# Patient Record
Sex: FEMALE | Race: BLACK OR AFRICAN AMERICAN | Employment: FULL TIME | URBAN - METROPOLITAN AREA
[De-identification: names, ages, dates, MRNs, and addresses within clinical notes are randomized per-mention and may not be internally consistent; named-entity substitution may affect disease eponyms.]

---

## 2017-04-26 PROBLEM — N94.6 SEVERE DYSMENORRHEA: Status: ACTIVE | Noted: 2017-04-26

## 2017-04-26 PROBLEM — N83.202 LEFT OVARIAN CYST: Status: ACTIVE | Noted: 2017-04-26

## 2017-04-26 PROBLEM — Z84.2 FAMILY HISTORY OF ENDOMETRIOSIS: Status: ACTIVE | Noted: 2017-04-26

## 2017-05-18 ENCOUNTER — HOSPITAL ENCOUNTER (OUTPATIENT)
Dept: SURGERY | Age: 27
Discharge: HOME | End: 2017-05-18

## 2017-05-18 PROBLEM — D25.1 INTRAMURAL LEIOMYOMA OF UTERUS: Status: ACTIVE | Noted: 2017-05-18

## 2017-05-19 VITALS — WEIGHT: 204 LBS | HEIGHT: 64 IN | BODY MASS INDEX: 34.83 KG/M2

## 2017-05-19 NOTE — PERIOP NOTES
Patient verified name, , and surgery as listed in [facility]. Type 2 surgery, phone assessment complete. Orders received. Labs per surgeon: CBC=coming to outpatient lab. Labs per anesthesia protocol: included in surgeons orders. EKG: not needed per [facility] protocols. Patient answered medical/surgical history questions at their best of ability. All prior to admission medications documented in [facility]. Patient instructed to take the following medications the day of surgery according to anesthesia guidelines with a small sip of water: none. Hold all vitamins 7 days prior to surgery and NSAIDS 5 days prior to surgery. Medications to be held: anaprox, vicoprofen. Patient instructed on the following and verbalizes understanding:  Arrive at [employer], time of arrival to be called the day before by 1700  NPO after midnight including gum, mints, and ice chips  Responsible adult must drive patient to the hospital, stay during surgery, and patient will need supervision 24 hours after anesthesia  Use dial and hibiclens in shower the night before surgery and on the morning of surgery  Leave all valuables(money and jewelry) at home but bring insurance card and ID on DOS  Do not wear make-up, nail polish, lotions, cologne, perfumes, powders, or oil on skin.

## 2017-05-23 ENCOUNTER — ANESTHESIA EVENT (OUTPATIENT)
Dept: SURGERY | Age: 27
End: 2017-05-23
Payer: COMMERCIAL

## 2017-05-23 ENCOUNTER — HOSPITAL ENCOUNTER (OUTPATIENT)
Dept: LAB | Age: 27
Discharge: HOME | End: 2017-05-23
Attending: OBSTETRICS & GYNECOLOGY

## 2017-05-23 LAB
ERYTHROCYTE [DISTWIDTH] IN BLOOD BY AUTOMATED COUNT: 15.7 % (ref 11.9–14.6)
HCT VFR BLD AUTO: 32.3 % (ref 35.8–46.3)
HGB BLD-MCNC: 10.4 G/DL (ref 11.7–15.4)
MCH RBC QN AUTO: 25.5 PG (ref 26.1–32.9)
MCHC RBC AUTO-ENTMCNC: 32.2 G/DL (ref 31.4–35)
MCV RBC AUTO: 79.2 FL (ref 79.6–97.8)
PLATELET # BLD AUTO: 276 K/UL (ref 150–450)
PMV BLD AUTO: 10.4 FL (ref 10.8–14.1)
RBC # BLD AUTO: 4.08 M/UL (ref 4.05–5.25)
WBC # BLD AUTO: 5.3 K/UL (ref 4.3–11.1)

## 2017-05-23 NOTE — PERIOP NOTES
CBC results within anesthesia guidelines. Will fax to surgeon's office for review. Chart to preop.     Recent Results (from the past 12 hour(s))   CBC W/O DIFF    Collection Time: 05/23/17  3:03 PM   Result Value Ref Range    WBC 5.3 4.3 - 11.1 K/uL    RBC 4.08 4.05 - 5.25 M/uL    HGB 10.4 (L) 11.7 - 15.4 g/dL    HCT 32.3 (L) 35.8 - 46.3 %    MCV 79.2 (L) 79.6 - 97.8 FL    MCH 25.5 (L) 26.1 - 32.9 PG    MCHC 32.2 31.4 - 35.0 g/dL    RDW 15.7 (H) 11.9 - 14.6 %    PLATELET 836 230 - 176 K/uL    MPV 10.4 (L) 10.8 - 14.1 FL

## 2017-05-23 NOTE — PERIOP NOTES
Call to Morse Severe in lab to verify that CBC was received at 1500 when drawn and sent- no results listed in EMR at this time. Morse Severe states he will follow-up with CBC and call if there are any problems.

## 2017-05-24 ENCOUNTER — HOSPITAL ENCOUNTER (OUTPATIENT)
Age: 27
Setting detail: OUTPATIENT SURGERY
Discharge: HOME | End: 2017-05-24
Attending: OBSTETRICS & GYNECOLOGY | Admitting: OBSTETRICS & GYNECOLOGY
Payer: COMMERCIAL

## 2017-05-24 ENCOUNTER — ANESTHESIA (OUTPATIENT)
Dept: SURGERY | Age: 27
End: 2017-05-24
Payer: COMMERCIAL

## 2017-05-24 VITALS
HEIGHT: 64 IN | HEART RATE: 77 BPM | BODY MASS INDEX: 34.75 KG/M2 | OXYGEN SATURATION: 99 % | TEMPERATURE: 99.6 F | SYSTOLIC BLOOD PRESSURE: 155 MMHG | DIASTOLIC BLOOD PRESSURE: 98 MMHG | RESPIRATION RATE: 20 BRPM | WEIGHT: 203.56 LBS

## 2017-05-24 PROBLEM — N80.9 ENDOMETRIOSIS: Status: ACTIVE | Noted: 2017-05-24

## 2017-05-24 LAB — HCG UR QL: NEGATIVE

## 2017-05-24 PROCEDURE — 74011000250 HC RX REV CODE- 250

## 2017-05-24 PROCEDURE — 74011000254 HC RX REV CODE- 254: Performed by: OBSTETRICS & GYNECOLOGY

## 2017-05-24 PROCEDURE — 74011250636 HC RX REV CODE- 250/636: Performed by: ANESTHESIOLOGY

## 2017-05-24 PROCEDURE — 88304 TISSUE EXAM BY PATHOLOGIST: CPT | Performed by: OBSTETRICS & GYNECOLOGY

## 2017-05-24 PROCEDURE — 77030031139 HC SUT VCRL2 J&J -A: Performed by: OBSTETRICS & GYNECOLOGY

## 2017-05-24 PROCEDURE — 77030000038 HC TIP SCIS LAPSCP SURI -B: Performed by: OBSTETRICS & GYNECOLOGY

## 2017-05-24 PROCEDURE — 77030011810 HC STPLR ENDOSC J&J -G: Performed by: OBSTETRICS & GYNECOLOGY

## 2017-05-24 PROCEDURE — 76060000035 HC ANESTHESIA 2 TO 2.5 HR: Performed by: OBSTETRICS & GYNECOLOGY

## 2017-05-24 PROCEDURE — 77030019927 HC TBNG IRR CYSTO BAXT -A: Performed by: OBSTETRICS & GYNECOLOGY

## 2017-05-24 PROCEDURE — 77030034849: Performed by: OBSTETRICS & GYNECOLOGY

## 2017-05-24 PROCEDURE — 77030035045 HC TRCR ENDOSC VRSPRT BLDLSS COVD -B: Performed by: OBSTETRICS & GYNECOLOGY

## 2017-05-24 PROCEDURE — 77030035035 HC TRCR ENDOSC VRSPRT V2 COVD -B: Performed by: OBSTETRICS & GYNECOLOGY

## 2017-05-24 PROCEDURE — 76210000016 HC OR PH I REC 1 TO 1.5 HR: Performed by: OBSTETRICS & GYNECOLOGY

## 2017-05-24 PROCEDURE — 77030008756 HC TU IRR SUC STRY -B: Performed by: OBSTETRICS & GYNECOLOGY

## 2017-05-24 PROCEDURE — 81025 URINE PREGNANCY TEST: CPT

## 2017-05-24 PROCEDURE — 74011000250 HC RX REV CODE- 250: Performed by: ANESTHESIOLOGY

## 2017-05-24 PROCEDURE — 77030008703 HC TU ET UNCUF COVD -A: Performed by: ANESTHESIOLOGY

## 2017-05-24 PROCEDURE — 77030011502 HC MANIP UTER ZUM ZINN -B: Performed by: OBSTETRICS & GYNECOLOGY

## 2017-05-24 PROCEDURE — 74011250636 HC RX REV CODE- 250/636

## 2017-05-24 PROCEDURE — 77030032490 HC SLV COMPR SCD KNE COVD -B: Performed by: OBSTETRICS & GYNECOLOGY

## 2017-05-24 PROCEDURE — 77030008477 HC STYL SATN SLP COVD -A: Performed by: ANESTHESIOLOGY

## 2017-05-24 PROCEDURE — 77030018825 HC SOL ADH REDUC BAXT -C: Performed by: OBSTETRICS & GYNECOLOGY

## 2017-05-24 PROCEDURE — 77030035029 HC NDL INSUF VERES DISP COVD -B: Performed by: OBSTETRICS & GYNECOLOGY

## 2017-05-24 PROCEDURE — 77030010507 HC ADH SKN DERMBND J&J -B: Performed by: OBSTETRICS & GYNECOLOGY

## 2017-05-24 PROCEDURE — 77030035051: Performed by: OBSTETRICS & GYNECOLOGY

## 2017-05-24 PROCEDURE — 77030008518 HC TBNG INSUF ENDO STRY -B: Performed by: OBSTETRICS & GYNECOLOGY

## 2017-05-24 PROCEDURE — 77030018836 HC SOL IRR NACL ICUM -A: Performed by: OBSTETRICS & GYNECOLOGY

## 2017-05-24 PROCEDURE — 77030011640 HC PAD GRND REM COVD -A: Performed by: OBSTETRICS & GYNECOLOGY

## 2017-05-24 PROCEDURE — 77030020782 HC GWN BAIR PAWS FLX 3M -B: Performed by: ANESTHESIOLOGY

## 2017-05-24 PROCEDURE — 76010000171 HC OR TIME 2 TO 2.5 HR INTENSV-TIER 1: Performed by: OBSTETRICS & GYNECOLOGY

## 2017-05-24 PROCEDURE — 77030035048 HC TRCR ENDOSC OPTCL COVD -B: Performed by: OBSTETRICS & GYNECOLOGY

## 2017-05-24 PROCEDURE — 88305 TISSUE EXAM BY PATHOLOGIST: CPT | Performed by: OBSTETRICS & GYNECOLOGY

## 2017-05-24 PROCEDURE — 76210000020 HC REC RM PH II FIRST 0.5 HR: Performed by: OBSTETRICS & GYNECOLOGY

## 2017-05-24 RX ORDER — SODIUM CHLORIDE, SODIUM LACTATE, POTASSIUM CHLORIDE, CALCIUM CHLORIDE 600; 310; 30; 20 MG/100ML; MG/100ML; MG/100ML; MG/100ML
100 INJECTION, SOLUTION INTRAVENOUS CONTINUOUS
Status: DISCONTINUED | OUTPATIENT
Start: 2017-05-24 | End: 2017-05-24 | Stop reason: HOSPADM

## 2017-05-24 RX ORDER — NALOXONE HYDROCHLORIDE 0.4 MG/ML
0.1 INJECTION, SOLUTION INTRAMUSCULAR; INTRAVENOUS; SUBCUTANEOUS AS NEEDED
Status: DISCONTINUED | OUTPATIENT
Start: 2017-05-24 | End: 2017-05-24 | Stop reason: HOSPADM

## 2017-05-24 RX ORDER — NEOSTIGMINE METHYLSULFATE 1 MG/ML
INJECTION INTRAVENOUS AS NEEDED
Status: DISCONTINUED | OUTPATIENT
Start: 2017-05-24 | End: 2017-05-24 | Stop reason: HOSPADM

## 2017-05-24 RX ORDER — SODIUM CHLORIDE 0.9 % (FLUSH) 0.9 %
5-10 SYRINGE (ML) INJECTION EVERY 8 HOURS
Status: DISCONTINUED | OUTPATIENT
Start: 2017-05-24 | End: 2017-05-24 | Stop reason: HOSPADM

## 2017-05-24 RX ORDER — FENTANYL CITRATE 50 UG/ML
100 INJECTION, SOLUTION INTRAMUSCULAR; INTRAVENOUS ONCE
Status: DISCONTINUED | OUTPATIENT
Start: 2017-05-24 | End: 2017-05-24 | Stop reason: HOSPADM

## 2017-05-24 RX ORDER — INDOCYANINE GREEN AND WATER 25 MG
KIT INJECTION AS NEEDED
Status: DISCONTINUED | OUTPATIENT
Start: 2017-05-24 | End: 2017-05-24 | Stop reason: HOSPADM

## 2017-05-24 RX ORDER — MIDAZOLAM HYDROCHLORIDE 1 MG/ML
2 INJECTION, SOLUTION INTRAMUSCULAR; INTRAVENOUS
Status: DISCONTINUED | OUTPATIENT
Start: 2017-05-24 | End: 2017-05-24 | Stop reason: HOSPADM

## 2017-05-24 RX ORDER — LIDOCAINE HYDROCHLORIDE 20 MG/ML
INJECTION, SOLUTION EPIDURAL; INFILTRATION; INTRACAUDAL; PERINEURAL AS NEEDED
Status: DISCONTINUED | OUTPATIENT
Start: 2017-05-24 | End: 2017-05-24 | Stop reason: HOSPADM

## 2017-05-24 RX ORDER — HYDROMORPHONE HYDROCHLORIDE 2 MG/ML
0.5 INJECTION, SOLUTION INTRAMUSCULAR; INTRAVENOUS; SUBCUTANEOUS
Status: DISCONTINUED | OUTPATIENT
Start: 2017-05-24 | End: 2017-05-24 | Stop reason: HOSPADM

## 2017-05-24 RX ORDER — PROPOFOL 10 MG/ML
INJECTION, EMULSION INTRAVENOUS AS NEEDED
Status: DISCONTINUED | OUTPATIENT
Start: 2017-05-24 | End: 2017-05-24 | Stop reason: HOSPADM

## 2017-05-24 RX ORDER — OXYCODONE HYDROCHLORIDE 5 MG/1
10 TABLET ORAL
Status: DISCONTINUED | OUTPATIENT
Start: 2017-05-24 | End: 2017-05-24 | Stop reason: HOSPADM

## 2017-05-24 RX ORDER — OXYCODONE HYDROCHLORIDE 5 MG/1
5 TABLET ORAL
Status: DISCONTINUED | OUTPATIENT
Start: 2017-05-24 | End: 2017-05-24 | Stop reason: HOSPADM

## 2017-05-24 RX ORDER — LIDOCAINE HYDROCHLORIDE 10 MG/ML
0.1 INJECTION INFILTRATION; PERINEURAL AS NEEDED
Status: DISCONTINUED | OUTPATIENT
Start: 2017-05-24 | End: 2017-05-24 | Stop reason: HOSPADM

## 2017-05-24 RX ORDER — GLYCOPYRROLATE 0.2 MG/ML
INJECTION INTRAMUSCULAR; INTRAVENOUS AS NEEDED
Status: DISCONTINUED | OUTPATIENT
Start: 2017-05-24 | End: 2017-05-24 | Stop reason: HOSPADM

## 2017-05-24 RX ORDER — MIDAZOLAM HYDROCHLORIDE 1 MG/ML
2 INJECTION, SOLUTION INTRAMUSCULAR; INTRAVENOUS ONCE
Status: COMPLETED | OUTPATIENT
Start: 2017-05-24 | End: 2017-05-24

## 2017-05-24 RX ORDER — ROCURONIUM BROMIDE 10 MG/ML
INJECTION, SOLUTION INTRAVENOUS AS NEEDED
Status: DISCONTINUED | OUTPATIENT
Start: 2017-05-24 | End: 2017-05-24 | Stop reason: HOSPADM

## 2017-05-24 RX ORDER — ONDANSETRON 2 MG/ML
4 INJECTION INTRAMUSCULAR; INTRAVENOUS ONCE
Status: DISCONTINUED | OUTPATIENT
Start: 2017-05-24 | End: 2017-05-24 | Stop reason: HOSPADM

## 2017-05-24 RX ORDER — CEFAZOLIN SODIUM 1 G/3ML
INJECTION, POWDER, FOR SOLUTION INTRAMUSCULAR; INTRAVENOUS AS NEEDED
Status: DISCONTINUED | OUTPATIENT
Start: 2017-05-24 | End: 2017-05-24 | Stop reason: HOSPADM

## 2017-05-24 RX ORDER — DEXAMETHASONE SODIUM PHOSPHATE 4 MG/ML
INJECTION, SOLUTION INTRA-ARTICULAR; INTRALESIONAL; INTRAMUSCULAR; INTRAVENOUS; SOFT TISSUE AS NEEDED
Status: DISCONTINUED | OUTPATIENT
Start: 2017-05-24 | End: 2017-05-24 | Stop reason: HOSPADM

## 2017-05-24 RX ORDER — FENTANYL CITRATE 50 UG/ML
INJECTION, SOLUTION INTRAMUSCULAR; INTRAVENOUS AS NEEDED
Status: DISCONTINUED | OUTPATIENT
Start: 2017-05-24 | End: 2017-05-24 | Stop reason: HOSPADM

## 2017-05-24 RX ORDER — HYDROCODONE BITARTRATE AND IBUPROFEN 7.5; 2 MG/1; MG/1
1 TABLET, FILM COATED ORAL
Qty: 30 TAB | Refills: 0 | Status: SHIPPED | OUTPATIENT
Start: 2017-05-24 | End: 2017-07-11 | Stop reason: ALTCHOICE

## 2017-05-24 RX ORDER — ONDANSETRON 2 MG/ML
INJECTION INTRAMUSCULAR; INTRAVENOUS AS NEEDED
Status: DISCONTINUED | OUTPATIENT
Start: 2017-05-24 | End: 2017-05-24 | Stop reason: HOSPADM

## 2017-05-24 RX ORDER — ALBUTEROL SULFATE 0.83 MG/ML
2.5 SOLUTION RESPIRATORY (INHALATION) AS NEEDED
Status: DISCONTINUED | OUTPATIENT
Start: 2017-05-24 | End: 2017-05-24 | Stop reason: HOSPADM

## 2017-05-24 RX ORDER — DIPHENHYDRAMINE HYDROCHLORIDE 50 MG/ML
12.5 INJECTION, SOLUTION INTRAMUSCULAR; INTRAVENOUS
Status: DISCONTINUED | OUTPATIENT
Start: 2017-05-24 | End: 2017-05-24 | Stop reason: HOSPADM

## 2017-05-24 RX ORDER — DOXYCYCLINE 100 MG/1
100 CAPSULE ORAL 2 TIMES DAILY
Qty: 20 CAP | Refills: 0 | Status: SHIPPED | OUTPATIENT
Start: 2017-05-24 | End: 2017-07-11 | Stop reason: ALTCHOICE

## 2017-05-24 RX ORDER — KETOROLAC TROMETHAMINE 30 MG/ML
INJECTION, SOLUTION INTRAMUSCULAR; INTRAVENOUS AS NEEDED
Status: DISCONTINUED | OUTPATIENT
Start: 2017-05-24 | End: 2017-05-24 | Stop reason: HOSPADM

## 2017-05-24 RX ORDER — SODIUM CHLORIDE 0.9 % (FLUSH) 0.9 %
5-10 SYRINGE (ML) INJECTION AS NEEDED
Status: DISCONTINUED | OUTPATIENT
Start: 2017-05-24 | End: 2017-05-24 | Stop reason: HOSPADM

## 2017-05-24 RX ADMIN — NEOSTIGMINE METHYLSULFATE 3 MG: 1 INJECTION INTRAVENOUS at 12:19

## 2017-05-24 RX ADMIN — FENTANYL CITRATE 50 MCG: 50 INJECTION, SOLUTION INTRAMUSCULAR; INTRAVENOUS at 11:37

## 2017-05-24 RX ADMIN — SODIUM CHLORIDE, SODIUM LACTATE, POTASSIUM CHLORIDE, AND CALCIUM CHLORIDE: 600; 310; 30; 20 INJECTION, SOLUTION INTRAVENOUS at 11:04

## 2017-05-24 RX ADMIN — SODIUM CHLORIDE, SODIUM LACTATE, POTASSIUM CHLORIDE, AND CALCIUM CHLORIDE 100 ML/HR: 600; 310; 30; 20 INJECTION, SOLUTION INTRAVENOUS at 09:33

## 2017-05-24 RX ADMIN — PROPOFOL 150 MG: 10 INJECTION, EMULSION INTRAVENOUS at 10:20

## 2017-05-24 RX ADMIN — ROCURONIUM BROMIDE 50 MG: 10 INJECTION, SOLUTION INTRAVENOUS at 10:20

## 2017-05-24 RX ADMIN — LIDOCAINE HYDROCHLORIDE 0.1 ML: 10 INJECTION, SOLUTION INFILTRATION; PERINEURAL at 09:33

## 2017-05-24 RX ADMIN — KETOROLAC TROMETHAMINE 30 MG: 30 INJECTION, SOLUTION INTRAMUSCULAR; INTRAVENOUS at 12:15

## 2017-05-24 RX ADMIN — ONDANSETRON 4 MG: 2 INJECTION INTRAMUSCULAR; INTRAVENOUS at 12:00

## 2017-05-24 RX ADMIN — FENTANYL CITRATE 100 MCG: 50 INJECTION, SOLUTION INTRAMUSCULAR; INTRAVENOUS at 10:20

## 2017-05-24 RX ADMIN — LIDOCAINE HYDROCHLORIDE 100 MG: 20 INJECTION, SOLUTION EPIDURAL; INFILTRATION; INTRACAUDAL; PERINEURAL at 10:20

## 2017-05-24 RX ADMIN — CEFAZOLIN SODIUM 1 G: 1 INJECTION, POWDER, FOR SOLUTION INTRAMUSCULAR; INTRAVENOUS at 10:38

## 2017-05-24 RX ADMIN — DEXAMETHASONE SODIUM PHOSPHATE 5 MG: 4 INJECTION, SOLUTION INTRA-ARTICULAR; INTRALESIONAL; INTRAMUSCULAR; INTRAVENOUS; SOFT TISSUE at 10:33

## 2017-05-24 RX ADMIN — ROCURONIUM BROMIDE 10 MG: 10 INJECTION, SOLUTION INTRAVENOUS at 11:14

## 2017-05-24 RX ADMIN — HYDROMORPHONE HYDROCHLORIDE 0.5 MG: 2 INJECTION, SOLUTION INTRAMUSCULAR; INTRAVENOUS; SUBCUTANEOUS at 12:42

## 2017-05-24 RX ADMIN — HYDROMORPHONE HYDROCHLORIDE 0.5 MG: 2 INJECTION, SOLUTION INTRAMUSCULAR; INTRAVENOUS; SUBCUTANEOUS at 12:56

## 2017-05-24 RX ADMIN — MIDAZOLAM HYDROCHLORIDE 2 MG: 1 INJECTION, SOLUTION INTRAMUSCULAR; INTRAVENOUS at 09:42

## 2017-05-24 RX ADMIN — GLYCOPYRROLATE 0.4 MG: 0.2 INJECTION INTRAMUSCULAR; INTRAVENOUS at 12:19

## 2017-05-24 RX ADMIN — FENTANYL CITRATE 50 MCG: 50 INJECTION, SOLUTION INTRAMUSCULAR; INTRAVENOUS at 10:43

## 2017-05-24 RX ADMIN — HYDROMORPHONE HYDROCHLORIDE 0.5 MG: 2 INJECTION, SOLUTION INTRAMUSCULAR; INTRAVENOUS; SUBCUTANEOUS at 13:15

## 2017-05-24 NOTE — ANESTHESIA PREPROCEDURE EVALUATION
Anesthetic History               Review of Systems / Medical History  Patient summary reviewed, nursing notes reviewed and pertinent labs reviewed    Pulmonary            Asthma : well controlled       Neuro/Psych              Cardiovascular    Hypertension: poorly controlled              Exercise tolerance: >4 METS     GI/Hepatic/Renal                Endo/Other             Other Findings              Physical Exam    Airway  Mallampati: I  TM Distance: 4 - 6 cm  Neck ROM: normal range of motion   Mouth opening: Normal     Cardiovascular  Regular rate and rhythm,  S1 and S2 normal,  no murmur, click, rub, or gallop             Dental  No notable dental hx       Pulmonary  Breath sounds clear to auscultation               Abdominal         Other Findings            Anesthetic Plan    ASA: 2  Anesthesia type: general          Induction: Intravenous  Anesthetic plan and risks discussed with: Patient

## 2017-05-24 NOTE — BRIEF OP NOTE
BRIEF OPERATIVE NOTE    Date of Procedure: 5/24/2017   Preoperative Diagnosis: Pelvic pain in female [R10.2]  Postoperative Diagnosis: Pelvic pain in female [R10.2]    Procedure(s):  LAPAROSCOPY GYN WITH CO2 LASER OF ENDOMETRIOSIS, LYSIS OF ADHESIONS, OVARIAN CYCSTECTOMY  and CHROMOTUBATION  Surgeon(s) and Role:     * Brunilda Troy MD - Primary         Assistant Staff:       Surgical Staff:  Circ-1: Rojelio Fonseca RN  Scrub Tech-1: Anselmo Golden  Scrub Tech-2: Mendez Cadet  Scrub Tech-3: Gomez Weems  Scrub Private/Assistant: Debora Current  Event Time In   Incision Start 1041   Incision Close 1220     Anesthesia: General   Estimated Blood Loss: 10cc  Specimens:   ID Type Source Tests Collected by Time Destination   1 : Peritoneal Biopsy Preservative   Brunilda Troy MD 5/24/2017 1100 Pathology   2 : Left Ovarian Cyst Wall Preservative   Brunilda Troy MD 5/24/2017 1110 Pathology      Findings: stage 4 endometriosis   Complications: 0  Implants: * No implants in log *

## 2017-05-24 NOTE — IP AVS SNAPSHOT
Michael Moody 
 
 
 [address] 
[phone] Patient: Misa Willis MRN: [id number] MARY LOU:[birth year] You are allergic to the following No active allergies Recent Documentation Height Weight BMI OB Status Smoking Status 1.626 m 92.3 kg 34.94 kg/m2 Having regular periods Never Smoker Emergency Contacts Name Discharge Info Relation Home Work Mobile [address]  Parent [1] [phone] About your hospitalization You were admitted on:  May 24, 2017 You last received care in the:  [facility] PACU You were discharged on:  May 24, 2017 Unit phone number:  [phone] Why you were hospitalized Your primary diagnosis was:  Endometriosis Providers Seen During Your Hospitalizations Provider Role Specialty Primary office phone Norma Ferguson MD Attending Provider Obstetrics & Gynecology [phone] Your Primary Care Physician (PCP) Primary Care Physician Office Phone Office Fax NONE ** None ** ** None ** Follow-up Information Follow up With Details Comments Contact Info None   None ([phone]) Patient stated that they have no PCP Norma Ferguson MD Follow up in 1 week(s)  [address] [facility] [facility] [address] [phone] Your Appointments Wednesday June 07, 2017  2:10 PM EDT Global Post Op with Norma Ferguson MD  
[facility] ([facility]) [address] [place] [id number] [phone] Current Discharge Medication List  
  
START taking these medications Dose & Instructions Dispensing Information Comments Morning Noon Evening Bedtime  
 doxycycline 100 mg capsule Commonly known as:  Chava Buenrostro Your last dose was: Your next dose is:    
   
   
 Dose:  100 mg Take 1 Cap by mouth two (2) times a day. Quantity:  20 Cap Refills:  0 HYDROcodone-ibuprofen 7.5-200 mg per tablet Commonly known as:  Casie Nick Replaces:  HYDROcodone-ibuprofen 5-200 mg per tablet Your last dose was: Your next dose is:    
   
   
 Dose:  1 Tab Take 1 Tab by mouth every four (4) hours as needed for Pain. Max Daily Amount: 6 Tabs. Quantity:  30 Tab Refills:  0 STOP taking these medications HYDROcodone-ibuprofen 5-200 mg per tablet Commonly known as:  Casie Nick Replaced by:  HYDROcodone-ibuprofen 7.5-200 mg per tablet ASK your doctor about these medications Dose & Instructions Dispensing Information Comments Morning Noon Evening Bedtime  
 naproxen sodium 550 mg tablet Commonly known as:  Erinn Díaz Your last dose was: Your next dose is:    
   
   
 Dose:  550 mg Take 1 Tab by mouth two (2) times daily (with meals). Quantity:  60 Tab Refills:  2 Where to Get Your Medications Information on where to get these meds will be given to you by the nurse or doctor. ! Ask your nurse or doctor about these medications  
  doxycycline 100 mg capsule HYDROcodone-ibuprofen 7.5-200 mg per tablet Discharge Instructions INSTRUCTIONS FOLLOWING GYN LAPAROSCOPY 
 
 
ACTIVITY  Limit activity today; increase activity tomorrow, but no vigorous exercise  Shower only; no tub baths  No douches, tampons or intercourse until your doctor releases you (at least 2 weeks)  May return to work or school as directed by your doctor DIET  Clear liquids until no nausea or vomiting  Advance to regular diet as tolerated PAIN 
 Expect a moderate amount of pain.  Take pain medication as directed by your doctor. If no prescription for pain is sent home with you, take the appropriate dose of your commonly used pain medication.  Call you doctor if pain is NOT relieved by medication.  DO NOT take aspirin or blood thinners until directed by your doctor. DRESSING CARE  Change dressing / band aids as directed by your doctor. FOLLOW PHONE CALLS * Calls will be made by nursing staff.  If you have any problems or concerns, call your doctor as needed. CALL YOUR DOCTOR IF 
 Excessive bleeding that does not stop after holding mild pressure over the area for 15 minutes  You soak a pad in an hour  Temperature of 101°F or above  Green or yellow, smelly drainage or discharge  You are unable to urinate by bedtime  Nausea and vomiting that does not stop by bedtime AFTER ANESTHESIA  For the next 24 hours: DO NOT Drive, Drink alcoholic beverages, or Make important decisions.  Be aware of dizziness following anesthesia and while taking pain medication.  Plan to stay tonight within 1 hours drive of the hospital. 
 
 
 
 
Discharge Orders None Introducing [facility]! Dear Pamella Daly: 
Thank you for requesting a NXTM account. Our records indicate that you already have an active NXTM account. You can access your account anytime at [email or web address] Did you know that you can access your hospital and ER discharge instructions at any time in NXTM? You can also review all of your test results from your hospital stay or ER visit. Additional Information If you have questions, please visit the Frequently Asked Questions section of the NXTM website at [email or web address] Remember, NXTM is NOT to be used for urgent needs. For medical emergencies, dial 911. Now available from your iPhone and Android! General Information Please provide this summary of care documentation to your next provider. Patient Signature:  ____________________________________________________________  Date:  ____________________________________________________________  
  
Mike Chavez    
    
 Provider Signature:  ____________________________________________________________ Date:  ____________________________________________________________

## 2017-05-24 NOTE — ANESTHESIA POSTPROCEDURE EVALUATION
Post-Anesthesia Evaluation and Assessment    Patient: Pricilla Parents MRN: [id number]  SSN: [id number]    YOB: 1990  Age: 32 y.o. Sex: female       Cardiovascular Function/Vital Signs  Visit Vitals    BP (!) 145/91    Pulse 77    Temp 37.6 °C (99.6 °F)    Resp 20    Ht 5' 4\" (1.626 m)    Wt 92.3 kg (203 lb 9 oz)    SpO2 100%    BMI 34.94 kg/m2       Patient is status post general anesthesia for Procedure(s):  LAPAROSCOPY GYN WITH CO2 LASER OF ENDOMETRIOSIS, LYSIS OF ADHESIONS, OVARIAN CYCSTECTOMY  and CHROMOTUBATION. Nausea/Vomiting: None    Postoperative hydration reviewed and adequate. Pain:  Pain Scale 1: FLACC (05/24/17 1330)  Pain Intensity 1: 0 (05/24/17 1330)   Managed    Neurological Status:   Neuro (WDL): Within Defined Limits (05/24/17 1318)  Neuro  Neurologic State: Sleeping (05/24/17 1330)  Orientation Level: Oriented to person;Oriented to place (05/24/17 1318)  LUE Motor Response: Purposeful (05/24/17 1318)  LLE Motor Response: Purposeful (05/24/17 1318)  RUE Motor Response: Purposeful (05/24/17 1318)  RLE Motor Response: Purposeful (05/24/17 1318)   At baseline    Mental Status and Level of Consciousness: Arousable    Pulmonary Status:   O2 Device: Nasal cannula (05/24/17 1242)   Adequate oxygenation and airway patent    Complications related to anesthesia: None    Post-anesthesia assessment completed.  No concerns    Signed By: Ovidio Lee MD     May 24, 2017

## 2017-05-24 NOTE — DISCHARGE INSTRUCTIONS
INSTRUCTIONS FOLLOWING GYN LAPAROSCOPY      ACTIVITY   Limit activity today; increase activity tomorrow, but no vigorous exercise   Shower only; no tub baths   No douches, tampons or intercourse until your doctor releases you (at least 2 weeks)   May return to work or school as directed by your doctor    DIET   Clear liquids until no nausea or vomiting   Advance to regular diet as tolerated    PAIN   Expect a moderate amount of pain.  Take pain medication as directed by your doctor. If no prescription for pain is sent home with you, take the appropriate dose of your commonly used pain medication.  Call you doctor if pain is NOT relieved by medication.  DO NOT take aspirin or blood thinners until directed by your doctor. DRESSING CARE    Change dressing / band aids as directed by your doctor. FOLLOW PHONE [address] will be made by nursing staff.  If you have any problems or concerns, call your doctor as needed. CALL YOUR DOCTOR IF   Excessive bleeding that does not stop after holding mild pressure over the area for 15 minutes   You soak a pad in an hour   Temperature of 101°F or above   Green or yellow, smelly drainage or discharge   You are unable to urinate by bedtime   Nausea and vomiting that does not stop by bedtime    AFTER ANESTHESIA   For the next 24 hours: DO NOT Drive, Drink alcoholic beverages, or Make important decisions.  Be aware of dizziness following anesthesia and while taking pain medication.    Plan to stay tonight within 1 hours drive of the hospital.

## 2017-05-30 NOTE — OP NOTES
[address]   OPERATIVE REPORT       Name:  Louise Lima   MR#:  [id number]   :  1990   Account #:  [de-identified]   Date of Adm:  2017       DATE OF SURGERY: 2017 at [facility]. PREOPERATIVE DIAGNOSES   1. Pelvic pain. 2. Severe dysmenorrhea. 3. Family history of endometriosis. POSTOPERATIVE DIAGNOSES   1. Stage IV endometriosis with involvement of the bladder flap   and adhesions to the anterior lower fundus. 2. Adhesions of the colon to the posterior lower uterine segment. 3. Bilateral adhesions of the ovaries to the pelvic side walls. 4. Endometrioma of the left ovary. PROCEDURE   1. CO2 laser laparoscopy. 2. Lysis of severe pelvic adhesions. 3. Ablation of implants of endometriosis. 4. Left ovarian cystectomy. 5. Chromotubation. ADDITIONAL FINDINGS: Both tubes filled bilaterally. The fimbria   appeared normal, but there was slow passage of the dye through   the tubes. SURGEON: Deb Roa MD     ASSISTANT: Enrrique Ely    COMPLICATIONS: None. DETAILS OF PROCEDURE: After placement on the operating room   table in supine position, patient under general endotracheal   anesthesia, repositioned in dorsal lateral position, prepped and   draped in the usual fashion for laparoscopic/vaginal surgery. A   Awan cannula was inserted in the cervix. A subumbilical   incision was made. Veress needle was placed through the   incision. The abdomen was filled 2.5 L of CO2. The 11 trocar   sleeve was then placed through the incision followed by   introduction of the operative laparoscope. The laser was   attached to the scope, as well as the camera. A suprapubic 5 mm trocar was placed. Visualization of the pelvis   revealed a very fixed, unmovable uterus. Blunt, sharp, and laser   dissection were used to attempt to restore a normal anatomy. The   left ovarian endometrioma ruptured spontaneously.  The ovaries   were  from the pelvic side wall and then the cyst wall   was removed. Implants of endometriosis over the bladder flap   were vaporized with restoration of normal anatomy in this   region. Right ovarian adhesions to the pelvic sidewall were   lysed both sharply and bluntly. Dye was then placed through the   Awan cannula. The tubes were filled with a slow dripping to the   ends. The fimbriated ends of the tubes were explored and a   laparoscopic grasper was used to dilate into the tubes. The dye   flowed fairly slowly, probably secondary to proximal spasm. The   fimbria appeared normal.    The pelvis was thoroughly irrigated, clean, and hemostasis was   ensured and a liter of Adept was placed in the pelvis. PLAN: The patient was discharged home with plans to verify   insurance coverage for Depo Lupron and she will have followup by   [facility].         Alicia Garber MD      REL / TB   D:  05/30/2017   10:08   T:  05/30/2017   10:40   Job #:  [id number]

## 2017-07-11 PROBLEM — I10 CHRONIC HYPERTENSION: Status: ACTIVE | Noted: 2017-07-11

## 2017-07-11 PROBLEM — G43.009 MIGRAINE WITHOUT AURA, NOT INTRACTABLE: Status: ACTIVE | Noted: 2017-07-11

## 2017-07-14 PROBLEM — O26.899 RH NEGATIVE STATE IN ANTEPARTUM PERIOD: Status: ACTIVE | Noted: 2017-07-14

## 2017-07-14 PROBLEM — Z67.91 RH NEGATIVE STATE IN ANTEPARTUM PERIOD: Status: ACTIVE | Noted: 2017-07-14

## 2017-07-14 PROBLEM — D64.9 ANEMIA: Status: ACTIVE | Noted: 2017-07-14

## 2017-08-15 PROBLEM — O09.90 SUPERVISION OF HIGH RISK PREGNANCY, ANTEPARTUM: Status: ACTIVE | Noted: 2017-08-15

## 2017-09-01 ENCOUNTER — HOSPITAL ENCOUNTER (EMERGENCY)
Age: 27
Discharge: HOME | End: 2017-09-01
Attending: EMERGENCY MEDICINE
Payer: COMMERCIAL

## 2017-09-01 ENCOUNTER — APPOINTMENT (OUTPATIENT)
Dept: ULTRASOUND IMAGING | Age: 27
End: 2017-09-01
Payer: COMMERCIAL

## 2017-09-01 VITALS
BODY MASS INDEX: 33.97 KG/M2 | HEART RATE: 87 BPM | DIASTOLIC BLOOD PRESSURE: 70 MMHG | WEIGHT: 199 LBS | SYSTOLIC BLOOD PRESSURE: 133 MMHG | OXYGEN SATURATION: 99 % | HEIGHT: 64 IN | TEMPERATURE: 98.6 F | RESPIRATION RATE: 16 BRPM

## 2017-09-01 DIAGNOSIS — Z3A.15 15 WEEKS GESTATION OF PREGNANCY: Primary | ICD-10-CM

## 2017-09-01 DIAGNOSIS — O44.02 PLACENTA PREVIA, SECOND TRIMESTER: ICD-10-CM

## 2017-09-01 LAB
HCG SERPL-ACNC: ABNORMAL MIU/ML (ref 0–6)
HCG UR QL: POSITIVE

## 2017-09-01 PROCEDURE — 84702 CHORIONIC GONADOTROPIN TEST: CPT | Performed by: PHYSICIAN ASSISTANT

## 2017-09-01 PROCEDURE — 96372 THER/PROPH/DIAG INJ SC/IM: CPT | Performed by: PHYSICIAN ASSISTANT

## 2017-09-01 PROCEDURE — 76815 OB US LIMITED FETUS(S): CPT

## 2017-09-01 PROCEDURE — 81025 URINE PREGNANCY TEST: CPT

## 2017-09-01 PROCEDURE — 99284 EMERGENCY DEPT VISIT MOD MDM: CPT | Performed by: PHYSICIAN ASSISTANT

## 2017-09-01 PROCEDURE — 81003 URINALYSIS AUTO W/O SCOPE: CPT | Performed by: PHYSICIAN ASSISTANT

## 2017-09-01 PROCEDURE — 74011250636 HC RX REV CODE- 250/636: Performed by: PHYSICIAN ASSISTANT

## 2017-09-01 RX ADMIN — RHO(D) IMMUNE GLOBULIN (HUMAN) 0.3 MG: 1500 SOLUTION INTRAMUSCULAR at 16:51

## 2017-09-01 NOTE — ED TRIAGE NOTES
Pt to er c/o having bleeding . . sts she is 14 wks pregnant. .. sts she uses 2 panty liners a day for the bleeding

## 2017-09-01 NOTE — DISCHARGE INSTRUCTIONS
Placenta Previa: Care Instructions  Your Care Instructions    The placenta forms during pregnancy. It gives the baby nutrients and oxygen. It also removes waste products. Normally, the placenta attaches to the inner wall of the uterus, away from the opening of the uterus. Sometimes the placenta attaches so low that it blocks part of the opening. This is called placenta previa. Bed rest is usually recommended for mothers with this problem who have vaginal bleeding. Your doctor will watch you closely until your baby can be safely delivered. This can be a scary time. Most of the time a  delivery is done. Follow-up care is a key part of your treatment and safety. Be sure to make and go to all appointments, and call your doctor if you are having problems. It's also a good idea to know your test results and keep a list of the medicines you take. How can you care for yourself at home? · Do not do any heavy activity. Do not run or lift anything that weighs more than 20 pounds. · Have a phone nearby at all times. If you start to bleed, you will need to call your doctor right away. · Tell all doctors and nurses who examine you that you must not have pelvic exams because you have placenta previa. · Ask your doctor if you can have sex. Many doctors recommend that women with placenta previa not have intercourse after 28 weeks of pregnancy. · Do not put anything, such as tampons or douches, into your vagina. Use pads if you are bleeding, and call your doctor. When should you call for help? Call 911 anytime you think you may need emergency care. For example, call if:  · You passed out (lost consciousness). · You have severe vaginal bleeding. Call your doctor now or seek immediate medical care if:  · You have any vaginal bleeding. · You have new belly pain. · You think that you are in labor. · You have a sudden release of fluid from your vagina.   Watch closely for changes in your health, and be sure to contact your doctor if you have any questions or concerns. Where can you learn more? Go to http://matthew-franchesca.info/. Enter 82 [id number] in the search box to learn more about \"Placenta Previa: Care Instructions. \"  Current as of: May 30, 2016  Content Version: 11.3  © 8341-2958 [employer]. Care instructions adapted under license by [employer] (which disclaims liability or warranty for this information). If you have questions about a medical condition or this instruction, always ask your healthcare professional. [address] any warranty or liability for your use of this information.

## 2017-09-01 NOTE — ED NOTES
I have reviewed discharge instructions with the patient. The patient verbalized understanding. Printed instructions and follow-up information were given. Pt left ambulatory in no acute distress.

## 2017-09-01 NOTE — ED PROVIDER NOTES
HPI Comments: Pt + pregnancy, spotting x 2 days, using panty liner only, has sex prior to spotting, rh -, called ob office told to come to er G1     Patient is a 32 y.o. female presenting with pregnancy problem. The history is provided by the patient. Pregnancy Problem    This is a new problem. The current episode started yesterday. The problem occurs constantly. The problem has not changed since onset. The pain is associated with an unknown factor. The pain is located in the suprapubic region. The quality of the pain is cramping. The pain is at a severity of 5/10. The pain is mild. Pertinent negatives include no diarrhea, no nausea, no vomiting, no constipation, no dysuria and no frequency. Nothing worsens the pain. The pain is relieved by nothing. Past Medical History:   Diagnosis Date    Abnormal Papanicolaou smear of cervix     Anemia     Asthma     as a child    Frequent sinus infections     Hypertension     hx of; controlled with diet; no meds    LGSIL Pap smear of vagina 2009    Migraine     Morbid obesity (HCC)     BMI-35    Rh negative state in antepartum period        Past Surgical History:   Procedure Laterality Date    HX COLPOSCOPY  2008    HX GYN  05/24/2017    laparoscopy with CO2 laser of endometriosis, lysis of adhesions, ovarian cystectomy and chromotubastion     HX WISDOM TEETH EXTRACTION           Family History:   Problem Relation Age of Onset    Depression Other     Diabetes Maternal Grandmother     Hypertension Maternal Grandmother     Diabetes Paternal Grandmother     Hypertension Paternal Grandmother     No Known Problems Mother     No Known Problems Father     Breast Cancer Neg Hx     Ovarian Cancer Neg Hx     Colon Cancer Neg Hx        Social History     Social History    Marital status: SINGLE     Spouse name: N/A    Number of children: N/A    Years of education: N/A     Occupational History    Not on file.      Social History Main Topics    Smoking status: Never Smoker    Smokeless tobacco: Never Used    Alcohol use No    Drug use: No    Sexual activity: Yes     Partners: Male     Birth control/ protection: None     Other Topics Concern    Not on file     Social History Narrative         ALLERGIES: Review of patient's allergies indicates no known allergies. Review of Systems   Gastrointestinal: Negative for constipation, diarrhea, nausea and vomiting. Genitourinary: Negative for dysuria and frequency.        Vitals:    09/01/17 1228   BP: 133/70   Pulse: 100   Resp: 18   Temp: 98.6 °F (37 °C)   SpO2: 98%   Weight: 90.3 kg (199 lb)   Height: 5' 4\" (1.626 m)            Physical Exam     MDM  Number of Diagnoses or Management Options  Diagnosis management comments: Us with 15 w single iup  + previa   Pt given rhgam due to O-   Pt to follow up with ob gyn office, no sex until seen        Amount and/or Complexity of Data Reviewed  Clinical lab tests: ordered and reviewed  Tests in the radiology section of CPT®: ordered and reviewed  Review and summarize past medical records: yes  Discuss the patient with other providers: yes    Risk of Complications, Morbidity, and/or Mortality  Presenting problems: moderate  Diagnostic procedures: moderate  Management options: moderate    Patient Progress  Patient progress: improved    ED Course       Procedures

## 2017-10-09 PROBLEM — O28.3 ABNORMAL FETAL ULTRASOUND: Status: ACTIVE | Noted: 2017-10-09

## 2017-10-09 PROBLEM — O44.40 LOW-LYING PLACENTA: Status: ACTIVE | Noted: 2017-10-09

## 2017-10-11 PROBLEM — O35.08X0: Status: ACTIVE | Noted: 2017-10-11

## 2017-10-11 PROBLEM — O35.9XX0 SUSPECTED FETAL ANOMALY, ANTEPARTUM: Status: ACTIVE | Noted: 2017-10-09

## 2017-10-11 PROBLEM — N94.6 SEVERE DYSMENORRHEA: Status: RESOLVED | Noted: 2017-04-26 | Resolved: 2017-10-11

## 2017-10-11 PROBLEM — Z84.2 FAMILY HISTORY OF ENDOMETRIOSIS: Status: RESOLVED | Noted: 2017-04-26 | Resolved: 2017-10-11

## 2017-10-11 PROBLEM — O99.210 OBESITY IN PREGNANCY, ANTEPARTUM: Status: ACTIVE | Noted: 2017-07-11

## 2017-10-11 PROBLEM — D25.1 INTRAMURAL LEIOMYOMA OF UTERUS: Status: RESOLVED | Noted: 2017-05-18 | Resolved: 2017-10-11

## 2017-10-11 PROBLEM — N83.202 LEFT OVARIAN CYST: Status: RESOLVED | Noted: 2017-04-26 | Resolved: 2017-10-11

## 2017-10-11 PROBLEM — G43.009 MIGRAINE WITHOUT AURA, NOT INTRACTABLE: Status: RESOLVED | Noted: 2017-07-11 | Resolved: 2017-10-11

## 2017-10-11 PROBLEM — N80.9 ENDOMETRIOSIS: Status: RESOLVED | Noted: 2017-05-24 | Resolved: 2017-10-11

## 2017-10-11 PROBLEM — O10.012 HYPERTENSION IN PREGNANCY, ESSENTIAL, ANTEPARTUM, SECOND TRIMESTER: Status: ACTIVE | Noted: 2017-07-11

## 2017-10-11 PROBLEM — O99.012 ANEMIA COMPLICATING PREGNANCY IN SECOND TRIMESTER: Status: ACTIVE | Noted: 2017-07-14

## 2017-11-01 PROBLEM — O46.92 VAGINAL BLEEDING IN PREGNANCY, SECOND TRIMESTER: Status: ACTIVE | Noted: 2017-11-01

## 2017-11-06 PROBLEM — O36.5990 ASYMMETRIC IUGR AFFECTING PREGNANCY, ANTEPARTUM: Status: ACTIVE | Noted: 2017-11-06

## 2018-05-28 PROBLEM — O44.40 LOW-LYING PLACENTA: Status: RESOLVED | Noted: 2017-10-09 | Resolved: 2018-05-28

## 2018-05-28 PROBLEM — Z98.891 HISTORY OF CESAREAN DELIVERY: Status: ACTIVE | Noted: 2018-05-28

## 2018-05-28 PROBLEM — O09.299 HISTORY OF NEURAL TUBE DEFECT IN INFANT IN PRIOR PREGNANCY, CURRENTLY PREGNANT: Status: ACTIVE | Noted: 2018-05-28

## 2018-05-28 PROBLEM — O35.08X0: Status: RESOLVED | Noted: 2017-10-11 | Resolved: 2018-05-28

## 2018-05-28 PROBLEM — O35.9XX0 SUSPECTED FETAL ANOMALY, ANTEPARTUM: Status: RESOLVED | Noted: 2017-10-09 | Resolved: 2018-05-28

## 2018-05-28 PROBLEM — O36.5990 ASYMMETRIC IUGR AFFECTING PREGNANCY, ANTEPARTUM: Status: RESOLVED | Noted: 2017-11-06 | Resolved: 2018-05-28

## 2018-05-28 PROBLEM — O09.299 HISTORY OF FETAL ANOMALY IN PRIOR PREGNANCY, CURRENTLY PREGNANT: Status: ACTIVE | Noted: 2018-05-28

## 2018-05-28 PROBLEM — O99.012 ANEMIA COMPLICATING PREGNANCY IN SECOND TRIMESTER: Status: RESOLVED | Noted: 2017-07-14 | Resolved: 2018-05-28

## 2018-05-29 PROBLEM — O09.899 SHORT INTERVAL BETWEEN PREGNANCIES AFFECTING PREGNANCY, ANTEPARTUM: Status: ACTIVE | Noted: 2018-05-29

## 2018-05-29 PROBLEM — N80.9 ENDOMETRIOSIS: Status: ACTIVE | Noted: 2017-05-24

## 2018-06-06 ENCOUNTER — HOSPITAL ENCOUNTER (OUTPATIENT)
Dept: NON INVASIVE DIAGNOSTICS | Age: 28
Discharge: HOME | End: 2018-06-06
Attending: OBSTETRICS & GYNECOLOGY
Payer: COMMERCIAL

## 2018-06-06 DIAGNOSIS — O10.919 CHRONIC HYPERTENSION AFFECTING PREGNANCY: ICD-10-CM

## 2018-06-06 LAB
ATRIAL RATE: 92 BPM
CALCULATED P AXIS, ECG09: 59 DEGREES
CALCULATED R AXIS, ECG10: 84 DEGREES
CALCULATED T AXIS, ECG11: 40 DEGREES
DIAGNOSIS, 93000: NORMAL
P-R INTERVAL, ECG05: 112 MS
Q-T INTERVAL, ECG07: 342 MS
QRS DURATION, ECG06: 82 MS
QTC CALCULATION (BEZET), ECG08: 422 MS
VENTRICULAR RATE, ECG03: 92 BPM

## 2018-06-06 PROCEDURE — 93005 ELECTROCARDIOGRAM TRACING: CPT

## 2018-06-22 PROBLEM — O34.219 HISTORY OF CESAREAN SECTION COMPLICATING PREGNANCY: Status: ACTIVE | Noted: 2018-05-28

## 2018-06-22 PROBLEM — O26.891 RH NEGATIVE STATUS DURING PREGNANCY IN FIRST TRIMESTER: Status: ACTIVE | Noted: 2017-07-14

## 2018-06-22 PROBLEM — Z36.82 NUCHAL TRANSLUCENCY OF FETUS ON PRENATAL ULTRASOUND: Status: ACTIVE | Noted: 2018-06-22

## 2018-06-22 PROBLEM — O99.211 OBESITY AFFECTING PREGNANCY IN FIRST TRIMESTER: Status: ACTIVE | Noted: 2017-07-11

## 2018-06-22 PROBLEM — O09.91 SUPERVISION OF HIGH RISK PREGNANCY IN FIRST TRIMESTER: Status: ACTIVE | Noted: 2017-08-15

## 2018-07-20 PROBLEM — O09.92 SUPERVISION OF HIGH RISK PREGNANCY IN SECOND TRIMESTER: Status: ACTIVE | Noted: 2017-08-15

## 2018-07-20 PROBLEM — O99.212 OBESITY AFFECTING PREGNANCY IN SECOND TRIMESTER: Status: ACTIVE | Noted: 2017-07-11

## 2018-07-20 PROBLEM — O26.892 RH NEGATIVE STATUS DURING PREGNANCY IN SECOND TRIMESTER: Status: ACTIVE | Noted: 2017-07-14

## 2018-09-20 PROBLEM — N80.9 ENDOMETRIOSIS: Status: RESOLVED | Noted: 2017-05-24 | Resolved: 2018-09-20

## 2018-10-29 PROBLEM — O36.5990 POOR FETAL GROWTH, AFFECTING MANAGEMENT OF MOTHER, ANTEPARTUM CONDITION OR COMPLICATION: Status: ACTIVE | Noted: 2018-10-29

## 2018-11-20 ENCOUNTER — HOSPITAL ENCOUNTER (EMERGENCY)
Age: 28
Discharge: HOME | End: 2018-11-20
Attending: OBSTETRICS & GYNECOLOGY | Admitting: OBSTETRICS & GYNECOLOGY
Payer: COMMERCIAL

## 2018-11-20 VITALS
WEIGHT: 230 LBS | DIASTOLIC BLOOD PRESSURE: 69 MMHG | HEIGHT: 64 IN | BODY MASS INDEX: 39.27 KG/M2 | TEMPERATURE: 98 F | SYSTOLIC BLOOD PRESSURE: 118 MMHG | HEART RATE: 103 BPM | RESPIRATION RATE: 16 BRPM

## 2018-11-20 PROBLEM — O36.8130 DECREASED FETAL MOVEMENT AFFECTING MANAGEMENT OF PREGNANCY IN THIRD TRIMESTER: Status: ACTIVE | Noted: 2018-11-20

## 2018-11-20 LAB
GLUCOSE, GLUUPC: NORMAL
KETONES UR-MCNC: NEGATIVE MG/DL
PROT UR QL: NEGATIVE

## 2018-11-20 PROCEDURE — 59025 FETAL NON-STRESS TEST: CPT | Performed by: OBSTETRICS & GYNECOLOGY

## 2018-11-20 PROCEDURE — 99282 EMERGENCY DEPT VISIT SF MDM: CPT | Performed by: OBSTETRICS & GYNECOLOGY

## 2018-11-20 PROCEDURE — 76815 OB US LIMITED FETUS(S): CPT

## 2018-11-20 PROCEDURE — 81002 URINALYSIS NONAUTO W/O SCOPE: CPT | Performed by: OBSTETRICS & GYNECOLOGY

## 2018-11-20 NOTE — PROGRESS NOTES
BPP  10/10  's mod variability, accels, no decels    Biophysical- + breathing, + movement, + tone, christel 8.5    For 10/10

## 2018-11-20 NOTE — PROGRESS NOTES
78/83/3714      RE: Pieter Flores      To Whom it May Concern: This is to certify that Pieter Flores may may return to work on 11/21/2018. Please feel free to contact my office if you have any questions or concerns. Thank you for your assistance in this matter.     Sincerely,      Rasheed Camacho RN

## 2018-11-20 NOTE — ED PROVIDER NOTES
Chief Complaint: decreased fetal movement      29 y.o. female  at 34w1d  weeks gestation who is seen for decreased fetal movement. Pt works night shift and has not felt the baby move tonight. No loss of fluid. No vag bleeding or discharge. Having occaisonal mild contractions. No other c/o. HISTORY:    Social History     Substance and Sexual Activity   Sexual Activity Yes    Partners: Male    Birth control/protection: None     Patient's last menstrual period was 2018.     Social History     Socioeconomic History    Marital status: SINGLE     Spouse name: Not on file    Number of children: Not on file    Years of education: Not on file    Highest education level: Not on file   Social Needs    Financial resource strain: Not on file    Food insecurity - worry: Not on file    Food insecurity - inability: Not on file    Transportation needs - medical: Not on file   [employer] needs - non-medical: Not on file   Occupational History    Not on file   Tobacco Use    Smoking status: Never Smoker    Smokeless tobacco: Never Used   Substance and Sexual Activity    Alcohol use: No     Alcohol/week: 0.6 oz     Types: 1 Shots of liquor per week     Comment: Social    Drug use: No    Sexual activity: Yes     Partners: Male     Birth control/protection: None   Other Topics Concern     Service Not Asked    Blood Transfusions Not Asked    Caffeine Concern Not Asked    Occupational Exposure Not Asked    Hobby Hazards Not Asked    Sleep Concern Not Asked    Stress Concern Not Asked    Weight Concern Not Asked    Special Diet Not Asked    Back Care Not Asked    Exercise Not Asked    Bike Helmet Not Asked    Seat Belt Yes    Self-Exams Not Asked   Social History Narrative    Denies physical or sexual abuse       Past Surgical History:   Procedure Laterality Date     DELIVERY ONLY      HX  SECTION  2018    elecitve c/s --multiple fetal anomalies, bpd estimated 10.5cm.  HX COLPOSCOPY  2008    HX GYN  05/24/2017    laparoscopy with CO2 laser of endometriosis, lysis of adhesions, ovarian cystectomy and chromotubastion     HX WISDOM TEETH EXTRACTION         Past Medical History:   Diagnosis Date    Abnormal Papanicolaou smear of cervix     Anemia     Asthma     as a child    Endometriosis-STAGE 4 5/24/2017 5/24/17: dx [facility] w/ [facility] POSTOPERATIVE DIAGNOSES  1. Stage IV endometriosis with involvement of the bladder flap  and adhesions to the anterior lower fundus. 2. Adhesions of the colon to the posterior lower uterine segment. 3. Bilateral adhesions of the ovaries to the pelvic side walls. 4. Endometrioma of the left ovary.  Family history of endometriosis 4/26/2017    Frequent sinus infections     Hypertension     hx of; controlled with diet; no meds    Intramural leiomyoma of uterus 5/18/2017    LGSIL Pap smear of vagina 2009    Migraine without aura, not intractable 7/11/2017    Morbid obesity (HCC)     BMI-35    Rh negative state in antepartum period          ROS:  A 12 point review of symptoms negative except for chief complaint as described above. PHYSICAL EXAM:  Blood pressure 118/69, pulse (!) 103, temperature 98 °F (36.7 °C), resp. rate 16, height 5' 4\" (1.626 m), weight 104.3 kg (230 lb), last menstrual period 03/26/2018, unknown if currently breastfeeding. Constitutional: The patient appears well, alert, oriented x 3. Cardiovascular: Heart RRR, no murmurs.    Respiratory: Lungs clear, no respiratory distress  GI: Abdomen soft, nontender, no guarding  No fundal tenderness  Musculoskeletal: no cva tenderness  Upper ext: no edema, reflexes +2  Lower ext: +1 edema, neg jose m's, reflexes +2  Skin: no rashes or lesions  Psychiatric:Mood/ Affect: appropriate  Genitourinary: SVE:deferred  FHT:reactive, cat 1  TOCO:no contractions  Bedside ultrasound- grade 1 placenta, fetal movement ,tone and breathing seen for a bpp of 10/10    I personally reviewed pt's medical record including relevant labs and ultrasounds    Assessment/Plan:  27 Yo  at 34 weeks with decreased fetal movement  bpp 10/10  Reassurance offered  Encouraged pt to re-schedule appt for today since she has not slept and looks good now

## 2018-11-20 NOTE — DISCHARGE INSTRUCTIONS
DISCHARGE SUMMARY from Nurse    PATIENT INSTRUCTIONS:    After general anesthesia or intravenous sedation, for 24 hours or while taking prescription Narcotics:  · Limit your activities  · Do not drive and operate hazardous machinery  · Do not make important personal or business decisions  · Do  not drink alcoholic beverages  · If you have not urinated within 8 hours after discharge, please contact your surgeon on call. Report the following to your surgeon:  · Excessive pain, swelling, redness or odor of or around the surgical area  · Temperature over 100.5  · Nausea and vomiting lasting longer than 4 hours or if unable to take medications  · Any signs of decreased circulation or nerve impairment to extremity: change in color, persistent  numbness, tingling, coldness or increase pain  · Any questions    What to do at Home:  Recommended activity: Activity as tolerated. If you experience any of the following symptoms your water breaks, bright red bleeding, contractions please follow up with your physician or [facility]. *  Please give a list of your current medications to your Primary Care Provider. *  Please update this list whenever your medications are discontinued, doses are      changed, or new medications (including over-the-counter products) are added. *  Please carry medication information at all times in case of emergency situations. These are general instructions for a healthy lifestyle:    No smoking/ No tobacco products/ Avoid exposure to second hand smoke  Surgeon General's Warning:  Quitting smoking now greatly reduces serious risk to your health.     Obesity, smoking, and sedentary lifestyle greatly increases your risk for illness    A healthy diet, regular physical exercise & weight monitoring are important for maintaining a healthy lifestyle    You may be retaining fluid if you have a history of heart failure or if you experience any of the following symptoms:  Weight gain of 3 pounds or more overnight or 5 pounds in a week, increased swelling in our hands or feet or shortness of breath while lying flat in bed. Please call your doctor as soon as you notice any of these symptoms; do not wait until your next office visit. Recognize signs and symptoms of STROKE:    F-face looks uneven    A-arms unable to move or move unevenly    S-speech slurred or non-existent    T-time-call 911 as soon as signs and symptoms begin-DO NOT go       Back to bed or wait to see if you get better-TIME IS BRAIN. Warning Signs of HEART ATTACK     Call 911 if you have these symptoms:   Chest discomfort. Most heart attacks involve discomfort in the center of the chest that lasts more than a few minutes, or that goes away and comes back. It can feel like uncomfortable pressure, squeezing, fullness, or pain.  Discomfort in other areas of the upper body. Symptoms can include pain or discomfort in one or both arms, the back, neck, jaw, or stomach.  Shortness of breath with or without chest discomfort.  Other signs may include breaking out in a cold sweat, nausea, or lightheadedness. Don't wait more than five minutes to call 911 - MINUTES MATTER! Fast action can save your life. Calling 911 is almost always the fastest way to get lifesaving treatment. Emergency Medical Services staff can begin treatment when they arrive -- up to an hour sooner than if someone gets to the hospital by car. The discharge information has been reviewed with the patient. The patient verbalized understanding. Discharge medications reviewed with the patient and appropriate educational materials and side effects teaching were provided. Weeks 34 to 36 of Your Pregnancy: Care Instructions  Your Care Instructions    By now, your baby and your belly have grown quite large. It is almost time to give birth. A full-term pregnancy can deliver between 37 and 42 weeks. Your baby's lungs are almost ready to breathe air.  The bones in your baby's head are now firm enough to protect it, but soft enough to move down through the birth canal.  You may feel excited, happy, anxious, or scared. You may wonder how you will know if you are in labor or what to expect during labor. Try to be flexible in your expectations of the birth. Because each birth is different, there is no way to know exactly what childbirth will be like for you. This care sheet will help you know what to expect and how to prepare. This may make your childbirth easier. If you haven't already had the Tdap shot during this pregnancy, talk to your doctor about getting it. It will help protect your  against pertussis infection. In the 36th week, most women have a test for group B streptococcus (GBS). GBS is a common bacteria that can live in the vagina and rectum. It can make your baby sick after birth. If you test positive, you will get antibiotics during labor. The medicine will keep your baby from getting the bacteria. Follow-up care is a key part of your treatment and safety. Be sure to make and go to all appointments, and call your doctor if you are having problems. It's also a good idea to know your test results and keep a list of the medicines you take. How can you care for yourself at home? Learn about pain relief choices  · Pain is different for every woman. Talk with your doctor about your feelings about pain. · You can choose from several types of pain relief. These include medicine or breathing techniques, as well as comfort measures. You can use more than one option. · If you choose to have pain medicine during labor, talk to your doctor about your options. Some medicines lower anxiety and help with some of the pain. Others make your lower body numb so that you won't feel pain. · Be sure to tell your doctor about your pain medicine choice before you start labor or very early in your labor.  You may be able to change your mind as labor progresses. · Rarely, a woman is put to sleep by medicine given through a mask or an IV. Labor and delivery  · The first stage of labor has three parts: early, active, and transition. ? Most women have early labor at home. You can stay busy or rest, eat light snacks, drink clear fluids, and start counting contractions. ? When talking during a contraction gets hard, you may be moving to active labor. During active labor, you should head for the hospital if you are not there already. ? You are in active labor when contractions come every 3 to 4 minutes and last about 60 seconds. Your cervix is opening more rapidly. ? If your water breaks, contractions will come faster and stronger. ? During transition, your cervix is stretching, and contractions are coming more rapidly. ? You may want to push, but your cervix might not be ready. Your doctor will tell you when to push. · The second stage starts when your cervix is completely opened and you are ready to push. ? Contractions are very strong to push the baby down the birth canal.  ? You will feel the urge to push. You may feel like you need to have a bowel movement. ? You may be coached to push with contractions. These contractions will be very strong, but you will not have them as often. You can get a little rest between contractions. ? You may be emotional and irritable. You may not be aware of what is going on around you.  ? One last push, and your baby is born. · The third stage is when a few more contractions push out the placenta. This may take 30 minutes or less. · The fourth stage is the welcome recovery. You may feel overwhelmed with emotions and exhausted but alert. This is a good time to start breastfeeding. Where can you learn more? Go to http://matthew-franchesca.info/. Enter I113 in the search box to learn more about \"Weeks 34 to 36 of Your Pregnancy: Care Instructions. \"  Current as of: November 21, 2017  Content Version: 11.8  © 4018-8831 [employer]. Care instructions adapted under license by [employer] (which disclaims liability or warranty for this information). If you have questions about a medical condition or this instruction, always ask your healthcare professional. [address] any warranty or liability for your use of this information.       ___________________________________________________________________________________________________________________________________

## 2018-11-20 NOTE — PROGRESS NOTES
Pt presents to [facility] with c/o decreased fetal movement. Triage assessment completed. Dr. Lala Meza notified.

## 2018-11-20 NOTE — PROGRESS NOTES
Pt given discharge instructions and educated on when to come back to hospital; pt verbalizes understanding. Pt discharged to home.

## 2018-12-03 PROBLEM — O36.8130 DECREASED FETAL MOVEMENT AFFECTING MANAGEMENT OF PREGNANCY IN THIRD TRIMESTER: Status: RESOLVED | Noted: 2018-11-20 | Resolved: 2018-12-03

## 2018-12-05 PROBLEM — O36.5990 POOR FETAL GROWTH, AFFECTING MANAGEMENT OF MOTHER, ANTEPARTUM CONDITION OR COMPLICATION: Status: RESOLVED | Noted: 2018-10-29 | Resolved: 2018-12-05

## 2018-12-05 PROBLEM — O26.893 RH NEGATIVE STATUS DURING PREGNANCY IN THIRD TRIMESTER: Status: ACTIVE | Noted: 2017-07-14

## 2018-12-05 PROBLEM — O09.93 SUPERVISION OF HIGH RISK PREGNANCY IN THIRD TRIMESTER: Status: ACTIVE | Noted: 2017-08-15

## 2018-12-05 PROBLEM — O10.013 PRE-EXISTING ESSENTIAL HYPERTENSION COMPLICATING PREGNANCY IN THIRD TRIMESTER: Status: ACTIVE | Noted: 2017-07-11

## 2018-12-05 PROBLEM — O99.213 OBESITY AFFECTING PREGNANCY IN THIRD TRIMESTER: Status: ACTIVE | Noted: 2017-07-11

## 2018-12-10 ENCOUNTER — APPOINTMENT (OUTPATIENT)
Dept: PHYSICAL THERAPY | Age: 28
End: 2018-12-10
Payer: COMMERCIAL

## 2018-12-13 ENCOUNTER — HOSPITAL ENCOUNTER (OUTPATIENT)
Dept: PHYSICAL THERAPY | Age: 28
Discharge: HOME | End: 2018-12-13
Payer: COMMERCIAL

## 2018-12-13 ENCOUNTER — HOSPITAL ENCOUNTER (OUTPATIENT)
Age: 28
Discharge: HOME | End: 2018-12-13
Attending: OBSTETRICS & GYNECOLOGY | Admitting: OBSTETRICS & GYNECOLOGY
Payer: COMMERCIAL

## 2018-12-13 VITALS
RESPIRATION RATE: 18 BRPM | DIASTOLIC BLOOD PRESSURE: 86 MMHG | SYSTOLIC BLOOD PRESSURE: 130 MMHG | HEART RATE: 100 BPM | TEMPERATURE: 98.5 F

## 2018-12-13 PROBLEM — O42.90 AMNIOTIC FLUID LEAKING: Status: ACTIVE | Noted: 2018-12-13

## 2018-12-13 LAB
A1 MICROGLOB PLACENTAL VAG QL: NEGATIVE
CONTROL LINE PRESENT?: NORMAL
EXPIRATION DATE: NORMAL
INTERNAL NEGATIVE CONTROL: NORMAL
KIT LOT NO.: NORMAL

## 2018-12-13 PROCEDURE — 84112 EVAL AMNIOTIC FLUID PROTEIN: CPT | Performed by: OBSTETRICS & GYNECOLOGY

## 2018-12-13 PROCEDURE — 97140 MANUAL THERAPY 1/> REGIONS: CPT

## 2018-12-13 PROCEDURE — 97161 PT EVAL LOW COMPLEX 20 MIN: CPT

## 2018-12-13 PROCEDURE — 99283 EMERGENCY DEPT VISIT LOW MDM: CPT

## 2018-12-13 NOTE — DISCHARGE INSTRUCTIONS
Learning About Premature Rupture of Membranes (PROM)  What is premature rupture of membranes? Before a baby is born, the amniotic sac breaks open. This causes amniotic fluid to either leak slowly or gush out. It's often called \"having your water break. \" When this happens before contractions start, it is called premature rupture of membranes (PROM). PROM can occur at any time during pregnancy before labor begins. Early PROM can happen before 37 full weeks of pregnancy. Then it's called  premature rupture of membranes, or pPROM. Smoking while pregnant increases the risk of PROM. What happens when you have PROM? · Labor usually starts soon after PROM. If it doesn't, your doctor may induce labor (use medicine to start it). · The amniotic sac protects the baby from infection. After the sac is torn, the risk for infection is much higher. If you think your sac has broken open, avoid letting anything enter your vagina. Don't have sex or flush your vagina with fluid (douche). · After the sac ruptures, the baby moves down into the pelvis. The baby may press on the umbilical cord. This is not common, but it can cut off the baby's oxygen and blood supplies. In that case the baby must be delivered quickly. What are the symptoms? · When your water breaks, it often feels like a large gush of water. Or it may feel like you're leaking a small amount of water. · Water from the amniotic sac is normally a cloudy-white to an alfredo-straw color. If you notice that your water is dark or greenish, foul-smelling, or bloody, tell your doctor. How is PROM treated? · Your doctor will probably have you go to the hospital.  · If labor doesn't start in 12 to 24 hours, your doctor may want to induce. · If your doctor is worried about infection, you may be given antibiotics. Follow-up care is a key part of your treatment and safety. Be sure to make and go to all appointments, and call your doctor if you are having problems. It's also a good idea to know your test results and keep a list of the medicines you take. Where can you learn more? Go to http://matthew-franchesca.info/. Enter I515 in the search box to learn more about \"Learning About Premature Rupture of Membranes (PROM). \"  Current as of: November 21, 2017  Content Version: 11.8  © 5568-6602 [employer]. Care instructions adapted under license by [employer] (which disclaims liability or warranty for this information). If you have questions about a medical condition or this instruction, always ask your healthcare professional. [place] [address] any warranty or liability for your use of this information. Learning About When to Call Your Doctor During Pregnancy (After 20 Weeks)  Your Care Instructions  It's common to have concerns about what might be a problem during pregnancy. Although most pregnant women don't have any serious problems, it's important to know when to call your doctor if you have certain symptoms or signs of labor. These are general suggestions. Your doctor may give you some more information about when to call. When to call your doctor (after 20 weeks)  Call 911 anytime you think you may need emergency care. For example, call if:  · You have severe vaginal bleeding. · You have sudden, severe pain in your belly. · You passed out (lost consciousness). · You have a seizure. · You see or feel the umbilical cord. · You think you are about to deliver your baby and can't make it safely to the hospital.  Call your doctor now or seek immediate medical care if:  · You have vaginal bleeding. · You have belly pain. · You have a fever. · You have symptoms of preeclampsia, such as:  ? Sudden swelling of your face, hands, or feet. ? New vision problems (such as dimness or blurring). ? A severe headache. · You have a sudden release of fluid from your vagina.  (You think your water broke.)  · You think that you may be in labor. This means that you've had at least 4 contractions within 20 minutes or at least 8 contractions in an hour. · You notice that your baby has stopped moving or is moving much less than normal.  · You have symptoms of a urinary tract infection. These may include:  ? Pain or burning when you urinate. ? A frequent need to urinate without being able to pass much urine. ? Pain in the flank, which is just below the rib cage and above the waist on either side of the back. ? Blood in your urine. Watch closely for changes in your health, and be sure to contact your doctor if:  · You have vaginal discharge that smells bad. · You have skin changes, such as:  ? A rash. ? Itching. ? Yellow color to your skin. · You have other concerns about your pregnancy. If you have labor signs at 37 weeks or more  If you have signs of labor at 37 weeks or more, your doctor may tell you to call when your labor becomes more active. Symptoms of active labor include:  · Contractions that are regular. · Contractions that are less than 5 minutes apart. · Contractions that are hard to talk through. Follow-up care is a key part of your treatment and safety. Be sure to make and go to all appointments, and call your doctor if you are having problems. It's also a good idea to know your test results and keep a list of the medicines you take. Where can you learn more? Go to http://matthew-franchesca.info/. Enter  in the search box to learn more about \"Learning About When to Call Your Doctor During Pregnancy (After 20 Weeks). \"  Current as of: November 21, 2017  Content Version: 11.8  © 5841-8939 [employer]. Care instructions adapted under license by [employer] (which disclaims liability or warranty for this information).  If you have questions about a medical condition or this instruction, always ask your healthcare professional. Haley Arreguin any warranty or liability for your use of this information.

## 2018-12-13 NOTE — THERAPY EVALUATION
Curry Dawkins  : 1990  Primary: [facility]  Secondary:  Therapy Center at [address], [address], [place] [address].  [phone]   [phone]       OUTPATIENT PHYSICAL THERAPY:Initial Assessment 2018   ICD-10: Treatment Diagnosis: Low back pain (M54.5)  Precautions/Allergies:   Latex, natural rubber and Dilaudid [hydromorphone]   MD Orders: referral to physical therapy MEDICAL/REFERRING DIAGNOSIS:  Supervision of high risk pregnancy, unspecified, second trimester [O09.92]   DATE OF ONSET: 2 months ago   REFERRING PHYSICIAN: Naomie Manning MD  RETURN PHYSICIAN APPOINTMENT: 18     INITIAL ASSESSMENT:  Ms. Aretha Funk presents with complaints of back pain that started a few months ago. She is in her third trimester of pregnancy with a scheduled  18. She did have a previous  earlier this year. She presents with decreased core strength, decreased body mechanics and increased back pain. She will benefit from skilled physical therapy to help decrease pain and education for body mechanics. She will also benefit from skilled physical therapy after her baby is born for core re-strengthening program due to 2 c-sections this year. PROBLEM LIST (Impacting functional limitations):  1. Decreased Strength  2. Decreased ADL/Functional Activities  3. Increased Pain INTERVENTIONS PLANNED: (Treatment may consist of any combination of the following)  1. Manual Therapy  2. Therapeutic Activites  3. Therapeutic Exercise/Strengthening   4. Modalities as needed for pain   TREATMENT PLAN:  Effective Dates: 2018 TO 2019 (30 days). Frequency/Duration: 2-4 visits   GOALS: (Goals have been discussed and agreed upon with patient.)    Discharge Goals: Time Frame: 2-4 visits  1. She will be independent with HEP. 2. She will demonstrate body mechanics for daily activities to decrease stress on low back.    Rehabilitation Potential For Stated Goals: [address] therapy, I certify that the treatment plan above will be carried out by a therapist or under their direction. Thank you for this referral,    Sheldon Wheeler PT       Referring Physician Signature: Suzanne Tinoco MD              Date                    The information in this section was collected on 18 (except where otherwise noted). HISTORY:   History of Present Injury/Illness (Reason for Referral):  Back pain for ~2 months. Her legs and feet go numb if she stands too long or sits for too long. She has tried sleeping with a pillow between her knees but continues to bother her. Before pregnancy she had low back pain and pelvic pain due to endometriosis. Due date 18.  18. Difficulty getting out of bed. She also has hands tingling. Past Medical History/Comorbidities:   Ms. Santiago Murdock  has a past medical history of Abnormal Papanicolaou smear of cervix, Anemia, Asthma, Endometriosis-STAGE 4, Family history of endometriosis, Frequent sinus infections, Hypertension, Intramural leiomyoma of uterus, LGSIL Pap smear of vagina, Migraine without aura, not intractable, Morbid obesity ([address] and Rh negative state in antepartum period. Ms. Santiago Murdock  has a past surgical history that includes hx colposcopy (); hx wisdom teeth extraction; hx gyn (2017); hx  section (2018); pr  delivery only; and LAPAROSCOPY GYN WITH CO2 LASER OF ENDOMETRIOSIS, LYSIS OF ADHESIONS, OVARIAN CYCSTECTOMY  and CHROMOTUBATION (N/A, 2017). Social History/Living Environment:    Lives with family. 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 Moving around.       Ambulatory/Rehab Services H2 Model Falls Risk Assessment    Risk Factors:       (1)  Altered Elimination Ability to Rise from Chair:       (1)  Pushes up, successful in one attempt Falls Prevention Plan:       No modifications necessary   Total: (5 or greater = High Risk): 2    ©2010 [facility]. All Rights Reserved. [facility] States Patent #[id number]. Federal Law prohibits the replication, distribution or use without written permission from [facility]     Current Medications:       Current Outpatient Medications:     raNITIdine (ZANTAC) 150 mg tablet, Take 1 Tab by mouth two (2) times a day., Disp: 60 Tab, Rfl: 5    -iron-folate 1-dss-dha (VITAFOL FE+, WITH DOCUSATE,) 90 mg iron-1 mg -50 mg-200 mg cap, Take 1 Tab by mouth daily. , Disp: 90 Cap, Rfl: 4    ascorbic acid, vitamin C, (VITAMIN C) 500 mg tablet, Take 1 Tab by mouth two (2) times a day., Disp: 60 Tab, Rfl: 2    acetaminophen (TYLENOL EXTRA STRENGTH) 500 mg tablet, Take  by mouth every six (6) hours as needed for Pain., Disp: , Rfl:     diphenhydrAMINE (BENADRYL) 25 mg capsule, Take 25 mg by mouth every six (6) hours as needed. , Disp: , Rfl:     docusate sodium (COLACE) 50 mg capsule, Take 50 mg by mouth two (2) times a day., Disp: , Rfl:     cholecalciferol, vitamin D3, (VITAMIN D3) 2,000 unit tab, Take  by mouth., Disp: , Rfl:     ferrous sulfate (IRON) 325 mg (65 mg iron) EC tablet, Take 1 Tab by mouth two (2) times a day., Disp: 60 Tab, Rfl: 3   Date Last Reviewed:  12-14-18   Number of Personal Factors/Comorbidities that affect the Plan of Care: 1-2: MODERATE COMPLEXITY   EXAMINATION:   Observation/Orthostatic Postural Assessment:          Pt stands with increased anterior pelvic tilt, increased lumbar lordosis. Palpation:          Tender to L PSIS, tightness in lumbar paraspinals   ROM:          Decreased lumbar ROM due to 3rd trimester of pregnancy   Special Tests:          Pelvic obliquity with R ASIS posterior rotation, sacral dysfunction with sitting forward bend test   Strength: difficulty to assess due to pregnancy    Body Structures Involved:  1. Muscles  2.  Ligaments Body Functions Affected:  1. Neuromusculoskeletal Activities and Participation Affected:  1. Mobility  2. Community, Social and [place]   Number of elements (examined above) that affect the Plan of Care: 3: MODERATE COMPLEXITY   CLINICAL PRESENTATION:   Presentation: Evolving clinical presentation with changing clinical characteristics: MODERATE COMPLEXITY   CLINICAL DECISION MAKING:   Outcome Measure: Tool Used: Modified Oswestry Low Back Pain Questionnaire  Score:  Initial: 21/50  Most Recent: X/50 (Date: -- )   Interpretation of Score: Each section is scored on a 0-5 scale, 5 representing the greatest disability. The scores of each section are added together for a total score of 50. Score 0 1-10 11-20 21-30 31-40 41-49 50   Modifier CH CI CJ CK CL CM CN     Medical Necessity:   · Patient demonstrates good rehab potential due to higher previous functional level. Reason for Services/Other Comments:  · Patient continues to require skilled intervention due to pain. Use of outcome tool(s) and clinical judgement create a POC that gives a: Questionable prediction of patient's progress: MODERATE COMPLEXITY            TREATMENT:   (In addition to Assessment/Re-Assessment sessions the following treatments were rendered)  Pre-treatment Symptoms/Complaints:  Pt reports back pain that goes down her legs. Numbness in her legs. Pain: Initial:     8/10 Post Session:  7/10   MANUAL THERAPY: (15 minutes): Joint mobilization and Soft tissue mobilization was utilized and necessary because of the patient's restricted joint motion. Pt in right side lying and soft tissue mobilization to lumbar paraspinals. L sacral mobilizations with pt in side lying and in sitting. MET for pelvic obliquity with pt in side lying. Discussed HEP with sitting and standing pelvic titls, body mechanics with supine to sit and getting in and out of the car. She is to work on posterior pelvic tilts in standing when her back starts to bother her. She verbalizes understanding. Treatment/Session Assessment:    · Response to Treatment:  Pt reports a slight improvement in pain after manual treatment. · Compliance with Program/Exercises: Will assess as treatment progresses. · Recommendations/Intent for next treatment session: \"Next visit will focus on modalities as needed for pain. \".   Total Treatment Duration: 45 minutes   PT Patient Time In/Time Out  Time In: 1500  Time Out: 420 Gilmer Hernandez, PT    Future Appointments   Date Time Provider Clifford Saenz   12/17/2018  2:30 PM [facility]   12/17/2018  3:00 PM Osvaldo Roth MD [place] [facility]   12/20/2018 10:00 AM Lorraine Mead, PT [facility]   12/24/2018 10:00 AM SFE OB OR [facility]

## 2018-12-13 NOTE — PROGRESS NOTES
[id number]      RE: Maral Chavez      To Whom it May Concern: This is to certify that Maral Chavez may may return to work on 12/14/2018. Please feel free to contact my office if you have any questions or concerns. Thank you for your assistance in this matter.     Sincerely,      Cesar Christian RN

## 2018-12-17 PROBLEM — O42.90 AMNIOTIC FLUID LEAKING: Status: RESOLVED | Noted: 2018-12-13 | Resolved: 2018-12-17

## 2018-12-20 ENCOUNTER — APPOINTMENT (OUTPATIENT)
Dept: PHYSICAL THERAPY | Age: 28
End: 2018-12-20
Payer: COMMERCIAL

## 2018-12-21 ENCOUNTER — HOSPITAL ENCOUNTER (OUTPATIENT)
Dept: PHYSICAL THERAPY | Age: 28
Discharge: HOME | End: 2018-12-21
Payer: COMMERCIAL

## 2018-12-21 PROCEDURE — 97110 THERAPEUTIC EXERCISES: CPT

## 2018-12-21 PROCEDURE — 97140 MANUAL THERAPY 1/> REGIONS: CPT

## 2018-12-21 NOTE — PROGRESS NOTES
Patient ID verified. Allergies, medical history, prenatal record and prior to admission medications verified. Pt instructed to be NPO after midnight. Pt instructed to arrive at hospital @0745,come to entrance C and sign in at the registration desk on the 4th floor. Patient instructed to come to hospital sooner if SROM, labor, or concerning symptoms. Patient verbalized understanding. Questions encouraged and answered.

## 2018-12-21 NOTE — PROGRESS NOTES
Danny Dawkins  : 1990  Primary: [employer] [facility]  Secondary:  [facility]rapy Center at [facility] [address], [address], [address] [facility]  [phone]   [phone]       OUTPATIENT PHYSICAL THERAPY:Daily Note 2018   ICD-10: Treatment Diagnosis: Low back pain (M54.5)  Precautions/Allergies:   Latex, natural rubber and Dilaudid [hydromorphone]   MD Orders: referral to physical therapy MEDICAL/REFERRING DIAGNOSIS:  Supervision of high risk pregnancy, unspecified, second trimester [O09.92]   DATE OF ONSET: 2 months ago   REFERRING PHYSICIAN: Millicent Gooden MD  RETURN PHYSICIAN APPOINTMENT: 18     INITIAL ASSESSMENT:  Ms. Delbert Guillaume presents with complaints of back pain that started a few months ago. She is in her third trimester of pregnancy with a scheduled  18. She did have a previous  earlier this year. She presents with decreased core strength, decreased body mechanics and increased back pain. She will benefit from skilled physical therapy to help decrease pain and education for body mechanics. She will also benefit from skilled physical therapy after her baby is born for core re-strengthening program due to 2 c-sections this year. PROBLEM LIST (Impacting functional limitations):  1. Decreased Strength  2. Decreased ADL/Functional Activities  3. Increased Pain INTERVENTIONS PLANNED: (Treatment may consist of any combination of the following)  1. Manual Therapy  2. Therapeutic Activites  3. Therapeutic Exercise/Strengthening   4. Modalities as needed for pain   TREATMENT PLAN:  Effective Dates: 2018 TO 2019 (30 days). Frequency/Duration: 2-4 visits   GOALS: (Goals have been discussed and agreed upon with patient.)    Discharge Goals: Time Frame: 2-4 visits  1. She will be independent with HEP. 2. She will demonstrate body mechanics for daily activities to decrease stress on low back.    Rehabilitation Potential For Stated Goals: Fair              The information in this section was collected on 18 (except where otherwise noted). HISTORY:   History of Present Injury/Illness (Reason for Referral):  Back pain for ~2 months. Her legs and feet go numb if she stands too long or sits for too long. She has tried sleeping with a pillow between her knees but continues to bother her. Before pregnancy she had low back pain and pelvic pain due to endometriosis. Due date 18.  18. Difficulty getting out of bed. She also has hands tingling. Past Medical History/Comorbidities:   Ms. Ferna Hatchet  has a past medical history of Abnormal Papanicolaou smear of cervix, Anemia, Asthma, Endometriosis-STAGE 4, Family history of endometriosis, Frequent sinus infections, Hypertension, Intramural leiomyoma of uterus, LGSIL Pap smear of vagina, Migraine without aura, not intractable, Morbid obesity ([address] and Rh negative state in antepartum period. Ms. Ferna Hatchet  has a past surgical history that includes hx colposcopy (); hx wisdom teeth extraction; hx gyn (2017); hx  section (2018); pr  delivery only; and LAPAROSCOPY GYN WITH CO2 LASER OF ENDOMETRIOSIS, LYSIS OF ADHESIONS, OVARIAN CYCSTECTOMY  and CHROMOTUBATION (N/A, 2017). Social History/Living Environment:    Lives with family. Prior Level of Function/Work/Activity:  Works in the  at [employer]. She is on her feet at work. 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 Moving around. Ambulatory/Rehab Services H2 Model Falls Risk Assessment    Risk Factors:       (1)  Altered Elimination Ability to Rise from Chair:       (1)  Pushes up, successful in one attempt    Falls Prevention Plan:       No modifications necessary   Total: (5 or greater = High Risk): 2    © [employer]. All Rights Reserved. [facility] States Patent #[id number].  Federal Law prohibits the replication, distribution or use without written permission from [employer]     Current Medications:       Current Outpatient Medications:     raNITIdine (ZANTAC) 150 mg tablet, Take 1 Tab by mouth two (2) times a day., Disp: 60 Tab, Rfl: 5    -iron-folate 1-dss-dha (VITAFOL FE+, WITH DOCUSATE,) 90 mg iron-1 mg -50 mg-200 mg cap, Take 1 Tab by mouth daily. , Disp: 90 Cap, Rfl: 4    ascorbic acid, vitamin C, (VITAMIN C) 500 mg tablet, Take 1 Tab by mouth two (2) times a day., Disp: 60 Tab, Rfl: 2    acetaminophen (TYLENOL EXTRA STRENGTH) 500 mg tablet, Take  by mouth every six (6) hours as needed for Pain., Disp: , Rfl:     diphenhydrAMINE (BENADRYL) 25 mg capsule, Take 25 mg by mouth every six (6) hours as needed. , Disp: , Rfl:     docusate sodium (COLACE) 50 mg capsule, Take 50 mg by mouth two (2) times a day., Disp: , Rfl:     cholecalciferol, vitamin D3, (VITAMIN D3) 2,000 unit tab, Take  by mouth., Disp: , Rfl:     ferrous sulfate (IRON) 325 mg (65 mg iron) EC tablet, Take 1 Tab by mouth two (2) times a day., Disp: 60 Tab, Rfl: 3   Date Last Reviewed:  12-21-18   Number of Personal Factors/Comorbidities that affect the Plan of Care: 1-2: MODERATE COMPLEXITY   EXAMINATION:   Observation/Orthostatic Postural Assessment:          Pt stands with increased anterior pelvic tilt, increased lumbar lordosis. Palpation:          Tender to L PSIS, tightness in lumbar paraspinals   ROM:          Decreased lumbar ROM due to 3rd trimester of pregnancy   Special Tests:          Pelvic obliquity with R ASIS posterior rotation, sacral dysfunction with sitting forward bend test   Strength: difficulty to assess due to pregnancy    Body Structures Involved:  1. Muscles  2. Ligaments Body Functions Affected:  1. Neuromusculoskeletal Activities and Participation Affected:  1. Mobility  2.  Community, Social and [place]   Number of elements (examined above) that affect the Plan of Care: 3: MODERATE COMPLEXITY   CLINICAL PRESENTATION:   Presentation: Evolving clinical presentation with changing clinical characteristics: MODERATE COMPLEXITY   CLINICAL DECISION MAKING:   Outcome Measure: Tool Used: Modified Oswestry Low Back Pain Questionnaire  Score:  Initial: 21/50  Most Recent: X/50 (Date: -- )   Interpretation of Score: Each section is scored on a 0-5 scale, 5 representing the greatest disability. The scores of each section are added together for a total score of 50. Score 0 1-10 11-20 21-30 31-40 41-49 50   Modifier CH CI CJ CK CL CM CN     Medical Necessity:   · Patient demonstrates good rehab potential due to higher previous functional level. Reason for Services/Other Comments:  · Patient continues to require skilled intervention due to pain. Use of outcome tool(s) and clinical judgement create a POC that gives a: Questionable prediction of patient's progress: MODERATE COMPLEXITY            TREATMENT:   (In addition to Assessment/Re-Assessment sessions the following treatments were rendered)  Pre-treatment Symptoms/Complaints:  Pt reports back pain that goes down her legs. Numbness in her legs. Pain: Initial:     8/10 Post Session:  7/10   MANUAL THERAPY: (30 minutes): Joint mobilization and Soft tissue mobilization was utilized and necessary because of the patient's restricted joint motion. Pt sitting and leaning forward over ball and soft tissue mobilization to lumbar paraspinals. L sacral mobilizations with pt in sitting. Therapeutic Exercise: ( 8 minutes) Discussed HEP with sitting and standing pelvic titls, body mechanics with supine to sit and getting in and out of the car. Discussed getting out of bed with rolling and sitting. Practiced isometric abdominals in sitting and she can start working on that after she has the baby. Demonstrated exercises for pt to try after she has the baby 4-6 week post partum. With supine TA and supine TA with bridge.      Treatment/Session Assessment:    · Response to Treatment:  She seems to understand body mechanics to protect low back. She will probably need to return to therapy after delivering the baby for core re-training. · Compliance with Program/Exercises: Will assess as treatment progresses. · Recommendations/Intent for next treatment session: \"Next visit will focus on modalities as needed for pain. \".   Total Treatment Duration: 38 minutes   PT Patient Time In/Time Out  Time In: 1000  Time Out: 1000 [facility],     Future Appointments   Date Time Provider Clifford Saenz   12/24/2018 10:00 AM [facility] OB OR [facility]   1/10/2019 10:30 AM Doni Narayanan MD [place] [facility] [address] [place] [address]

## 2018-12-23 ENCOUNTER — ANESTHESIA EVENT (OUTPATIENT)
Dept: LABOR AND DELIVERY | Age: 28
End: 2018-12-23
Payer: COMMERCIAL

## 2018-12-24 ENCOUNTER — HOSPITAL ENCOUNTER (INPATIENT)
Age: 28
LOS: 3 days | Discharge: HOME | End: 2018-12-27
Attending: OBSTETRICS & GYNECOLOGY | Admitting: OBSTETRICS & GYNECOLOGY
Payer: COMMERCIAL

## 2018-12-24 ENCOUNTER — ANESTHESIA (OUTPATIENT)
Dept: LABOR AND DELIVERY | Age: 28
End: 2018-12-24
Payer: COMMERCIAL

## 2018-12-24 DIAGNOSIS — G89.18 POSTOPERATIVE PAIN: Primary | ICD-10-CM

## 2018-12-24 PROBLEM — Z98.891 PREVIOUS CESAREAN SECTION: Status: ACTIVE | Noted: 2018-12-24

## 2018-12-24 LAB
ABO + RH BLD: NORMAL
BLOOD GROUP ANTIBODIES SERPL: NORMAL
ERYTHROCYTE [DISTWIDTH] IN BLOOD BY AUTOMATED COUNT: 12.9 % (ref 11.9–14.6)
HCT VFR BLD AUTO: 38.4 % (ref 35.8–46.3)
HGB BLD-MCNC: 12.8 G/DL (ref 11.7–15.4)
MCH RBC QN AUTO: 29.2 PG (ref 26.1–32.9)
MCHC RBC AUTO-ENTMCNC: 33.3 G/DL (ref 31.4–35)
MCV RBC AUTO: 87.5 FL (ref 79.6–97.8)
NRBC # BLD: 0 K/UL (ref 0–0.2)
PLATELET # BLD AUTO: 164 K/UL (ref 150–450)
PMV BLD AUTO: 10.5 FL (ref 9.4–12.3)
RBC # BLD AUTO: 4.39 M/UL (ref 4.05–5.2)
SPECIMEN EXP DATE BLD: NORMAL
WBC # BLD AUTO: 9.7 K/UL (ref 4.3–11.1)

## 2018-12-24 PROCEDURE — 77030039266 HC ADH SKN EXOFIN S2SG -A: Performed by: OBSTETRICS & GYNECOLOGY

## 2018-12-24 PROCEDURE — 4A1HXCZ MONITORING OF PRODUCTS OF CONCEPTION, CARDIAC RATE, EXTERNAL APPROACH: ICD-10-PCS | Performed by: OBSTETRICS & GYNECOLOGY

## 2018-12-24 PROCEDURE — 77030003665 HC NDL SPN BBMI -A: Performed by: ANESTHESIOLOGY

## 2018-12-24 PROCEDURE — 76010000391 HC C SECN FIRST 1 HR: Performed by: OBSTETRICS & GYNECOLOGY

## 2018-12-24 PROCEDURE — 74011250637 HC RX REV CODE- 250/637: Performed by: OBSTETRICS & GYNECOLOGY

## 2018-12-24 PROCEDURE — 74011250637 HC RX REV CODE- 250/637

## 2018-12-24 PROCEDURE — 77030002888 HC SUT CHRMC J&J -A: Performed by: OBSTETRICS & GYNECOLOGY

## 2018-12-24 PROCEDURE — 85027 COMPLETE CBC AUTOMATED: CPT

## 2018-12-24 PROCEDURE — 77030002933 HC SUT MCRYL J&J -A: Performed by: OBSTETRICS & GYNECOLOGY

## 2018-12-24 PROCEDURE — 77030009363 HC CUP VAC EXTRCT CNCI -B: Performed by: OBSTETRICS & GYNECOLOGY

## 2018-12-24 PROCEDURE — 75410000003 HC RECOV DEL/VAG/CSECN EA 0.5 HR: Performed by: OBSTETRICS & GYNECOLOGY

## 2018-12-24 PROCEDURE — 65270000029 HC RM PRIVATE

## 2018-12-24 PROCEDURE — 77030031139 HC SUT VCRL2 J&J -A: Performed by: OBSTETRICS & GYNECOLOGY

## 2018-12-24 PROCEDURE — 74011250636 HC RX REV CODE- 250/636

## 2018-12-24 PROCEDURE — 77030018836 HC SOL IRR NACL ICUM -A: Performed by: OBSTETRICS & GYNECOLOGY

## 2018-12-24 PROCEDURE — 76060000078 HC EPIDURAL ANESTHESIA: Performed by: OBSTETRICS & GYNECOLOGY

## 2018-12-24 PROCEDURE — 74011000250 HC RX REV CODE- 250

## 2018-12-24 PROCEDURE — 77030007880 HC KT SPN EPDRL BBMI -B: Performed by: ANESTHESIOLOGY

## 2018-12-24 PROCEDURE — 77030027744 HC PWDR HEMSTAT ARISTA ABSRB 5GM BARD -D: Performed by: OBSTETRICS & GYNECOLOGY

## 2018-12-24 PROCEDURE — 74011250636 HC RX REV CODE- 250/636: Performed by: OBSTETRICS & GYNECOLOGY

## 2018-12-24 PROCEDURE — 77030002974 HC SUT PLN J&J -A: Performed by: OBSTETRICS & GYNECOLOGY

## 2018-12-24 PROCEDURE — 76010000392 HC C SECN EA ADDL 0.5 HR: Performed by: OBSTETRICS & GYNECOLOGY

## 2018-12-24 PROCEDURE — 86900 BLOOD TYPING SEROLOGIC ABO: CPT

## 2018-12-24 PROCEDURE — 77030034696 HC CATH URETH FOL 2W BARD -A: Performed by: OBSTETRICS & GYNECOLOGY

## 2018-12-24 PROCEDURE — 77030018846 HC SOL IRR STRL H20 ICUM -A: Performed by: OBSTETRICS & GYNECOLOGY

## 2018-12-24 PROCEDURE — 77030032490 HC SLV COMPR SCD KNE COVD -B: Performed by: OBSTETRICS & GYNECOLOGY

## 2018-12-24 PROCEDURE — 74011250637 HC RX REV CODE- 250/637: Performed by: ANESTHESIOLOGY

## 2018-12-24 PROCEDURE — 74011250636 HC RX REV CODE- 250/636: Performed by: ANESTHESIOLOGY

## 2018-12-24 PROCEDURE — 77030020255 HC SOL INJ LR 1000ML BG

## 2018-12-24 RX ORDER — ACETAMINOPHEN 500 MG
1000 TABLET ORAL EVERY 6 HOURS
Status: DISCONTINUED | OUTPATIENT
Start: 2018-12-24 | End: 2018-12-25

## 2018-12-24 RX ORDER — BUPIVACAINE HYDROCHLORIDE 7.5 MG/ML
INJECTION, SOLUTION INTRASPINAL
Status: COMPLETED | OUTPATIENT
Start: 2018-12-24 | End: 2018-12-24

## 2018-12-24 RX ORDER — EPHEDRINE SULFATE 50 MG/ML
INJECTION, SOLUTION INTRAVENOUS AS NEEDED
Status: DISCONTINUED | OUTPATIENT
Start: 2018-12-24 | End: 2018-12-24 | Stop reason: HOSPADM

## 2018-12-24 RX ORDER — ONDANSETRON 2 MG/ML
4 INJECTION INTRAMUSCULAR; INTRAVENOUS
Status: ACTIVE | OUTPATIENT
Start: 2018-12-24 | End: 2018-12-25

## 2018-12-24 RX ORDER — SODIUM CHLORIDE 0.9 % (FLUSH) 0.9 %
5-10 SYRINGE (ML) INJECTION EVERY 8 HOURS
Status: DISCONTINUED | OUTPATIENT
Start: 2018-12-24 | End: 2018-12-24

## 2018-12-24 RX ORDER — ONDANSETRON 2 MG/ML
INJECTION INTRAMUSCULAR; INTRAVENOUS AS NEEDED
Status: DISCONTINUED | OUTPATIENT
Start: 2018-12-24 | End: 2018-12-24 | Stop reason: HOSPADM

## 2018-12-24 RX ORDER — SODIUM CHLORIDE, SODIUM LACTATE, POTASSIUM CHLORIDE, CALCIUM CHLORIDE 600; 310; 30; 20 MG/100ML; MG/100ML; MG/100ML; MG/100ML
50 INJECTION, SOLUTION INTRAVENOUS CONTINUOUS
Status: DISCONTINUED | OUTPATIENT
Start: 2018-12-24 | End: 2018-12-25

## 2018-12-24 RX ORDER — MISOPROSTOL 200 UG/1
200 TABLET ORAL EVERY 4 HOURS
Status: DISCONTINUED | OUTPATIENT
Start: 2018-12-24 | End: 2018-12-25

## 2018-12-24 RX ORDER — NALOXONE HYDROCHLORIDE 0.4 MG/ML
0.2 INJECTION, SOLUTION INTRAMUSCULAR; INTRAVENOUS; SUBCUTANEOUS
Status: ACTIVE | OUTPATIENT
Start: 2018-12-24 | End: 2018-12-25

## 2018-12-24 RX ORDER — SIMETHICONE 80 MG
80 TABLET,CHEWABLE ORAL
Status: DISCONTINUED | OUTPATIENT
Start: 2018-12-24 | End: 2018-12-27 | Stop reason: HOSPADM

## 2018-12-24 RX ORDER — SODIUM CHLORIDE 0.9 % (FLUSH) 0.9 %
5-10 SYRINGE (ML) INJECTION EVERY 8 HOURS
Status: DISCONTINUED | OUTPATIENT
Start: 2018-12-24 | End: 2018-12-25

## 2018-12-24 RX ORDER — OXYTOCIN/RINGER'S LACTATE 30/500 ML
PLASTIC BAG, INJECTION (ML) INTRAVENOUS
Status: DISCONTINUED | OUTPATIENT
Start: 2018-12-24 | End: 2018-12-24 | Stop reason: HOSPADM

## 2018-12-24 RX ORDER — MISOPROSTOL 200 UG/1
TABLET ORAL
Status: COMPLETED
Start: 2018-12-24 | End: 2018-12-24

## 2018-12-24 RX ORDER — TRISODIUM CITRATE DIHYDRATE AND CITRIC ACID MONOHYDRATE 500; 334 MG/5ML; MG/5ML
30 SOLUTION ORAL
Status: COMPLETED | OUTPATIENT
Start: 2018-12-24 | End: 2018-12-24

## 2018-12-24 RX ORDER — DEXTROSE, SODIUM CHLORIDE, SODIUM LACTATE, POTASSIUM CHLORIDE, AND CALCIUM CHLORIDE 5; .6; .31; .03; .02 G/100ML; G/100ML; G/100ML; G/100ML; G/100ML
125 INJECTION, SOLUTION INTRAVENOUS CONTINUOUS
Status: DISCONTINUED | OUTPATIENT
Start: 2018-12-24 | End: 2018-12-27 | Stop reason: HOSPADM

## 2018-12-24 RX ORDER — CEFAZOLIN SODIUM/WATER 2 G/20 ML
2 SYRINGE (ML) INTRAVENOUS ONCE
Status: COMPLETED | OUTPATIENT
Start: 2018-12-24 | End: 2018-12-24

## 2018-12-24 RX ORDER — SODIUM CHLORIDE 0.9 % (FLUSH) 0.9 %
5-10 SYRINGE (ML) INJECTION AS NEEDED
Status: DISCONTINUED | OUTPATIENT
Start: 2018-12-24 | End: 2018-12-27 | Stop reason: HOSPADM

## 2018-12-24 RX ORDER — NALBUPHINE HYDROCHLORIDE 10 MG/ML
5 INJECTION, SOLUTION INTRAMUSCULAR; INTRAVENOUS; SUBCUTANEOUS
Status: ACTIVE | OUTPATIENT
Start: 2018-12-24 | End: 2018-12-25

## 2018-12-24 RX ORDER — DOCUSATE SODIUM 100 MG/1
100 CAPSULE, LIQUID FILLED ORAL 2 TIMES DAILY
Status: DISCONTINUED | OUTPATIENT
Start: 2018-12-24 | End: 2018-12-27 | Stop reason: HOSPADM

## 2018-12-24 RX ORDER — MORPHINE SULFATE 2 MG/ML
2 INJECTION, SOLUTION INTRAMUSCULAR; INTRAVENOUS
Status: ACTIVE | OUTPATIENT
Start: 2018-12-24 | End: 2018-12-25

## 2018-12-24 RX ORDER — SODIUM CHLORIDE 0.9 % (FLUSH) 0.9 %
5-10 SYRINGE (ML) INJECTION AS NEEDED
Status: DISCONTINUED | OUTPATIENT
Start: 2018-12-24 | End: 2018-12-24

## 2018-12-24 RX ORDER — OXYCODONE HYDROCHLORIDE 5 MG/1
10 TABLET ORAL
Status: DISPENSED | OUTPATIENT
Start: 2018-12-24 | End: 2018-12-25

## 2018-12-24 RX ORDER — TRANEXAMIC ACID 100 MG/ML
INJECTION, SOLUTION INTRAVENOUS AS NEEDED
Status: DISCONTINUED | OUTPATIENT
Start: 2018-12-24 | End: 2018-12-24 | Stop reason: HOSPADM

## 2018-12-24 RX ORDER — SODIUM CHLORIDE, SODIUM LACTATE, POTASSIUM CHLORIDE, CALCIUM CHLORIDE 600; 310; 30; 20 MG/100ML; MG/100ML; MG/100ML; MG/100ML
125 INJECTION, SOLUTION INTRAVENOUS CONTINUOUS
Status: DISCONTINUED | OUTPATIENT
Start: 2018-12-24 | End: 2018-12-24 | Stop reason: HOSPADM

## 2018-12-24 RX ORDER — MORPHINE SULFATE 1 MG/ML
INJECTION, SOLUTION EPIDURAL; INTRATHECAL; INTRAVENOUS
Status: COMPLETED | OUTPATIENT
Start: 2018-12-24 | End: 2018-12-24

## 2018-12-24 RX ORDER — KETOROLAC TROMETHAMINE 30 MG/ML
30 INJECTION, SOLUTION INTRAMUSCULAR; INTRAVENOUS EVERY 6 HOURS
Status: DISCONTINUED | OUTPATIENT
Start: 2018-12-24 | End: 2018-12-25

## 2018-12-24 RX ORDER — FENTANYL CITRATE 50 UG/ML
INJECTION, SOLUTION INTRAMUSCULAR; INTRAVENOUS
Status: COMPLETED | OUTPATIENT
Start: 2018-12-24 | End: 2018-12-24

## 2018-12-24 RX ADMIN — KETOROLAC TROMETHAMINE 30 MG: 30 INJECTION, SOLUTION INTRAMUSCULAR at 22:11

## 2018-12-24 RX ADMIN — Medication 2 G: at 10:15

## 2018-12-24 RX ADMIN — EPHEDRINE SULFATE 10 MG: 50 INJECTION, SOLUTION INTRAVENOUS at 10:34

## 2018-12-24 RX ADMIN — ACETAMINOPHEN 1000 MG: 500 TABLET, FILM COATED ORAL at 20:59

## 2018-12-24 RX ADMIN — MORPHINE SULFATE 0.15 MG: 1 INJECTION, SOLUTION EPIDURAL; INTRATHECAL; INTRAVENOUS at 10:32

## 2018-12-24 RX ADMIN — SODIUM CHLORIDE, SODIUM LACTATE, POTASSIUM CHLORIDE, AND CALCIUM CHLORIDE 500 ML: 600; 310; 30; 20 INJECTION, SOLUTION INTRAVENOUS at 08:15

## 2018-12-24 RX ADMIN — MISOPROSTOL 200 MCG: 200 TABLET ORAL at 12:43

## 2018-12-24 RX ADMIN — SODIUM CHLORIDE, SODIUM LACTATE, POTASSIUM CHLORIDE, AND CALCIUM CHLORIDE: 600; 310; 30; 20 INJECTION, SOLUTION INTRAVENOUS at 10:24

## 2018-12-24 RX ADMIN — ACETAMINOPHEN 1000 MG: 500 TABLET, FILM COATED ORAL at 14:45

## 2018-12-24 RX ADMIN — SODIUM CITRATE AND CITRIC ACID MONOHYDRATE 30 ML: 500; 334 SOLUTION ORAL at 09:37

## 2018-12-24 RX ADMIN — EPHEDRINE SULFATE 10 MG: 50 INJECTION, SOLUTION INTRAVENOUS at 10:57

## 2018-12-24 RX ADMIN — KETOROLAC TROMETHAMINE 30 MG: 30 INJECTION, SOLUTION INTRAMUSCULAR at 16:11

## 2018-12-24 RX ADMIN — FENTANYL CITRATE 20 MCG: 50 INJECTION, SOLUTION INTRAMUSCULAR; INTRAVENOUS at 10:32

## 2018-12-24 RX ADMIN — EPHEDRINE SULFATE 10 MG: 50 INJECTION, SOLUTION INTRAVENOUS at 11:06

## 2018-12-24 RX ADMIN — ONDANSETRON 4 MG: 2 INJECTION INTRAMUSCULAR; INTRAVENOUS at 11:05

## 2018-12-24 RX ADMIN — SODIUM CHLORIDE, SODIUM LACTATE, POTASSIUM CHLORIDE, AND CALCIUM CHLORIDE 50 ML/HR: 600; 310; 30; 20 INJECTION, SOLUTION INTRAVENOUS at 15:35

## 2018-12-24 RX ADMIN — BUPIVACAINE HYDROCHLORIDE 12 MG: 7.5 INJECTION, SOLUTION INTRASPINAL at 10:32

## 2018-12-24 RX ADMIN — MISOPROSTOL 200 MCG: 200 TABLET ORAL at 16:43

## 2018-12-24 RX ADMIN — MISOPROSTOL 200 MCG: 200 TABLET ORAL at 20:59

## 2018-12-24 RX ADMIN — Medication 350 ML/HR: at 11:00

## 2018-12-24 RX ADMIN — SODIUM CHLORIDE, SODIUM LACTATE, POTASSIUM CHLORIDE, AND CALCIUM CHLORIDE: 600; 310; 30; 20 INJECTION, SOLUTION INTRAVENOUS at 10:54

## 2018-12-24 RX ADMIN — OXYCODONE HYDROCHLORIDE 10 MG: 5 TABLET ORAL at 14:45

## 2018-12-24 RX ADMIN — TRANEXAMIC ACID 1000 MG: 100 INJECTION, SOLUTION INTRAVENOUS at 11:18

## 2018-12-24 RX ADMIN — SIMETHICONE CHEW TAB 80 MG 80 MG: 80 TABLET ORAL at 22:11

## 2018-12-24 RX ADMIN — SIMETHICONE CHEW TAB 80 MG 80 MG: 80 TABLET ORAL at 16:43

## 2018-12-24 RX ADMIN — DOCUSATE SODIUM 100 MG: 100 CAPSULE, LIQUID FILLED ORAL at 19:05

## 2018-12-24 RX ADMIN — EPHEDRINE SULFATE 10 MG: 50 INJECTION, SOLUTION INTRAVENOUS at 10:35

## 2018-12-24 NOTE — PROGRESS NOTES
Pt admitted to [address] [place] [address] for scheduled  section. IV started and labs sent. Consents witnessed.

## 2018-12-24 NOTE — ANESTHESIA PREPROCEDURE EVALUATION
Anesthetic History   No history of anesthetic complications            Review of Systems / Medical History  Patient summary reviewed, nursing notes reviewed and pertinent labs reviewed    Pulmonary            Asthma (childhood) : well controlled       Neuro/Psych   Within defined limits           Cardiovascular    Hypertension: well controlled              Exercise tolerance: >4 METS     GI/Hepatic/Renal  Within defined limits              Endo/Other        Morbid obesity     Other Findings              Physical Exam    Airway  Mallampati: III      Mouth opening: Normal     Cardiovascular  Regular rate and rhythm,  S1 and S2 normal,  no murmur, click, rub, or gallop             Dental  No notable dental hx       Pulmonary  Breath sounds clear to auscultation               Abdominal         Other Findings            Anesthetic Plan    ASA: 3  Anesthesia type: spinal      Post-op pain plan if not by surgeon: intrathecal opiates      Anesthetic plan and risks discussed with: Patient and Mother

## 2018-12-24 NOTE — H&P
History & Physical    Name: Helena Hugo MRN: [id number]  SSN: [id number]    YOB: 1990  Age: 29 y.o. Sex: female      Subjective:     Estimated Date of Delivery: 18  OB History    Para Term  AB Living   2 1 1 0 0 0   SAB TAB Ectopic Molar Multiple Live Births   0 0 0 0 0 1      # Outcome Date GA Lbr Mitchell/2nd Weight Sex Delivery Anes PTL Lv   2 Current            1 Term 18 38w4d  8 lb 2.9 oz (3.711 kg) M CS-LTranv  N DEC      Birth Comments: lived 24 days. multiple fetal anomalies      Obstetric Comments   [facility])       Ms. Dawkins admitted with pregnancy at 39w0d for  section due to previous  section. Prenatal course was complicated by obesity. Also preexisting chtn. On no meds. Please see prenatal records for details. Past Medical History:   Diagnosis Date    Abnormal Papanicolaou smear of cervix     Anemia     Asthma     as a child    Endometriosis-STAGE 4 2017: dx lsc w/ [place] POSTOPERATIVE DIAGNOSES  1. Stage IV endometriosis with involvement of the bladder flap  and adhesions to the anterior lower fundus. 2. Adhesions of the colon to the posterior lower uterine segment. 3. Bilateral adhesions of the ovaries to the pelvic side walls. 4. Endometrioma of the left ovary.  Family history of endometriosis 2017    Frequent sinus infections     Hypertension     hx of; controlled with diet; no meds    Intramural leiomyoma of uterus 2017    LGSIL Pap smear of vagina     Migraine without aura, not intractable 2017    Morbid obesity (HCC)     BMI-35    Rh negative state in antepartum period      Past Surgical History:   Procedure Laterality Date     DELIVERY ONLY      HX  SECTION  2018    elecitve c/s --multiple fetal anomalies, bpd estimated 10.5cm.     HX COLPOSCOPY      HX GYN  2017    laparoscopy with CO2 laser of endometriosis, lysis of adhesions, ovarian cystectomy and chromotubastion     HX WISDOM TEETH EXTRACTION       Social History     Occupational History    Not on file   Tobacco Use    Smoking status: Never Smoker    Smokeless tobacco: Never Used   Substance and Sexual Activity    Alcohol use: No     Alcohol/week: 0.6 oz     Types: 1 Shots of liquor per week     Comment: Social    Drug use: No    Sexual activity: Yes     Partners: Male     Birth control/protection: None     Family History   Problem Relation Age of Onset    Depression Other     Diabetes Maternal Grandmother     Hypertension Maternal Grandmother     Diabetes Paternal Grandmother     Hypertension Paternal Grandmother     No Known Problems Mother     No Known Problems Father     Breast Cancer Neg Hx     Ovarian Cancer Neg Hx     Colon Cancer Neg Hx        Allergies   Allergen Reactions    Latex, Natural Rubber Rash    Dilaudid [Hydromorphone] Itching     Prior to Admission medications    Medication Sig Start Date End Date Taking? Authorizing Provider   -iron-folate 1-dss-dha (VITAFOL FE+, WITH DOCUSATE,) 90 mg iron-1 mg -50 mg-200 mg cap Take 1 Tab by mouth daily. 9/10/18  Yes Zina Yañez MD   ascorbic acid, vitamin C, (VITAMIN C) 500 mg tablet Take 1 Tab by mouth two (2) times a day. 9/10/18  Yes Zina Yañez MD   diphenhydrAMINE (BENADRYL) 25 mg capsule Take 25 mg by mouth every six (6) hours as needed. Yes Provider, Historical   cholecalciferol, vitamin D3, (VITAMIN D3) 2,000 unit tab Take  by mouth. Yes Provider, Historical   raNITIdine (ZANTAC) 150 mg tablet Take 1 Tab by mouth two (2) times a day. 12/5/18   Michelle Ocampo MD   acetaminophen (TYLENOL EXTRA STRENGTH) 500 mg tablet Take  by mouth every six (6) hours as needed for Pain. Provider, Historical   docusate sodium (COLACE) 50 mg capsule Take 50 mg by mouth two (2) times a day.     Provider, Historical   ferrous sulfate (IRON) 325 mg (65 mg iron) EC tablet Take 1 Tab by mouth two (2) times a day. 17   Eboni Domingo MD        Review of Systems: Pertinent items are noted in the History of Present Illness. Objective:     Vitals:  Vitals:    18 0804 18 0806   BP: 132/78    Pulse: (!) 102    Resp: 18    Temp: 98.3 °F (36.8 °C)    Weight:  237 lb (107.5 kg)   Height:  5' 4\" (1.626 m)        Physical Exam:  Patient without distress. Heart: Regular rate and rhythm  Lung: clear to auscultation throughout lung fields, no wheezes, no rales, no rhonchi and normal respiratory effort  Membranes:  Intact  Fetal Heart Rate: Reactive    Prenatal Labs:   Lab Results   Component Value Date/Time    ABO/Rh(D) O NEGATIVE 2018 08:16 AM    Rubella, External Immune 2018    GrBStrep, External Negative 2018    HBsAg, External Negative 2018    HIV, External Negative 2018    RPR, External Negative 2018    ABO,Rh O Negative 2018         Impression/Plan:     Plan:  Admit for  section. Group B Strep was negative. Discussed the risks of surgery including the risks of bleeding, infection, deep vein thrombosis, and surgical injuries to internal organs including but not limited to the bowels, bladder, rectum, and female reproductive organs. The patient understands the risks; any and all questions were answered to the patient's satisfaction.     Signed By:  Evan Emery MD     2018

## 2018-12-24 NOTE — LACTATION NOTE
In to see mom and infant for the first time. Mom was attempting to breastfeed infant on the right breast in the cradle hold with her lying almost flat. Infant would latch and take several sucks and lose the nipple. Tried to assist mom with the cross cradle. It was awkward to mom and she would not continue to hold her breast. I then asked if we could attempt the football hold on the left and infant did latch better and maintain the latch but he would fall asleep. Reviewed the expectations of the first 24 hours and answered mom's questions. Informed mom of the second night of life as well. Lactation consultant to follow up on Wednesday.

## 2018-12-24 NOTE — ANESTHESIA PROCEDURE NOTES
Spinal Block    Start time: 12/24/2018 10:27 AM  End time: 12/24/2018 10:32 AM  Performed by: Edson Hernandez MD  Authorized by: Edson Hernandez MD     Pre-procedure:   Indications: primary anesthetic  Preanesthetic Checklist: patient identified, risks and benefits discussed, anesthesia consent, patient being monitored and timeout performed    Timeout Time: 10:27          Spinal Block:   Patient Position:  Seated  Prep Region:  Lumbar  Prep: chlorhexidine and patient draped      Location:  L3-4  Technique:  Single shot    Local Dose (mL):  3    Needle:   Needle Type:  Pencan  Needle Gauge:  25 G  Attempts:  1      Events: CSF confirmed, no blood with aspiration and no paresthesia        Assessment:  Insertion:  Uncomplicated  Patient tolerance:  Patient tolerated the procedure well with no immediate complications  Transient right paresthesia upon thecal sac entry- immediately resolved

## 2018-12-24 NOTE — ROUTINE PROCESS
SBAR IN Report: Mother    Verbal report received from Andrew Puente RN (full name & credentials) on this patient, who is now being transferred from [facility] (unit) for routine progression of care. The patient is wearing a green \"Anesthesia-Duramorph\" band. Report consisted of patient's Situation, Background, Assessment and Recommendations (SBAR). [place] ID bands were compared with the identification form, and verified with the patient and transferring nurse. Information from the SBAR and the [facility] Report was reviewed with the transferring nurse; opportunity for questions and clarification provided.

## 2018-12-24 NOTE — ROUTINE PROCESS
SBAR OUT Report: Mother    Verbal report given to SUSANNE Henriquez RN (full name & credentials) on this patient, who is now being transferred to [facility] (unit) for routine progression of care. The patient is wearing a green \"Anesthesia-Duramorph\" band. Report consisted of patient's Situation, Background, Assessment and Recommendations (SBAR).  ID bands were compared with the identification form, and verified with the patient and receiving nurse. Information from the SBAR and the [facility] Report was reviewed with the receiving nurse; opportunity for questions and clarification provided.

## 2018-12-24 NOTE — LACTATION NOTE

## 2018-12-24 NOTE — L&D DELIVERY NOTE
Delivery Summary    Patient: Heydi Hightower MRN: [id number]  SSN: [id number]    YOB: 1990  Age: 29 y.o. Sex: female        Information for the patient's :  Joshua Sultana [[id number]]       Labor Events:    Labor: No   Rupture Date: 2018   Rupture Time: 10:58 AM   Rupture Type AROM   Amniotic Fluid Volume: Moderate    Amniotic Fluid Description: Clear None   Induction: None       Augmentation: None   Labor Events: None     Cervical Ripening:     None     Delivery Events:  Episiotomy: None   Laceration(s): None     Repaired: None    Number of Repair Packets:     Suture Type and Size: None     Estimated Blood Loss (ml): 1,000.00ml       Delivery Date: 2018    Delivery Time: 10:59 AM  Delivery Type: , Low Transverse   Details    Trial of Labor: No   Primary/Repeat: Repeat   Priority: Routine   Indications:  Prior Uterine Surgery   Incision type:     Sex:  Male     Gestational Age: 39w0d  Delivery Clinician:  Parker Lopez  Living Status: Living   Delivery Location: OR            APGARS  One minute Five minutes Ten minutes   Skin color: 0   1        Heart rate: 2   2        Grimace: 2   2        Muscle tone: 2   2        Breathin   2        Totals: 8   9          Presentation: Vertex    Position: Left Occiput Anterior  Resuscitation Method:  Suctioning-bulb; Tactile Stimulation     Meconium Stained: None      Cord Information: 3 Vessels  Complications: None  Cord around:    Delayed cord clamping? Yes  Cord clamped date/time:2018 11:00 AM  Disposition of Cord Blood: Lab    Blood Gases Sent?: No    Placenta:  Date/Time: 2018 11:01 AM  Removal: Expressed      Appearance: Normal;Intact     [place] Measurements:  Birth Weight: 7 lb 1.1 oz (3.205 kg)      Birth Length: 49.5 cm      Head Circumference: 33 cm      Chest Circumference: 33 cm     Abdominal Girth:       Other Providers:   Ranulfo Bear J;;GALA RAMIRES;JONNA BRIZUELA;BEBE MORRIS;;;;PRAKASH RODRÍGUEZ;FRANCIS RIVER;ELIANE SOTO, Obstetrician;Primary Nurse;Primary [place] Nurse;Nicu Nurse;Neonatologist;Anesthesiologist;Crna;Nurse Practitioner;Midwife;Nursery Nurse;Scrub Tech;Scrub Tech;Respiratory Therapist             Group B Strep:   Lab Results   Component Value Date/Time    GrBStrep, External Negative 2018     Information for the patient's :  Benoit Calvillo [[id number]]   No results found for: ABORH, PCTABR, PCTDIG, BILI, ABORHEXT, ABORH    No results for input(s): PCO2CB, PO2CB, HCO3I, SO2I, IBD, PTEMPI, SPECTI, PHICB, ISITE, IDEV, IALLEN in the last 72 hours. This was a very difficult case due to obesity, adhesions, poor visualization. Vertical midline skin incision should be considered for next c/s.

## 2018-12-24 NOTE — OP NOTES
Section Delivery Operative Report       Patient: Iveth Wiggins MRN: [id number]  SSN: [id number]    YOB: 1990  Age: 29 y.o. Sex: female       Date of Procedure: 2018     Preoperative Diagnosis: justin 2018 Repeat  Section    Postoperative Diagnosis: 39 weeks Repeat  section with viable male infant    Procedure: LTCS  Section    Surgeon(s):  Gregorio Barbosa MD    Anesthesia:  Spinal    Prophylactic Antibiotics: Ancef    Estimated Blood Loss:  1000cc             Complications:  Maternal obesity, abdominal adhesions, significant bleeding during case, poor exposure due to obesity/adhesions    Procedure Details: The patient was taken to the operating room, where spinal anesthesia was administered and found to be adequate. Shah catheter had been placed using sterile technique. The patient was prepped and draped in the normal sterile fashion. Pfannenstiel skin incision was made with the scalpel, excising the old scar, and carried down to the underlying fascia. The fascial incision was extended laterally with child scissors. The fascia incision was grasped with Kocher clamps, tented up, and the underlying rectus muscles were dissected off with a knife. The scarring here was very dense. The rectus muscle was already widely  in the midline. In the process of scoring the fascia centrally, the peritoneal cavity was entered. Only the uterus was immediately under this - no bowel. The peritoneum defect was extended inferiorly and superiorly with Metzenbaum scissors. Omental adhesions were doubly clamped, cut with bovie, secured with simple ties of 0 vicryl. A thick adhesive band from abdominal wall to fundus was treated in the same fashion. The bladder blade was then inserted. The vesicouterine peritoneum was identified, grasped with pick-ups and entered sharply with Metzenbaum scissors.  The bladder flap was then created digitally and the bladder blade was inserted. A low transverse uterine incision was made with the scalpel and extended laterally with blunt finger dissection. The babys head was then delivered atraumatically. Vacuum was required to aid this due to the small size of the abdominal incision The nose and mouth were suctioned. The cord was clamped and cut and the baby was handed off to the waiting  care unit staff. Placenta was then delivered by expression. The uterus could not be exteriorized. This was attempted x4. It was cleared of all clots and debris and repaired in situ. The uterine incision was closed in 1 layer with running locking 1 Chromic. There was an extension on the right, and significant bleeding all along the hysterotomy. So closure was a quick as possible, rather than 2 layer with imbrication. There was also a lot of bleeding from an area of friable decidual change on the anterior right fundus, near the cornu. 0 chromic mattress suture was placed with partial hemostasis. Due to poor visualization, help was called for to help achieve and ascertain hemostasis. Pt was given TXA IV at this time as well. Dr Tatyana Costa came in to assist. He also sutured the friable area on the fundus with running 2-0 chromic with good results. The pelvis was washed with warm normal saline several times. With each irrigation/suction episode, the irrigant returned clearer. Good hemostasis was reassured throughout. Parietal peritoneum was closed with 2-0 vicryl in a running fashion; at least what could be identified as peritoneum. Several oozing areas behind the bladder flap and under the fascia had already been touched with bovie. Lesley was also instilled in these areas, and over the friable area on the fundus. The  fascia was closed with 1 vicryl in a running fashion. Good hemostasis was assured in the SQ layer. The subcuticular layers were reapproximated with 2-0 plain gut in interrupted fashion.  The skin was closed with a 4-0 Monocryl in a subcuticular fashion. The patient tolerated the procedure well. Sponge, lap, and needle counts were correct times three and the patient and baby were taken to recovery/postpartum room in stable condition.     Signed By: Evan Emery MD     December 24, 2018

## 2018-12-25 LAB
BLOOD BANK CMNT PATIENT-IMP: NORMAL
FETAL SCREEN,FMHS: NORMAL
HCT VFR BLD AUTO: 29.4 % (ref 35.8–46.3)
HGB BLD-MCNC: 9.7 G/DL (ref 11.7–15.4)

## 2018-12-25 PROCEDURE — 85461 HEMOGLOBIN FETAL: CPT

## 2018-12-25 PROCEDURE — 65270000029 HC RM PRIVATE

## 2018-12-25 PROCEDURE — 74011250636 HC RX REV CODE- 250/636: Performed by: OBSTETRICS & GYNECOLOGY

## 2018-12-25 PROCEDURE — 74011250637 HC RX REV CODE- 250/637: Performed by: OBSTETRICS & GYNECOLOGY

## 2018-12-25 PROCEDURE — 85018 HEMOGLOBIN: CPT

## 2018-12-25 PROCEDURE — 74011250636 HC RX REV CODE- 250/636: Performed by: ANESTHESIOLOGY

## 2018-12-25 PROCEDURE — 36415 COLL VENOUS BLD VENIPUNCTURE: CPT

## 2018-12-25 PROCEDURE — 74011250637 HC RX REV CODE- 250/637: Performed by: ANESTHESIOLOGY

## 2018-12-25 RX ORDER — ZOLPIDEM TARTRATE 5 MG/1
5 TABLET ORAL
Status: DISCONTINUED | OUTPATIENT
Start: 2018-12-25 | End: 2018-12-27 | Stop reason: HOSPADM

## 2018-12-25 RX ORDER — HYDROMORPHONE HYDROCHLORIDE 2 MG/ML
1 INJECTION, SOLUTION INTRAMUSCULAR; INTRAVENOUS; SUBCUTANEOUS
Status: DISCONTINUED | OUTPATIENT
Start: 2018-12-25 | End: 2018-12-25

## 2018-12-25 RX ORDER — NALOXONE HYDROCHLORIDE 0.4 MG/ML
0.4 INJECTION, SOLUTION INTRAMUSCULAR; INTRAVENOUS; SUBCUTANEOUS AS NEEDED
Status: DISCONTINUED | OUTPATIENT
Start: 2018-12-25 | End: 2018-12-27 | Stop reason: HOSPADM

## 2018-12-25 RX ORDER — OXYCODONE AND ACETAMINOPHEN 7.5; 325 MG/1; MG/1
2 TABLET ORAL
Status: DISCONTINUED | OUTPATIENT
Start: 2018-12-25 | End: 2018-12-27 | Stop reason: HOSPADM

## 2018-12-25 RX ORDER — DIPHENHYDRAMINE HCL 25 MG
25 CAPSULE ORAL
Status: DISCONTINUED | OUTPATIENT
Start: 2018-12-25 | End: 2018-12-27 | Stop reason: HOSPADM

## 2018-12-25 RX ORDER — ONDANSETRON 2 MG/ML
4 INJECTION INTRAMUSCULAR; INTRAVENOUS
Status: DISCONTINUED | OUTPATIENT
Start: 2018-12-25 | End: 2018-12-27 | Stop reason: HOSPADM

## 2018-12-25 RX ORDER — IBUPROFEN 800 MG/1
800 TABLET ORAL
Status: DISCONTINUED | OUTPATIENT
Start: 2018-12-25 | End: 2018-12-27 | Stop reason: HOSPADM

## 2018-12-25 RX ORDER — HYDROMORPHONE HYDROCHLORIDE 2 MG/ML
2 INJECTION, SOLUTION INTRAMUSCULAR; INTRAVENOUS; SUBCUTANEOUS
Status: COMPLETED | OUTPATIENT
Start: 2018-12-25 | End: 2018-12-25

## 2018-12-25 RX ADMIN — DIPHENHYDRAMINE HYDROCHLORIDE 25 MG: 25 CAPSULE ORAL at 16:18

## 2018-12-25 RX ADMIN — DOCUSATE SODIUM 100 MG: 100 CAPSULE, LIQUID FILLED ORAL at 08:49

## 2018-12-25 RX ADMIN — HYDROMORPHONE HYDROCHLORIDE 2 MG: 2 INJECTION, SOLUTION INTRAMUSCULAR; INTRAVENOUS; SUBCUTANEOUS at 16:15

## 2018-12-25 RX ADMIN — OXYCODONE HYDROCHLORIDE AND ACETAMINOPHEN 2 TABLET: 7.5; 325 TABLET ORAL at 14:32

## 2018-12-25 RX ADMIN — IBUPROFEN 800 MG: 800 TABLET ORAL at 04:42

## 2018-12-25 RX ADMIN — MISOPROSTOL 200 MCG: 200 TABLET ORAL at 04:42

## 2018-12-25 RX ADMIN — ACETAMINOPHEN 1000 MG: 500 TABLET, FILM COATED ORAL at 08:49

## 2018-12-25 RX ADMIN — ACETAMINOPHEN 1000 MG: 500 TABLET, FILM COATED ORAL at 02:59

## 2018-12-25 RX ADMIN — RHO(D) IMMUNE GLOBULIN (HUMAN) 0.3 MG: 1500 SOLUTION INTRAMUSCULAR at 16:25

## 2018-12-25 RX ADMIN — OXYCODONE HYDROCHLORIDE AND ACETAMINOPHEN 2 TABLET: 7.5; 325 TABLET ORAL at 21:00

## 2018-12-25 RX ADMIN — SIMETHICONE CHEW TAB 80 MG 80 MG: 80 TABLET ORAL at 08:49

## 2018-12-25 RX ADMIN — DOCUSATE SODIUM 100 MG: 100 CAPSULE, LIQUID FILLED ORAL at 18:06

## 2018-12-25 RX ADMIN — MISOPROSTOL 200 MCG: 200 TABLET ORAL at 01:06

## 2018-12-25 RX ADMIN — OXYCODONE HYDROCHLORIDE 10 MG: 5 TABLET ORAL at 09:06

## 2018-12-25 RX ADMIN — IBUPROFEN 800 MG: 800 TABLET ORAL at 11:42

## 2018-12-25 RX ADMIN — MISOPROSTOL 200 MCG: 200 TABLET ORAL at 08:49

## 2018-12-25 RX ADMIN — IBUPROFEN 800 MG: 800 TABLET ORAL at 18:05

## 2018-12-25 RX ADMIN — SIMETHICONE CHEW TAB 80 MG 80 MG: 80 TABLET ORAL at 11:42

## 2018-12-25 RX ADMIN — SIMETHICONE CHEW TAB 80 MG 80 MG: 80 TABLET ORAL at 22:07

## 2018-12-25 RX ADMIN — SIMETHICONE CHEW TAB 80 MG 80 MG: 80 TABLET ORAL at 18:06

## 2018-12-25 NOTE — PROGRESS NOTES
Iv infiltrated while flushing prior to toradol administration. Pt requests to leave IV out. Telephone call to ramin Ricketts to leave IV out and may release po motrin order.

## 2018-12-25 NOTE — PROGRESS NOTES
Shah removed. SCDs discontinued. IV converted to saline well and flushed with 10 cc NS. Pt assisted to BR. Melisa care taught. Pt verbalized understanding. Pt tolerated well. Pt ambulating in room.

## 2018-12-25 NOTE — PROGRESS NOTES
Anesthesiology  Post-op Note    Post-op day 1 s/p  via spinal with neuraxial opioids for post-op pain management. Visit Vitals  /49   Pulse 99   Temp 37.1 °C (98.7 °F)   Resp 17   Ht 5' 4\" (1.626 m)   Wt 107.5 kg (237 lb)   LMP 2018   SpO2 97%   Breastfeeding? Yes   BMI 40.68 kg/m²     Airway patent, patient appropriately hydrated and appears euvolemic. Patient is Alert and oriented. Pain is well controlled. Pruritus is moderately controlled. Nausea is absent. No complaints about back or site of injection. Motor and sensory function has returned to baseline in lower extremities. Patient is satisfied with anesthetic and reports no complications. Continue current orders, then initiate surgeon's orders for pain management 24 hours after . Follow up per surgeon.

## 2018-12-25 NOTE — PROGRESS NOTES
Progress Note                               Patient: Cleo Zapien MRN: [id number]  SSN: [id number]    YOB: 1990  Age: 29 y.o. Sex: female      1 Day Post-Op     Subjective:     Patient doing well postpartum without significant complaints. Voiding without difficulty. Patient reports normal lochia. .    Objective:     Patient Vitals for the past 18 hrs:   Temp Pulse Resp BP SpO2   18 0725 98.7 °F (37.1 °C) (!) 106 18 109/60 97 %   18 0405 98.7 °F (37.1 °C) 99 17 107/49 97 %   18 0020 98.2 °F (36.8 °C) 96 17 112/53    18 2000 98.5 °F (36.9 °C) 96 16 101/56 96 %       Temp (24hrs), Av.2 °F (36.8 °C), Min:97.5 °F (36.4 °C), Max:98.7 °F (37.1 °C)      Physical Exam:    General:   Patient without distress. Abdomen: Soft, fundus firm at level of umbilicus, nontender   Incision: Clean, dry, and intact without erythema   Lower Extremities: Negative for swelling, cords, or tenderness        Lab/Data Review:  CBC:    Recent Labs     18  0624 18  0816   WBC  --  9.7   HGB 9.7* 12.8   HCT 29.4* 38.4   PLT  --  164       Assessment and Plan:     Patient appears to be having an uncomplicated post- course. Continue routine postoperative care and maternal education.     Signed By: Jhonatan Colin MD     2018

## 2018-12-26 PROCEDURE — 74011250637 HC RX REV CODE- 250/637: Performed by: OBSTETRICS & GYNECOLOGY

## 2018-12-26 PROCEDURE — 65270000029 HC RM PRIVATE

## 2018-12-26 RX ORDER — POLYETHYLENE GLYCOL 3350 17 G/17G
17 POWDER, FOR SOLUTION ORAL DAILY
Status: DISCONTINUED | OUTPATIENT
Start: 2018-12-27 | End: 2018-12-27 | Stop reason: HOSPADM

## 2018-12-26 RX ORDER — ONDANSETRON 8 MG/1
8 TABLET, ORALLY DISINTEGRATING ORAL
Status: DISCONTINUED | OUTPATIENT
Start: 2018-12-26 | End: 2018-12-27 | Stop reason: HOSPADM

## 2018-12-26 RX ADMIN — OXYCODONE HYDROCHLORIDE AND ACETAMINOPHEN 2 TABLET: 7.5; 325 TABLET ORAL at 15:10

## 2018-12-26 RX ADMIN — OXYCODONE HYDROCHLORIDE AND ACETAMINOPHEN 2 TABLET: 7.5; 325 TABLET ORAL at 23:11

## 2018-12-26 RX ADMIN — DOCUSATE SODIUM 100 MG: 100 CAPSULE, LIQUID FILLED ORAL at 08:15

## 2018-12-26 RX ADMIN — IBUPROFEN 800 MG: 800 TABLET ORAL at 11:35

## 2018-12-26 RX ADMIN — SIMETHICONE CHEW TAB 80 MG 80 MG: 80 TABLET ORAL at 23:11

## 2018-12-26 RX ADMIN — DOCUSATE SODIUM 100 MG: 100 CAPSULE, LIQUID FILLED ORAL at 17:55

## 2018-12-26 RX ADMIN — SIMETHICONE CHEW TAB 80 MG 80 MG: 80 TABLET ORAL at 08:15

## 2018-12-26 RX ADMIN — SIMETHICONE CHEW TAB 80 MG 80 MG: 80 TABLET ORAL at 11:35

## 2018-12-26 RX ADMIN — SIMETHICONE CHEW TAB 80 MG 80 MG: 80 TABLET ORAL at 17:55

## 2018-12-26 RX ADMIN — IBUPROFEN 800 MG: 800 TABLET ORAL at 00:05

## 2018-12-26 RX ADMIN — IBUPROFEN 800 MG: 800 TABLET ORAL at 06:05

## 2018-12-26 RX ADMIN — OXYCODONE HYDROCHLORIDE AND ACETAMINOPHEN 2 TABLET: 7.5; 325 TABLET ORAL at 03:17

## 2018-12-26 RX ADMIN — IBUPROFEN 800 MG: 800 TABLET ORAL at 17:55

## 2018-12-26 RX ADMIN — OXYCODONE HYDROCHLORIDE AND ACETAMINOPHEN 2 TABLET: 7.5; 325 TABLET ORAL at 09:13

## 2018-12-26 RX ADMIN — ONDANSETRON 8 MG: 8 TABLET, ORALLY DISINTEGRATING ORAL at 08:37

## 2018-12-26 NOTE — LACTATION NOTE
This note was copied from a baby's chart. Upon entering room at 0300 mother was attempting to breastfeeding, this RN noticed that infant did not have a proper latch. Educated mother on un-swaddling infant with feeding to help infant be more alert. Mother voiced understanding. This RN then un-swaddled infant, infant became more interested in feeding, infant rooting at the breast. After 15min of this RN attempting to get infant to latch mother states she wants to supplement with formula for this feeding. Encouraged mother to keep trying to breastfeeding with feedings and to go ahead an pump at this time. Mother given Similac formula with slow flow nipples. Educated mother on how to prep bottles and to discard after one hour of opening. This RN offered to feed infant, mother declines and wants to feed infant herself. Mother to call for needs/concerns. Wet diaper changed prior to bottle feeding.

## 2018-12-26 NOTE — LACTATION NOTE
Pt in bed pumping, pt states she has been pumping for 5 minutes no colostrum seen, pt continuing to pump upon this RN leaving the room. . Call light within reach.

## 2018-12-26 NOTE — PROGRESS NOTES
Progress Note                               Patient: Ludwin Oshea MRN: [id number]  SSN: [id number]    YOB: 1990  Age: 29 y.o. Sex: female      2 Days Post-Op     Subjective:     Patient doing well postpartum. Voiding without difficulty. Patient reports normal lochia. . Breastfeeding: Yes pain control is better today. No BM yet and no flatus yet. Objective:     Patient Vitals for the past 18 hrs:   Temp Pulse Resp BP SpO2   18 1103 97.6 °F (36.4 °C) 83 18 129/71 96 %   18 0730 98.2 °F (36.8 °C) 72 18 107/60 96 %   18 0322 97.6 °F (36.4 °C) 93 18 117/68    18 2325 98.1 °F (36.7 °C) 84 18 101/55 98 %   18 1910 98.5 °F (36.9 °C) 96 16 108/67 98 %       Temp (24hrs), Av °F (36.7 °C), Min:97.6 °F (36.4 °C), Max:98.5 °F (36.9 °C)      Physical Exam:    General:   Patient without distress. Abdomen: Soft, fundus firm at level of umbilicus, nontender   Incision: Clean, dry, and intact without erythema   Lower Extremities: Negative for swelling, cords, or tenderness        Lab/Data Review:  CBC:    Recent Labs     18  0624 18  0816   WBC  --  9.7   HGB 9.7* 12.8   HCT 29.4* 38.4   PLT  --  164     Blood Type:   Lab Results   Component Value Date/Time    ABO/Rh(D) O NEGATIVE 2018 08:16 AM    ABO,Rh O Negative 2018      Infant's Blood Type: Information for the patient's :  Ale Vides [[id number]]     Lab Results   Component Value Date/Time    ABO/Rh(D) O POSITIVE 2018 10:59 AM       Assessment and Plan: Add miralax for constipation. Increase activity.      Signed By: Natalie Lassiter MD     2018

## 2018-12-26 NOTE — PROGRESS NOTES
Report received from Gayle Hamilton RN care assumed. Pt sitting up in bed with call light in reach. No sign/symptoms of distress noted.

## 2018-12-26 NOTE — PROGRESS NOTES
provided education and pamphlet on [facility].  Family was undecided on need for home visit. No referral will be made at this time. Family has this 's contact information should they decide to participate in program.      Patient shared that she lost a baby earlier this year that lived for only 24 days. Per patient, she experienced \"normal grief\" related depression/anxiety. Patient has been under the care of a psychiatrist and therapist (currently no meds). Patient states that she has a strong support system consisting of her mother and other family members. Education/literature provided on how to apply for Medicaid & WIC.  provided informational packet on  mood disorder education/resources. Family receptive to receiving information and denied any additional needs from . Family has this 's contact information should any needs/questions arise.     Jessica De La Rosa, [address] [facility]

## 2018-12-26 NOTE — LACTATION NOTE
This note was copied from a baby's chart. Mother states infant did not latch with last feeding. However mother did not call for assistance either. Mother states she is not getting anything when she pumps either. Mother states she has been pumping so long that the machine turns off by itself. Pt states she falls asleep with pumping. Mother is not wanting to use formula at this time. Mother going to attempt to breastfeed. Infant alert and quiet at this time.

## 2018-12-26 NOTE — LACTATION NOTE
This note was copied from a baby's chart. Mom called out as ready to feed baby. Positioned mom in football and got infant skin to skin with her. Attempted w/ mom for 15 minutes on this side, infant would place mouth around breast well, but would not attempt much sucking. Infant appears to have stuffy nose also. Mom states she put suction bulb up his nose prior to try to clear. Reviewed with mom this could make his nasal passages more swollen sometimes, encouraged her to just try to get extra secretions out of his mouth w/ bulb syringe. After baby tried at breast with no interest, gma gave mom a bottle of formula per mom's choice, while mom pumped. Mom encouraged to pump after any poor/fair feeds or attempts to help bring milk supply in and give back expressed breast milk. Can supplement after per mom's choice. Mom states her 1st baby was in NICU for 24 day (Passed away after this due to multiple anomolies) but she pumped with this baby as well. Mom has no further needs or questions at this time. Lactation to follow up in am for discharge.

## 2018-12-26 NOTE — PROGRESS NOTES
Shift assessment complete see flowsheet. Discussed tonight plan of care with pt. Encouraged pt to keep track of I&O's. Pt states \"I haven't had BP issues in over 3 years\". Pt states she was never on BP meds. Encouraged pt to shower tonight and use Chlorhexidine wash. Pt voiced understanding. Pt to call for needs/concerns. Questions encouraged and answered. Pt in bed with call light in reach.

## 2018-12-26 NOTE — LACTATION NOTE
This note was copied from a baby's chart. Infant sleeping at this time. Mother states she has not attempted to feed infant since approximately 200. Mother just got out of shower. Encouraged mother to unwrap infant within the next 30 minutes and attempt to feed and to call for assistance if she is unable to get infant latched. Mother voiced understanding.

## 2018-12-26 NOTE — LACTATION NOTE
This note was copied from a baby's chart. In to see mom and baby for follow up. Per mom, infant latched in beginning some, but has not been interested or sucking at breast since. Mom taught how to pump, but only pumped x 1. Started supplementing per choice. Mom states no milk seen from pumping yet. Reviewed normal pumping volumes for first week of life and about supply and demand in bringing it in.  Mom to call out for next feed to help with latching, etc.

## 2018-12-27 VITALS
DIASTOLIC BLOOD PRESSURE: 78 MMHG | OXYGEN SATURATION: 99 % | HEIGHT: 64 IN | BODY MASS INDEX: 40.46 KG/M2 | SYSTOLIC BLOOD PRESSURE: 133 MMHG | RESPIRATION RATE: 16 BRPM | TEMPERATURE: 97.9 F | WEIGHT: 237 LBS | HEART RATE: 99 BPM

## 2018-12-27 PROCEDURE — 74011000250 HC RX REV CODE- 250: Performed by: OBSTETRICS & GYNECOLOGY

## 2018-12-27 PROCEDURE — 74011250637 HC RX REV CODE- 250/637: Performed by: OBSTETRICS & GYNECOLOGY

## 2018-12-27 RX ORDER — IBUPROFEN 800 MG/1
800 TABLET ORAL
Qty: 60 TAB | Refills: 0 | Status: SHIPPED | OUTPATIENT
Start: 2018-12-27 | End: 2020-01-09 | Stop reason: ALTCHOICE

## 2018-12-27 RX ORDER — OXYCODONE AND ACETAMINOPHEN 5; 325 MG/1; MG/1
1-2 TABLET ORAL
Qty: 20 TAB | Refills: 0 | Status: SHIPPED | OUTPATIENT
Start: 2018-12-27 | End: 2019-01-10 | Stop reason: ALTCHOICE

## 2018-12-27 RX ADMIN — DOCUSATE SODIUM 100 MG: 100 CAPSULE, LIQUID FILLED ORAL at 09:56

## 2018-12-27 RX ADMIN — SIMETHICONE CHEW TAB 80 MG 80 MG: 80 TABLET ORAL at 09:57

## 2018-12-27 RX ADMIN — IBUPROFEN 800 MG: 800 TABLET ORAL at 04:51

## 2018-12-27 RX ADMIN — OXYCODONE HYDROCHLORIDE AND ACETAMINOPHEN 1 TABLET: 7.5; 325 TABLET ORAL at 09:57

## 2018-12-27 RX ADMIN — POLYETHYLENE GLYCOL (3350) 17 G: 17 POWDER, FOR SOLUTION ORAL at 09:57

## 2018-12-27 NOTE — PROGRESS NOTES
Progress Note                               Patient: Maral Chavez MRN: [id number]  SSN: [id number]    YOB: 1990  Age: 29 y.o. Sex: female      3 Days Post-Op     Subjective:     Patient doing well postpartum without significant complaints. Voiding without difficulty. Patient reports normal lochia. .     Objective:     Patient Vitals for the past 18 hrs:   Temp Pulse Resp BP SpO2   18 0713 98.2 °F (36.8 °C) 99 17 138/79 96 %   18 0322 98.6 °F (37 °C) 85 17 104/56 97 %   18 2320  94 18 129/72    18 1923 98.2 °F (36.8 °C) 90 17 122/72 97 %       Temp (24hrs), Av.2 °F (36.8 °C), Min:97.6 °F (36.4 °C), Max:98.6 °F (37 °C)      Physical Exam:    General:   Patient without distress. Abdomen: Soft, fundus firm at level of umbilicus, nontender   Incision: Clean, dry, and intact without erythema   Lower Extremities: Negative for swelling, cords, or tenderness        Lab/Data Review: All lab results for the last 24 hours reviewed. Blood Type:   Lab Results   Component Value Date/Time    ABO/Rh(D) O NEGATIVE 2018 08:16 AM    ABO,Rh O Negative 2018      Infant's Blood Type: Information for the patient's :  Mei Fortune [[id number]]     Lab Results   Component Value Date/Time    ABO/Rh(D) O POSITIVE 2018 10:59 AM     Fetal Screen:   Lab Results   Component Value Date/Time    Fetal screen NEG 2018 06:24 AM    Kleihauer-Betke: No results found for: EKLB  Rhogam given     Assessment and Plan:     Patient appears to be having an uncomplicated post- course. Continue routine postoperative care and maternal education. and Will discharge home today.     Signed By: Jaime Avendaño MD     2018

## 2018-12-27 NOTE — DISCHARGE INSTRUCTIONS
Section: What to Expect at [address] [place] [address]    A  section, or , is surgery to deliver your baby through a cut, called an incision, that the doctor makes in your lower belly and uterus. You may have some pain in your lower belly and need pain medicine for 1 to 2 weeks. You can expect some vaginal bleeding for several weeks. You will probably need about 6 weeks to fully recover. It is important to take it easy while the incision is healing. Avoid heavy lifting, strenuous activities, or exercises that strain the belly muscles while you are recovering. Ask a family member or friend for help with housework, cooking, and shopping. This care sheet gives you a general idea about how long it will take for you to recover. But each person recovers at a different pace. Follow the steps below to get better as quickly as possible. How can you care for yourself at home? Activity    · Rest when you feel tired. Getting enough sleep will help you recover.     · Try to walk each day. Start by walking a little more than you did the day before. Bit by bit, increase the amount you walk. Walking boosts blood flow and helps prevent pneumonia, constipation, and blood clots.     · Avoid strenuous activities, such as bicycle riding, jogging, weightlifting, and aerobic exercise, for 6 weeks or until your doctor says it is okay.     · Until your doctor says it is okay, do not lift anything heavier than your baby.     · Do not do sit-ups or other exercises that strain the belly muscles for 6 weeks or until your doctor says it is okay.     · Hold a pillow over your incision when you cough or take deep breaths. This will support your belly and decrease your pain.     · You may shower as usual. Pat the incision dry when you are done.     · You will have some vaginal bleeding. Wear sanitary pads.  Do not douche or use tampons until your doctor says it is okay.     · Ask your doctor when you can drive again.     · You will probably need to take at least 6 weeks off work. It depends on the type of work you do and how you feel.     · Ask your doctor when it is okay for you to have sex. Diet    · You can eat your normal diet. If your stomach is upset, try bland, low-fat foods like plain rice, broiled chicken, toast, and yogurt.     · Drink plenty of fluids (unless your doctor tells you not to).     · You may notice that your bowel movements are not regular right after your surgery. This is common. Try to avoid constipation and straining with bowel movements. You may want to take a fiber supplement every day. If you have not had a bowel movement after a couple of days, ask your doctor about taking a mild laxative.     · If you are breastfeeding, limit alcohol. Alcohol can cause a lack of energy and other health problems for the baby when a breastfeeding woman drinks heavily. It can also get in the way of a mom's ability to feed her baby or to care for the child in other ways. There isn't a lot of research about exactly how much alcohol can harm a baby. Having no alcohol is the safest choice for your baby. If you choose to have a drink now and then, have only one drink, and limit the number of occasions that you have a drink. Wait to breastfeed at least 2 hours after you have a drink to reduce the amount of alcohol the baby may get in the milk. Medicines    · Your doctor will tell you if and when you can restart your medicines. He or she will also give you instructions about taking any new medicines.     · If you take blood thinners, such as warfarin (Coumadin), clopidogrel (Plavix), or aspirin, be sure to talk to your doctor. He or she will tell you if and when to start taking those medicines again. Make sure that you understand exactly what your doctor wants you to do.     · Take pain medicines exactly as directed. ? If the doctor gave you a prescription medicine for pain, take it as prescribed.   ? If you are not taking a prescription pain medicine, ask your doctor if you can take an over-the-counter medicine.     · If you think your pain medicine is making you sick to your stomach:  ? Take your medicine after meals (unless your doctor has told you not to). ? Ask your doctor for a different pain medicine.     · If your doctor prescribed antibiotics, take them as directed. Do not stop taking them just because you feel better. You need to take the full course of antibiotics. Incision care    · If you have strips of tape on the incision, leave the tape on for a week or until it falls off.     · Wash the area daily with warm, soapy water, and pat it dry. Don't use hydrogen peroxide or alcohol, which can slow healing. You may cover the area with a gauze bandage if it weeps or rubs against clothing. Change the bandage every day.     · Keep the area clean and dry. Other instructions    · If you breastfeed your baby, you may be more comfortable while you are healing if you place the baby so that he or she is not resting on your belly. Try tucking your baby under your arm, with his or her body along the side you will be feeding on. Support your baby's upper body with your arm. With that hand you can control your baby's head to bring his or her mouth to your breast. This is sometimes called the football hold. Follow-up care is a key part of your treatment and safety. Be sure to make and go to all appointments, and call your doctor if you are having problems. It's also a good idea to know your test results and keep a list of the medicines you take. When should you call for help? Call 911 anytime you think you may need emergency care.  For example, call if:    · You passed out (lost consciousness).     · You have chest pain, are short of breath, or cough up blood.    Call your doctor now or seek immediate medical care if:    · You have pain that does not get better after you take pain medicine.     · You have severe vaginal bleeding.     · You are dizzy or lightheaded, or you feel like you may faint.     · You have new or worse pain in your belly or pelvis.     · You have loose stitches, or your incision comes open.     · You have symptoms of infection, such as:  ? Increased pain, swelling, warmth, or redness. ? Red streaks leading from the incision. ? Pus draining from the incision. ? A fever.     · You have symptoms of a blood clot in your leg (called a deep vein thrombosis), such as:  ? Pain in your calf, back of the knee, thigh, or groin. ? Redness and swelling in your leg or groin.    Watch closely for changes in your health, and be sure to contact your doctor if:    · You do not get better as expected. Where can you learn more? Go to http://matthew-franchesca.info/. Enter M806 in the search box to learn more about \" Section: What to Expect at Home. \"  Current as of: 2017  Content Version: 11.8  © 8053-6974 [employer]. Care instructions adapted under license by [employer] (which disclaims liability or warranty for this information). If you have questions about a medical condition or this instruction, always ask your healthcare professional. [address] any warranty or liability for your use of this information.

## 2018-12-27 NOTE — DISCHARGE SUMMARY
Obstetrical Discharge Summary     Name: Heydi Hightower MRN: [id number]  SSN: [id number]    YOB: 1990  Age: 29 y.o. Sex: female      Allergies: Latex, natural rubber and Dilaudid [hydromorphone]    Admit Date: 2018    Discharge Date: 2018     Admitting Physician: Parker Lopez MD     Attending Physician:  Diana Durbin MD     * Admission Diagnoses: justin 2018 Repeat  Section;Previous  section    * Discharge Diagnoses:   Information for the patient's :  Lewis Kayy [[id number]]   Delivery of a 7 lb 1.1 oz (3.205 kg) male infant via , Low Transverse on 2018 at 10:59 AM  by . Apgars were 8 and 9. Additional Diagnoses:   Hospital Problems as of 2018 Date Reviewed: 2018          Codes Class Noted - Resolved POA    * (Principal) Previous  section ICD-10-CM: W30.524  ICD-9-CM: V45.89  2018 - Present Yes        Delivery of pregnancy by  section ICD-10-CM: O82  ICD-9-CM: 669.70  2018 - Present No    Overview Signed 2018 12:37 PM by Diana Durbin MD     Very difficult visualization due to obesity/adhesions. Consider vertical midline skin incision if has another c/s.               Short interval between pregnancies affecting pregnancy, antepartum ICD-10-CM: O09.899  ICD-9-CM: V23.89  2018 - Present Yes    Overview Addendum 2018  3:15 PM by Drake Chacko MD     g1 c/s on 18  LMP 3/26/18  Increase Vit C., Vit D   Watch for IUGR    Discussed would NOT recommend TOLAC given such short IC period              Rh negative status during pregnancy in third trimester ICD-10-CM: O09.893, Z67.91  ICD-9-CM: V23.89  2017 - Present Yes    Overview Addendum 2018  7:37 AM by Drake Chacko MD     FOB Rh pos  Intake ab screen +Anti D (too weak to titer)--likely still elevated from Rhogam after very recent c/s from last baby in 2018 as it covers 30mL for 12 weeks  repeat Ab screen in 4wks to ensure titer not rising:  NEG (18) at 12wks       Rhogam 28wks ____ , repeat ab screen  ____              Obesity affecting pregnancy in third trimester ICD-10-CM: O99.213  ICD-9-CM: 649.13  2017 - Present Yes    Overview Addendum 2018 11:15 AM by Avani Murray MD     Early glucola 72, repeat 28wks    2018 at [facility]:  Pt with BMI of 35.   10/29/2018 at [facility]:  Glucola 1hr screening done 10/8/2018:152;   Glucola 3hr screening done 10/12/2018 : 73, 152, 116, 113             Pre-existing essential hypertension complicating pregnancy in third trimester ICD-10-CM: O10.013  ICD-9-CM: 642.03  2017 - Present Yes    Overview Addendum 2018 12:25 PM by Avani Murray MD      (dx ) in ER w/ Coello Marus range bps\" per pt report  Previously on antihypertensive but only took for  <1yr (self DC'd) \"I dont' like to take a lot of meds\"   Bps normotensive on intake  Did not develop PEDRO w/ G1    Baseline HELLP labs wnl (9wks) except Cr 0.94   Baseline 24hr urine 114 (18 at 12wks)   TSH wnl  baseline EKG wnl     · Recommend adding or changing medication prn to keep SBP < 150, DBP < 105. · For increase in BP/symptoms, repeat preeclamptic labs in OB office:  (CBC, CMP, LDH, Uric Acid); also do P/C ratio (if elevated, need to then confirm with 24h urine) or 24 hour urine (for total protein and creatinine clearance).                      Lab Results   Component Value Date/Time    ABO/Rh(D) O NEGATIVE 2018 08:16 AM    Rubella, External Immune 2018    GrBStrep, External Negative 2018    ABO,Rh O Negative 2018      Immunization History   Administered Date(s) Administered    Rho(D) Immune Globulin - IM 2017, 2018       * Procedures: CS  Procedure(s) with comments:   SECTION - justin 2018 Repeat  Section      Jemal Sunil  Depression Scale  I have been able to laugh and see the funny side of things: As much as I always could  I have looked forward with enjoyment to things: As much as I ever did  I have blamed myself unnecessarily when things went wrong: Not very often  I have been anxious or worried for no good reason: Yes, sometimes  I have felt scared or panicky for no very good reason: No, not much  Things have been getting on top of me: No, most of the time I have coped quite well  I have been so unhappy that I have had difficulty sleeping: Not very often  I have felt sad or miserable: Not very often  I have been so unhappy that I have been crying: No, never  The thought of harming myself has occurred to me: Never  Total Score: 7    * Discharge Condition: good    * Hospital Course: Normal hospital course following the delivery. * Disposition: Home    Discharge Medications:   Current Discharge Medication List      START taking these medications    Details   ibuprofen (MOTRIN) 800 mg tablet Take 1 Tab by mouth every eight (8) hours as needed for Pain. Qty: 60 Tab, Refills: 0    Associated Diagnoses: Postoperative pain      oxyCODONE-acetaminophen (PERCOCET) 5-325 mg per tablet Take 1-2 Tabs by mouth every four (4) hours as needed for Pain. Max Daily Amount: 12 Tabs. Qty: 20 Tab, Refills: 0    Associated Diagnoses: Postoperative pain         CONTINUE these medications which have NOT CHANGED    Details   -iron-folate 1-dss-dha (VITAFOL FE+, WITH DOCUSATE,) 90 mg iron-1 mg -50 mg-200 mg cap Take 1 Tab by mouth daily. Qty: 90 Cap, Refills: 4      ascorbic acid, vitamin C, (VITAMIN C) 500 mg tablet Take 1 Tab by mouth two (2) times a day. Qty: 60 Tab, Refills: 2      diphenhydrAMINE (BENADRYL) 25 mg capsule Take 25 mg by mouth every six (6) hours as needed. cholecalciferol, vitamin D3, (VITAMIN D3) 2,000 unit tab Take  by mouth. raNITIdine (ZANTAC) 150 mg tablet Take 1 Tab by mouth two (2) times a day.   Qty: 60 Tab, Refills: 5      docusate sodium (COLACE) 50 mg capsule Take 50 mg by mouth two (2) times a day. ferrous sulfate (IRON) 325 mg (65 mg iron) EC tablet Take 1 Tab by mouth two (2) times a day. Qty: 60 Tab, Refills: 3         STOP taking these medications       acetaminophen (TYLENOL EXTRA STRENGTH) 500 mg tablet Comments:   Reason for Stopping:               * Follow-up Care/Patient Instructions:   Activity: No lifting, Driving, or Strenuous exercise for 2-3 week and No sex for 6 weeks  Diet: Regular Diet  Wound Care: Keep wound clean and dry    Follow-up Information     Follow up With Specialties Details Why Contact Info    None    None ([phone]) Patient stated that they have no PCP             Signed By:  Ren Gutierrez MD     December 27, 2018

## 2018-12-27 NOTE — LACTATION NOTE
Mom and baby are going home today. Continue to offer the breast without restriction. Mom's milk should be fully in over the next few days. Reviewed engorgement precautions. Hand Expression has been demoed and written hand-out reviewed. As milk comes in baby will be more alert at the breast and swallows will be more obvious. Breasts may feel softer once baby has finished nursing. Baby should be back to birth weight by 3weeks of age. And then gain on average 1 oz per day for the next 2-3 months. Reviewed babies should be exclusively breastfeeding for the first 6 months and that breastfeeding should continue after introduction of appropriate complimentary foods after 6 months. Initial output should be at least 1 wet and 1 bowel movement for each day old baby is. By day 5-7 once milk is fully in baby will consistently have 6 or more soaking wet diapers and about 4 bowel movement. Some babies have a bowel movement with every feeding and some have 1-3 large bowel movements each day. Inadequate output may indicate inadequate feedings and should be reported to your Pediatrician. Bowel habits may change as baby gets older. Encouraged follow-up at Pediatrician in 1-2 days, no later than 1 week of age. Call [facility] for any questions as needed or to set up an OP visit. OP phone calls are returned within 24 hours. Community Breastfeeding Resource List given. Exclusively bottle feeding. Has only pumped x 1 in past 24 hours. States \"I am not pumping because I have no milk\". Reviewed supply and demand. Discussed pumping consistency to help increase milk volume. Mom has a double pump for home use.

## 2019-01-10 PROBLEM — E66.01 OBESITY, MORBID (HCC): Status: ACTIVE | Noted: 2019-01-10

## 2019-02-05 NOTE — THERAPY DISCHARGE
Fredis Dawkins  : 1990  Primary: Vikram Rain  Secondary:  [facility] [address],  [place].  [phone]   [phone]       OUTPATIENT PHYSICAL THERAPY:Discontinuation Summary 2018   ICD-10: Treatment Diagnosis: Low back pain (M54.5)  Precautions/Allergies:   Latex, natural rubber and Dilaudid [hydromorphone]   MD Orders: referral to physical therapy MEDICAL/REFERRING DIAGNOSIS:  Supervision of high risk pregnancy, unspecified, second trimester [O09.92]   DATE OF ONSET: 2 months ago   REFERRING PHYSICIAN: Ricardo French MD     Jordan Tyson has been seen in physical therapy from 18 to 18 for 2 visits. Treatment has been discontinued at this time due to patient having a scheduled . .  The below goals were met prior to discontinuation. Some goals were not met due to patient only able to attend 2 physical therapy sessions. Thank you for this referral.            PLAN:  Discontinue physical therapy at this time. Recommend to re-start physical therapy for core strengthening and  education after she has baby and is cleared for exercise. Thank you for this referral,  Margarita Gonzalez, PT      GOALS: (Goals have been discussed and agreed upon with patient.)    Discharge Goals: Time Frame: 2-4 visits  1. She will be independent with HEP. GOAL MET  2. She will demonstrate body mechanics for daily activities to decrease stress on low back.  ONGOING  Rehabilitation Potential For Stated Goals: Fair

## 2020-06-23 PROBLEM — Z98.891 PREVIOUS CESAREAN SECTION: Status: RESOLVED | Noted: 2018-12-24 | Resolved: 2020-06-23

## 2022-03-18 PROBLEM — O09.93 SUPERVISION OF HIGH RISK PREGNANCY IN THIRD TRIMESTER: Status: ACTIVE | Noted: 2017-08-15

## 2022-03-19 PROBLEM — O34.219 HISTORY OF CESAREAN SECTION COMPLICATING PREGNANCY: Status: ACTIVE | Noted: 2018-05-28

## 2022-03-19 PROBLEM — O26.893 RH NEGATIVE STATUS DURING PREGNANCY IN THIRD TRIMESTER: Status: ACTIVE | Noted: 2017-07-14

## 2022-03-19 PROBLEM — O09.899 SHORT INTERVAL BETWEEN PREGNANCIES AFFECTING PREGNANCY, ANTEPARTUM: Status: ACTIVE | Noted: 2018-05-29

## 2022-03-19 PROBLEM — Z67.91 RH NEGATIVE STATUS DURING PREGNANCY IN THIRD TRIMESTER: Status: ACTIVE | Noted: 2017-07-14

## 2022-03-19 PROBLEM — O99.213 OBESITY AFFECTING PREGNANCY IN THIRD TRIMESTER: Status: ACTIVE | Noted: 2017-07-11

## 2022-03-19 PROBLEM — E66.01 OBESITY, MORBID (HCC): Status: ACTIVE | Noted: 2019-01-10

## 2022-03-19 PROBLEM — O09.299 HISTORY OF FETAL ANOMALY IN PRIOR PREGNANCY, CURRENTLY PREGNANT: Status: ACTIVE | Noted: 2018-05-28

## 2022-03-20 PROBLEM — O09.299 HISTORY OF NEURAL TUBE DEFECT IN INFANT IN PRIOR PREGNANCY, CURRENTLY PREGNANT: Status: ACTIVE | Noted: 2018-05-28

## 2022-03-20 PROBLEM — O10.013 PRE-EXISTING ESSENTIAL HYPERTENSION COMPLICATING PREGNANCY IN THIRD TRIMESTER: Status: ACTIVE | Noted: 2017-07-11

## 2022-06-02 NOTE — PROGRESS NOTES
Hospitalist Progress Note      PCP: HERIBERTO Dan - CNP    Date of Admission: 5/29/2022      Subjective: Feels okay no abdominal pain. Denies fever chills no chest pain. Nurse at bedside      Medications:  Reviewed    Infusion Medications    dextrose      sodium chloride Stopped (06/01/22 0805)    sodium chloride 100 mL/hr at 06/01/22 1906     Scheduled Medications    meropenem  500 mg IntraVENous Q6H    ferrous sulfate  324 mg Oral Every Other Day    insulin glargine  18 Units SubCUTAneous Nightly    rosuvastatin  5 mg Oral Nightly    gabapentin  300 mg Oral QAM    And    gabapentin  300 mg Oral Lunch    And    gabapentin  300 mg Oral QPM    And    gabapentin  900 mg Oral Nightly    insulin lispro  0-12 Units SubCUTAneous TID WC    insulin lispro  0-6 Units SubCUTAneous Nightly    sodium chloride flush  10 mL IntraVENous 2 times per day     PRN Meds: traMADol, oxyCODONE, glucose, dextrose bolus **OR** dextrose bolus, glucagon (rDNA), dextrose, sodium chloride flush, sodium chloride, ondansetron, polyethylene glycol, acetaminophen **OR** acetaminophen      Intake/Output Summary (Last 24 hours) at 6/2/2022 0828  Last data filed at 6/1/2022 2024  Gross per 24 hour   Intake 4307.76 ml   Output --   Net 4307.76 ml       Physical Exam Performed:    /71   Pulse 88   Temp 98.4 °F (36.9 °C) (Oral)   Resp 16   Ht 5' 1\" (1.549 m)   Wt 130 lb 1.1 oz (59 kg)   SpO2 94%   BMI 24.58 kg/m²     General appearance: No apparent distress  Neck: Supple  Respiratory:  Normal respiratory effort. Clear to auscultation, bilaterally without Rales/Wheezes/Rhonchi. Cardiovascular: Regular rate and rhythm with normal S1/S2 without murmurs, rubs or gallops. Abdomen: Soft, non-tender, non-distended with normal bowel sounds. Musculoskeletal: No clubbing, cyanosis   Skin: Skin color, texture, turgor normal.  No rashes or lesions.   Neurologic: Moving all extremities spontaneously  Psychiatric: Shift assessment completed. Melisa care done. Plan of care reviewed with pt. Alert and oriented  Capillary Refill: Brisk,3 seconds, normal   Peripheral Pulses: +2 palpable, equal bilaterally       Labs:   Recent Labs     05/31/22 0431 06/01/22  0501 06/02/22  0509   WBC 21.8* 19.4* 22.4*   HGB 8.5* 9.1* 8.5*   HCT 27.6* 29.4* 27.0*    332 318     Recent Labs     05/31/22 0431 06/01/22  0501 06/02/22  0509    136 137   K 3.1* 3.8 3.6    104 105   CO2 25 22 24   BUN 11 10 8   CREATININE 0.5* <0.5* <0.5*   CALCIUM 10.4 9.7 8.4     Recent Labs     05/31/22 0431 06/01/22  0501 06/02/22  0509   AST 39* 26 17   ALT 42* 37 28   BILITOT <0.2 <0.2 <0.2   ALKPHOS 80 82 78     No results for input(s): INR in the last 72 hours. No results for input(s): [place] in the last 72 hours. Urinalysis:      Lab Results   Component Value Date    NITRU Negative 05/29/2022    WBCUA 289 05/29/2022    BACTERIA 4+ 05/29/2022    RBCUA 169 05/29/2022    BLOODU LARGE 05/29/2022    SPECGRAV 1.026 05/29/2022    GLUCOSEU Negative 05/29/2022       Radiology:  US THYROID   Final Result   Multiple thyroid nodules. There is a very hypoechoic peripherally calcified   TR 5 15 mm right thyroid nodule which meets criteria for FNA. Additional TR 4 nodule in the right thyroid meets criteria for follow-up but   not biopsy. TR 3 nodules in the left thyroid do not meet criteria for follow-up or biopsy. See recommendations below.       RECOMMENDATIONS:   ACR TI-RADS recommendations:      TR5 (>= 7 points):  FNA if >= 1 cm; follow-up if 0.5-0.9 cm in 1, 2, 3, 4,   and 5 years      TR4 (4-6 points):  FNA if >= 1.5 cm; follow-up if 1.0-1.4 cm in 1, 2, 3, and   5 years      TR3 (3 points):  FNA if >= 2.5 cm; follow-up if 1.5-2.4 cm in 1, 3, and 5   years      TR2 (2 points):  No FNA or follow-up      TR1 (0 points):  No FNA or follow-up      ACR TI-RADS recommends that no more than two nodules with the highest ACR   TI-RADS point total should be biopsied and no more than four nodules should be followed. CT CHEST ABDOMEN PELVIS W CONTRAST   Final Result   1. No acute intrathoracic abnormality. 2. Large heterogeneously enhancing mass in the right vulva/perineum is   concerning for a malignant process. There is also gas within this lesion,   suggesting ulceration. Clinical correlation and tissue sampling are   recommended for further evaluation. 3. Bilateral inguinal lymphadenopathy. 4. Enlarged, heterogeneous thyroid. Ultrasound is recommended. RECOMMENDATIONS:   Incidental heterogeneous and enlarged thyroid. Recommend thyroid US. Reference: J Am Riana Radiol. 2015 Feb;12(2): 143-50         CT Cervical Spine WO Contrast   Final Result   1. No acute abnormality of the cervical spine. 2. Multilevel degenerative changes. RECOMMENDATIONS:   1 cm incidental right thyroid nodule. No follow-up imaging is recommended. Reference: J Am Riana Radiol. 2015 Feb;12(2): 143-50         CT Head WO Contrast   Final Result   No acute intracranial abnormality. XR CHEST PORTABLE   Final Result   No acute cardiopulmonary abnormality.                  Assessment/Plan:    Active Hospital Problems    Diagnosis     Severe malnutrition ([facility] [E43]      Priority: Medium    Acute cystitis with hematuria [N30.01]      Priority: Medium    Severe sepsis ([facility] [A41.9, R65.20]      Priority: Medium    Septic shock ([address] [A41.9, R65.21]      Priority: Medium    Tachycardia [R00.0]      Priority: Medium    Neutrophilic leukocytosis [C73.1]      Priority: Medium    Elevated lactic acid level [R79.89]      Priority: Medium    Elevated liver function tests [R79.89]      Priority: Medium    Generalized weakness [R53.1]      Priority: Medium    Fatigue [R53.83]      Priority: Medium    Vulvar mass [N90.89]      Priority: Medium    Enlarged, heterogeneous thyroid [E04.9]      Priority: Medium    Mixed hyperlipidemia [E78.2]     DMII (diabetes mellitus, type 2) ([facility] [E11.9]      1.  UTI, patient was started on cefepime, Also vancomycin and Flagyl added empirically on admission, ID consulted, urine culture positive for E. coli, apparently ESBL, changed antibiotics to meropenem pending further ID recommendations. 2.  Perineal/vulvar mass, gynecology consulted, plans for chemotherapy and chemoradiation, also oncology recommended GI consult for potential colonoscopy, added, patient declining. 3.  Sepsis, severe, IV fluid given, IV fluid maintenance.  Monitor closely  4.  Elevated LFTs, monitor for now  5.  Generalized weakness, due to above, PT OT  6.  Large thyroid, heterogeneous, possible nodules, ordered ultrasound and with multiple thyroid nodules. There is nodule that meets criteria for FNA patient needs to follow-up with endocrinology to proceed with FNA. This was discussed in details with the patient discussed the potential risk of having malignancy, verbalized understanding and willingness to follow-up. 7.  Diabetes mellitus, sliding scale  8.  Elevated lactic acid on admission, improved  9.  Hypercalcemia on admission, received IV fluid, improving, alendronate given by oncology  10.  Anemia, no obvious bleeding, suspecting due to vulvar mass which is highly suspicious for malignancy along with necrotic tissue. Diet: ADULT DIET;  Regular; 5 carb choices (75 gm/meal)  ADULT ORAL NUTRITION SUPPLEMENT; Breakfast, Lunch, Dinner; Diabetic Oral Supplement  Code Status: Emilia Mondragon MD

## 2022-06-07 ENCOUNTER — OFFICE VISIT (OUTPATIENT)
Dept: GYNECOLOGY | Age: 32
End: 2022-06-07
Payer: COMMERCIAL

## 2022-06-07 ENCOUNTER — TELEPHONE (OUTPATIENT)
Dept: GYNECOLOGY | Age: 32
End: 2022-06-07

## 2022-06-07 VITALS
HEIGHT: 64 IN | SYSTOLIC BLOOD PRESSURE: 140 MMHG | WEIGHT: 220 LBS | DIASTOLIC BLOOD PRESSURE: 90 MMHG | BODY MASS INDEX: 37.56 KG/M2

## 2022-06-07 DIAGNOSIS — N80.9 ENDOMETRIOSIS: ICD-10-CM

## 2022-06-07 DIAGNOSIS — R31.9 HEMATURIA, UNSPECIFIED TYPE: ICD-10-CM

## 2022-06-07 DIAGNOSIS — R10.2 PELVIC PAIN: Primary | ICD-10-CM

## 2022-06-07 DIAGNOSIS — N85.2 ENLARGED UTERUS: ICD-10-CM

## 2022-06-07 DIAGNOSIS — N80.03 ADENOMYOSIS: ICD-10-CM

## 2022-06-07 DIAGNOSIS — Z12.4 SCREENING FOR MALIGNANT NEOPLASM OF CERVIX: ICD-10-CM

## 2022-06-07 DIAGNOSIS — N30.01 ACUTE CYSTITIS WITH HEMATURIA: ICD-10-CM

## 2022-06-07 LAB
BILIRUBIN, URINE, POC: NEGATIVE
BLOOD URINE, POC: ABNORMAL
GLUCOSE URINE, POC: NEGATIVE
KETONES, URINE, POC: NEGATIVE
LEUKOCYTE ESTERASE, URINE, POC: ABNORMAL
NITRITE, URINE, POC: POSITIVE
PH, URINE, POC: 6 (ref 4.6–8)
PROTEIN,URINE, POC: ABNORMAL
SPECIFIC GRAVITY, URINE, POC: 1.03 (ref 1–1.03)
URINALYSIS CLARITY, POC: ABNORMAL
URINALYSIS COLOR, POC: YELLOW
UROBILINOGEN, POC: 0.2

## 2022-06-07 PROCEDURE — 76830 TRANSVAGINAL US NON-OB: CPT | Performed by: OBSTETRICS & GYNECOLOGY

## 2022-06-07 PROCEDURE — 81003 URINALYSIS AUTO W/O SCOPE: CPT | Performed by: OBSTETRICS & GYNECOLOGY

## 2022-06-07 PROCEDURE — 99214 OFFICE O/P EST MOD 30 MIN: CPT | Performed by: OBSTETRICS & GYNECOLOGY

## 2022-06-07 RX ORDER — DOXYCYCLINE HYCLATE 100 MG
TABLET ORAL
Qty: 20 TABLET | Refills: 0 | Status: SHIPPED | OUTPATIENT
Start: 2022-06-07 | End: 2022-08-12 | Stop reason: ALTCHOICE

## 2022-06-07 RX ORDER — CIPROFLOXACIN 500 MG/1
TABLET, FILM COATED ORAL
Qty: 10 TABLET | Refills: 0 | Status: SHIPPED | OUTPATIENT
Start: 2022-06-07 | End: 2022-08-12 | Stop reason: ALTCHOICE

## 2022-06-07 RX ORDER — FLUCONAZOLE 150 MG/1
TABLET ORAL
Qty: 2 TABLET | Refills: 0 | Status: SHIPPED | OUTPATIENT
Start: 2022-06-07 | End: 2022-08-12 | Stop reason: ALTCHOICE

## 2022-06-07 NOTE — PROGRESS NOTES
LUZ Apodaca is a 28 y.o. female seen for pelvic pain, dysuria, and irregular periods. She is not trying for pregnancy, but not preventing pregnancy with any birth control. She has an appointment next week for her regular annual exam.   This patient had dipstick urine today which revealed large blood and positive nitrates. She will be treated with Cipro and a culture will be sent. She complains now of 2-month history of increasing pelvic pain. She also has adnexal pain with her menses and has a history of stage IV endometriosis. Last laparoscopic clinic was in 2017 followed by 2 pregnancies. She request a repeat laser laparoscopy. On today's exam the pelvis was very tender and she also be treated with doxycycline while we are waiting on the surgery. Today's ultrasound was not contributory. The patient does appear to have adenomyosis    Past Medical History, Past Surgical History, Family history, Social History, and Medications were all reviewed with the patient today and updated as necessary. Current Outpatient Medications   Medication Sig    ciprofloxacin (CIPRO) 500 mg tablet Take 1 tablet twice daily for bladder infection    doxycycline hyclate (VIBRA-TABS) 100 MG tablet Take 1 tablet by mouth twice daily with meals    fluconazole (DIFLUCAN) 150 MG tablet Take 1 by mouth after completion of antibiotics and repeat in 48 hours     No current facility-administered medications for this visit. Allergies   Allergen Reactions    Latex Rash    Hydromorphone Itching     Past Medical History:   Diagnosis Date    Abnormal Papanicolaou smear of cervix     Anemia     Asthma     as a child    Endometriosis 5/24/2017 5/24/17: dx lsc w/ [facility] POSTOPERATIVE DIAGNOSES  1. Stage IV endometriosis with involvement of the bladder flap  and adhesions to the anterior lower fundus. 2. Adhesions of the colon to the posterior lower uterine segment.   3. Bilateral adhesions of the ovaries meals    Hematuria, unspecified type  -     AMB POC URINALYSIS DIP STICK AUTO W/O MICRO  -     Culture, Urine    Enlarged uterus    Adenomyosis    Endometriosis    Screening for malignant neoplasm of cervix  -     PAP IGP,Aptima HPV,CtNg Age Gdln; Future    Acute cystitis with hematuria  -     ciprofloxacin (CIPRO) 500 mg tablet; Take 1 tablet twice daily for bladder infection    Other orders  -     fluconazole (DIFLUCAN) 150 MG tablet; Take 1 by mouth after completion of antibiotics and repeat in 48 hours              Time:  I spent  30 minutes in preparing to see patient (including chart review and preparation), obtaining and/or reviewing additional medical history, performing a physical exam and evaluation, documenting clinical information in the electronic health record, independently interpreting results, communicating results to patient, family or caregiver, and/or coordinating care. No follow-up provider specified.         Cash Ramey MD

## 2022-06-07 NOTE — TELEPHONE ENCOUNTER
Patient called and wanted to know why she had to have an ultrasound today if endometriosis will not be seen. Advised we needed to check the endometrium, ovaries, uterus and pelvic area. Her next step will be the laparoscopy to address endometriosis and laser any areas that are found.

## 2022-06-09 LAB
BACTERIA SPEC CULT: ABNORMAL
BACTERIA SPEC CULT: ABNORMAL
SERVICE CMNT-IMP: ABNORMAL

## 2022-06-11 LAB — AGE GDLN ACOG TESTING: NORMAL

## 2022-06-16 LAB
CYTOLOGIST CVX/VAG CYTO: NORMAL
CYTOLOGY CVX/VAG DOC THIN PREP: NORMAL
HPV APTIMA: NEGATIVE
Lab: NORMAL
PATH REPORT.FINAL DX SPEC: NORMAL
STAT OF ADQ CVX/VAG CYTO-IMP: NORMAL

## 2022-06-21 ENCOUNTER — PREP FOR PROCEDURE (OUTPATIENT)
Dept: GYNECOLOGY | Age: 32
End: 2022-06-21

## 2022-06-21 PROBLEM — R10.2 PELVIC PAIN IN FEMALE: Status: ACTIVE | Noted: 2022-06-21

## 2022-06-21 PROBLEM — N94.6 DYSMENORRHEA: Status: ACTIVE | Noted: 2022-06-21

## 2022-06-21 PROBLEM — N80.9 ENDOMETRIOSIS: Status: ACTIVE | Noted: 2022-06-21

## 2022-06-21 PROBLEM — N80.03 ADENOMYOSIS: Status: ACTIVE | Noted: 2022-06-21

## 2022-07-14 ENCOUNTER — CLINICAL DOCUMENTATION (OUTPATIENT)
Dept: GYNECOLOGY | Age: 32
End: 2022-07-14

## 2022-08-10 ENCOUNTER — OFFICE VISIT (OUTPATIENT)
Dept: OBGYN CLINIC | Age: 32
End: 2022-08-10
Payer: COMMERCIAL

## 2022-08-10 VITALS
DIASTOLIC BLOOD PRESSURE: 78 MMHG | BODY MASS INDEX: 36.49 KG/M2 | WEIGHT: 219 LBS | HEIGHT: 65 IN | SYSTOLIC BLOOD PRESSURE: 122 MMHG

## 2022-08-10 DIAGNOSIS — O09.299 HISTORY OF NEURAL TUBE DEFECT IN INFANT IN PRIOR PREGNANCY, CURRENTLY PREGNANT: ICD-10-CM

## 2022-08-10 DIAGNOSIS — Z34.91 ENCOUNTER FOR SUPERVISION OF NORMAL PREGNANCY IN FIRST TRIMESTER, UNSPECIFIED GRAVIDITY: ICD-10-CM

## 2022-08-10 DIAGNOSIS — Z87.728 HISTORY OF NEURAL TUBE DEFECT: ICD-10-CM

## 2022-08-10 DIAGNOSIS — Z28.9 COVID-19 VACCINE SERIES NOT COMPLETED: ICD-10-CM

## 2022-08-10 DIAGNOSIS — N92.6 MISSED MENSES: ICD-10-CM

## 2022-08-10 DIAGNOSIS — Z98.891 HISTORY OF 2 CESAREAN SECTIONS: ICD-10-CM

## 2022-08-10 DIAGNOSIS — O09.299 HISTORY OF FETAL ANOMALY IN PRIOR PREGNANCY, CURRENTLY PREGNANT: ICD-10-CM

## 2022-08-10 DIAGNOSIS — Z28.311 COVID-19 VACCINE SERIES NOT COMPLETED: ICD-10-CM

## 2022-08-10 DIAGNOSIS — N92.6 MISSED MENSES: Primary | ICD-10-CM

## 2022-08-10 DIAGNOSIS — O26.893 RH NEGATIVE STATUS DURING PREGNANCY IN THIRD TRIMESTER: ICD-10-CM

## 2022-08-10 DIAGNOSIS — O99.213 OBESITY AFFECTING PREGNANCY IN THIRD TRIMESTER: ICD-10-CM

## 2022-08-10 DIAGNOSIS — N80.9 ENDOMETRIOSIS: ICD-10-CM

## 2022-08-10 DIAGNOSIS — Z67.91 RH NEGATIVE STATUS DURING PREGNANCY IN THIRD TRIMESTER: ICD-10-CM

## 2022-08-10 DIAGNOSIS — O10.013 PRE-EXISTING ESSENTIAL HYPERTENSION COMPLICATING PREGNANCY IN THIRD TRIMESTER: ICD-10-CM

## 2022-08-10 DIAGNOSIS — O09.93 SUPERVISION OF HIGH RISK PREGNANCY IN THIRD TRIMESTER: ICD-10-CM

## 2022-08-10 LAB
ABO + RH BLD: NORMAL
ALBUMIN SERPL-MCNC: 3.7 G/DL (ref 3.5–5)
ALBUMIN/GLOB SERPL: 1 {RATIO} (ref 1.2–3.5)
ALP SERPL-CCNC: 53 U/L (ref 50–136)
ALT SERPL-CCNC: 16 U/L (ref 12–65)
ANION GAP SERPL CALC-SCNC: 5 MMOL/L (ref 7–16)
AST SERPL-CCNC: 9 U/L (ref 15–37)
BASOPHILS # BLD: 0 K/UL (ref 0–0.2)
BASOPHILS NFR BLD: 0 % (ref 0–2)
BILIRUB SERPL-MCNC: 0.4 MG/DL (ref 0.2–1.1)
BLOOD GROUP ANTIBODIES SERPL: NORMAL
BUN SERPL-MCNC: 6 MG/DL (ref 6–23)
CALCIUM SERPL-MCNC: 9 MG/DL (ref 8.3–10.4)
CHLORIDE SERPL-SCNC: 105 MMOL/L (ref 98–107)
CO2 SERPL-SCNC: 26 MMOL/L (ref 21–32)
CREAT SERPL-MCNC: 0.7 MG/DL (ref 0.6–1)
CREAT UR-MCNC: 123 MG/DL
DIFFERENTIAL METHOD BLD: NORMAL
EOSINOPHIL # BLD: 0.2 K/UL (ref 0–0.8)
EOSINOPHIL NFR BLD: 2 % (ref 0.5–7.8)
ERYTHROCYTE [DISTWIDTH] IN BLOOD BY AUTOMATED COUNT: 14 % (ref 11.9–14.6)
EST. AVERAGE GLUCOSE BLD GHB EST-MCNC: 123 MG/DL
GLOBULIN SER CALC-MCNC: 3.8 G/DL (ref 2.3–3.5)
GLUCOSE SERPL-MCNC: 84 MG/DL (ref 65–100)
HBA1C MFR BLD: 5.9 % (ref 4.8–5.6)
HBV SURFACE AG SER QL: NONREACTIVE
HCT VFR BLD AUTO: 36.4 % (ref 35.8–46.3)
HCV AB SER QL: NONREACTIVE
HGB BLD-MCNC: 12 G/DL (ref 11.7–15.4)
HIV 1+2 AB+HIV1 P24 AG SERPL QL IA: NONREACTIVE
HIV 1/2 RESULT COMMENT: NORMAL
IMM GRANULOCYTES # BLD AUTO: 0 K/UL (ref 0–0.5)
IMM GRANULOCYTES NFR BLD AUTO: 1 % (ref 0–5)
LDH SERPL L TO P-CCNC: 175 U/L (ref 100–190)
LYMPHOCYTES # BLD: 1.8 K/UL (ref 0.5–4.6)
LYMPHOCYTES NFR BLD: 21 % (ref 13–44)
MCH RBC QN AUTO: 27.6 PG (ref 26.1–32.9)
MCHC RBC AUTO-ENTMCNC: 33 G/DL (ref 31.4–35)
MCV RBC AUTO: 83.7 FL (ref 79.6–97.8)
MONOCYTES # BLD: 0.5 K/UL (ref 0.1–1.3)
MONOCYTES NFR BLD: 6 % (ref 4–12)
NEUTS SEG # BLD: 5.8 K/UL (ref 1.7–8.2)
NEUTS SEG NFR BLD: 70 % (ref 43–78)
NRBC # BLD: 0 K/UL (ref 0–0.2)
PLATELET # BLD AUTO: 234 K/UL (ref 150–450)
PMV BLD AUTO: 11 FL (ref 9.4–12.3)
POTASSIUM SERPL-SCNC: 4.1 MMOL/L (ref 3.5–5.1)
PROT SERPL-MCNC: 7.5 G/DL (ref 6.3–8.2)
PROT UR-MCNC: 12 MG/DL
PROT/CREAT UR-RTO: 0.1
RBC # BLD AUTO: 4.35 M/UL (ref 4.05–5.2)
SODIUM SERPL-SCNC: 136 MMOL/L (ref 136–145)
TSH W FREE THYROID IF ABNORMAL: 0.61 UIU/ML (ref 0.36–3.74)
URATE SERPL-MCNC: 3 MG/DL (ref 2.6–6)
WBC # BLD AUTO: 8.2 K/UL (ref 4.3–11.1)

## 2022-08-10 PROCEDURE — 99215 OFFICE O/P EST HI 40 MIN: CPT | Performed by: OBSTETRICS & GYNECOLOGY

## 2022-08-10 PROCEDURE — 76830 TRANSVAGINAL US NON-OB: CPT | Performed by: OBSTETRICS & GYNECOLOGY

## 2022-08-10 RX ORDER — ONDANSETRON 8 MG/1
8 TABLET, ORALLY DISINTEGRATING ORAL EVERY 6 HOURS PRN
Qty: 120 TABLET | Refills: 3 | Status: SHIPPED | OUTPATIENT
Start: 2022-08-10 | End: 2022-10-20 | Stop reason: ALTCHOICE

## 2022-08-10 RX ORDER — LANOLIN ALCOHOL/MO/W.PET/CERES
400 CREAM (GRAM) TOPICAL DAILY
Qty: 30 TABLET | Refills: 8 | Status: SHIPPED | OUTPATIENT
Start: 2022-08-10

## 2022-08-10 NOTE — LETTER
[address] [id number]  Phone: [phone]  Fax: [phone]    Twila Dejesus MD        August 10, 2022       To Whom It May Concern:    Audrey Dove is under my care for her current pregnancy with a DENNISE of: 3-2-2023. If you have any questions or concerns, please don't hesitate to call.     Sincerely,              Twila Dejesus MD

## 2022-08-10 NOTE — PROGRESS NOTES
CC:  Missed Period      HPI:  28 y.o.  Nelson Mack presents for pregnancy confirmation and establish DENNISE. Patient's last menstrual period was 2022. .      Mild n/v --- better w/ B6/unisom   no vb/cramping      Has had COVID 19 infxn -- 2021  Has NOT been COVID 19 vaccinated --counseled and medical websites given for reference        OB hx:  G1:  LTCS w/ G1 at 38w4d w/ [facility] on 2018 due to BPD estimated 10.5cm, multiple fetal anomalies, no diagnosis ever made (8#2.9) --subsequent  demise at approx 1 mo age    G4:   Repeat LTCS at 39w0d w/ Dr. Rd Allen on 18 (7#1.1)                 CHTN  Diagnosed  in ER w/ \"stroke range bps\" per pt report  No longer taking Norvasc --pt self Dc'd around  and no meds since  No hx GISELA w/ G1/G2   Bp wnl today  Baseline HELLP labs ___  Baseline P:C ratio ___  Last EKG wnl 2018   ASA 162mg recommended        Hx baby with fetal anomaly/NTD:  G1:  LTCS w/ G1 at 38w4d w/ [facility] on 2018 due to BPD estimated 10.5cm, multiple fetal anomalies, no diagnosis ever made (8#2.9) --subsequent  demise at approx 1 mo age    (increased risk for NTD w/ future babies-->needs extra folic acid until end of 1st trimester (rx Folate 4mg every day)              Hx LTCS x2:   Op note from Dr. Rd Allen reviewed: \"adhesions, significant bleeding during case, poor exposure due to obesity/adhesions\";  \"very difficult visualization due to obesity/adhesions.  Consider vertical midline skin incision if has another c/s\"    Pt states now considering TOLAC--counseled extensively and will continue to do so    Placenta __           BMI 37 (pregravid):  Hgb A1C ___   No hx GDM   States wants a more \"natural\" option then Glucola--counseled on option of checking fsbg          Rh neg:  RhoGAM 28 weeks           Known hx Stage IV Endometriosis dx via laparoscopy--- sees Dr Vinicio Gastelum for GYN care           Fam hx any chromosomal or inheritable disorders:     None        Her Ob history is as follows:  # 1 - Date: 18, Sex: Male, Weight: 8 lb 2.9 oz (3.711 kg), GA: 38w4d, Delivery: , Low Transverse, Apgar1: 8, Apgar5: 8, Living: , Birth Comments: lived 24 days. multiple fetal anomalies    # 2 - Date: 18, Sex: Male, Weight: 7 lb 1.1 oz (3.205 kg), GA: 39w0d, Delivery: , Low Transverse, Apgar1: 8, Apgar5: 9, Living: Living, Birth Comments: None    # 3 - Date: None, Sex: None, Weight: None, GA: None, Delivery: None, Apgar1: None, Apgar5: None, Living: None, Birth Comments: None      Past medical History:    Active Ambulatory Problems     Diagnosis Date Noted    Supervision of high risk pregnancy in third trimester 08/15/2017    Rh negative status during pregnancy in third trimester 2017    Obesity affecting pregnancy in third trimester 2017    History of 2  sections 2018    History of fetal anomaly in prior pregnancy, currently pregnant 2018    Obesity, morbid ([address] 01/10/2019    Pre-existing CHTN 2017    History of neural tube defect in infant in prior pregnancy, currently pregnant 2018    Dysmenorrhea 2022    Endometriosis 2022    Adenomyosis 2022    COVID-19 vaccine series not completed 2022     Resolved Ambulatory Problems     Diagnosis Date Noted    Short interval between pregnancies affecting pregnancy, antepartum 2018    Pelvic pain in female 2022     Past Medical History:   Diagnosis Date    Abnormal Papanicolaou smear of cervix     Anemia     Asthma     Family history of endometriosis 2017    Frequent sinus infections     Hypertension     Intramural leiomyoma of uterus 2017    LGSIL Pap smear of vagina     Migraine without aura, not intractable 2017    Morbid obesity ([address]     Rh negative state in antepartum period        Current Outpatient Medications   Medication Sig    Prenatal Vit-Fe Fum-FA-Omega (PRENATAL MULTI +DHA PO) Take by mouth    folic acid (FOLATE) 400 MCG tablet Take 1 tablet by mouth in the morning. ondansetron (ZOFRAN-ODT) 8 MG TBDP disintegrating tablet Place 1 tablet under the tongue every 6 hours as needed for Nausea or Vomiting    ciprofloxacin (CIPRO) 500 mg tablet Take 1 tablet twice daily for bladder infection (Patient not taking: Reported on 8/10/2022)    doxycycline hyclate (VIBRA-TABS) 100 MG tablet Take 1 tablet by mouth twice daily with meals (Patient not taking: Reported on 8/10/2022)    fluconazole (DIFLUCAN) 150 MG tablet Take 1 by mouth after completion of antibiotics and repeat in 48 hours (Patient not taking: Reported on 8/10/2022)     No current facility-administered medications for this visit. Past Surgical:  has a past surgical history that includes [facility] tooth extraction; Colposcopy (); gyn (2017); and  section (02/2018 x2). Allergies   Allergen Reactions    Latex Rash    Hydromorphone Itching         Current Outpatient Medications on File Prior to Visit   Medication Sig Dispense Refill    Prenatal Vit-Fe Fum-FA-Omega (PRENATAL MULTI +DHA PO) Take by mouth      ciprofloxacin (CIPRO) 500 mg tablet Take 1 tablet twice daily for bladder infection (Patient not taking: Reported on 8/10/2022) 10 tablet 0    doxycycline hyclate (VIBRA-TABS) 100 MG tablet Take 1 tablet by mouth twice daily with meals (Patient not taking: Reported on 8/10/2022) 20 tablet 0    fluconazole (DIFLUCAN) 150 MG tablet Take 1 by mouth after completion of antibiotics and repeat in 48 hours (Patient not taking: Reported on 8/10/2022) 2 tablet 0     No current facility-administered medications on file prior to visit. GYN hx:  Last Pap: 2022--neg cytology       Hx of Abnl Paps:  LGSIL ; no  Hx LEEP/CKC  Wnl thereafter      Hx STDs:  Hx Chlamydia       Gardasil vaccine: yes all 3      Carol Starkey  reports that she has never smoked.  She has never used smokeless tobacco. She reports that she does not drink alcohol and does not use drugs. /78   Ht 5' 4.5\" (1.638 m)   Wt 219 lb (99.3 kg)   LMP 2022   BMI 37.01 kg/m²     Physical Exam:  Constitutional: She appears well-developed and well-nourished. No distress. HENT:    Head: Normocephalic and atraumatic. Cardiovascular: Regular pulse   Pulmonary/Chest: Effort normal  Skin: She is not diaphoretic. Psychiatric: She has a normal mood and affect. Her behavior is normal. Thought content normal. .         Pelvic US today:  LMP 2022 DENNISE(LMP) 2023 GA(LMP) 10w6d  GA(AUA) 10w5d  DENNISE(AUA) 2023    + IUP WITH + FHM  YS NOT VISUALIZED, BUT PLACENTA APPEARS  TO BE ANTERIOR AT THIS TIME. CRL= 10.5WKS  RT OV- SMALL COLLAPSED CL CYST  LT OV- WNL  NO FF                Counseling:  -Continue PNV with at least 477WFJ Folic acid QD (4mg if previous pregnancy complicated by a fetal NTD)  -B6/Unisom (Diclegis) if nausea/vomitin g  -Calcium:  recommend 1000mg/QD  (500 in 2 Tums), milk, cheese, yogurt, leafy green vegetables  -Iron:  30mg every day (red meat, dark beans)  -Counseled on appropriate foods to avoid in pregnancy:   -unpasteurized milk/cheeses   -hot dogs, luncheon meats, cold cuts unless heated until steaming    -refrigerated mata and meat spreads   -raw/undercooked seafood, eggs, meat  -Avoid litter box if have cat(s)   -Genetic screening options discussed          Patient counseled face to face for more than 50% of the total time spent with the patient. On this date I have spent 68 minutes reviewing previous notes, op reports, history, test results, and face to face with the patient discussing the diagnosis and importance of compliance with the treatment plan as well as documenting.      Modifier 22         Assessment/Plan:    28 y.o.  at [facility] by d=10wk US with  IUP:    1) Routine pregnancy:  -PN labs today  -FU w/ RN in 1-2 wks for new pregnancy counseling   -RTO for New OB visti in 4wks   -Mfm referral   -Pap up-to-date   -Recommend not attempting TOLAC   -ASA 162mg   -Rx Folate 4mg every day            Jared Olivares MD

## 2022-08-11 LAB — RPR SER QL: NONREACTIVE

## 2022-08-12 PROBLEM — Z98.891 HISTORY OF 2 CESAREAN SECTIONS: Status: ACTIVE | Noted: 2018-05-28

## 2022-08-12 PROBLEM — O09.93 SUPERVISION OF HIGH RISK PREGNANCY IN THIRD TRIMESTER: Status: ACTIVE | Noted: 2017-08-15

## 2022-08-12 PROBLEM — O10.013 PRE-EXISTING ESSENTIAL HYPERTENSION COMPLICATING PREGNANCY IN THIRD TRIMESTER: Status: ACTIVE | Noted: 2017-07-11

## 2022-08-12 PROBLEM — O26.893 RH NEGATIVE STATUS DURING PREGNANCY IN THIRD TRIMESTER: Status: ACTIVE | Noted: 2017-07-14

## 2022-08-12 PROBLEM — Z28.9 COVID-19 VACCINE SERIES NOT COMPLETED: Status: ACTIVE | Noted: 2022-08-12

## 2022-08-12 PROBLEM — O99.213 OBESITY AFFECTING PREGNANCY IN THIRD TRIMESTER: Status: ACTIVE | Noted: 2017-07-11

## 2022-08-12 PROBLEM — Z67.91 RH NEGATIVE STATUS DURING PREGNANCY IN THIRD TRIMESTER: Status: ACTIVE | Noted: 2017-07-14

## 2022-08-12 PROBLEM — Z28.311 COVID-19 VACCINE SERIES NOT COMPLETED: Status: ACTIVE | Noted: 2022-08-12

## 2022-08-12 PROBLEM — O09.299 HISTORY OF FETAL ANOMALY IN PRIOR PREGNANCY, CURRENTLY PREGNANT: Status: ACTIVE | Noted: 2018-05-28

## 2022-08-12 PROBLEM — O09.899 SHORT INTERVAL BETWEEN PREGNANCIES AFFECTING PREGNANCY, ANTEPARTUM: Status: RESOLVED | Noted: 2018-05-29 | Resolved: 2022-08-12

## 2022-08-12 PROBLEM — R10.2 PELVIC PAIN IN FEMALE: Status: RESOLVED | Noted: 2022-06-21 | Resolved: 2022-08-12

## 2022-08-12 PROBLEM — O09.299 HISTORY OF NEURAL TUBE DEFECT IN INFANT IN PRIOR PREGNANCY, CURRENTLY PREGNANT: Status: ACTIVE | Noted: 2018-05-28

## 2022-08-12 NOTE — RESULT ENCOUNTER NOTE
Hemoglobin A1c elevated at 5.9--> needs early 2-hour glucose tolerance test at 16-18wks  Please schedule    Baseline HELLP labs within normal limits  Baseline PC ratio 0.1      TSH wnl    Rh-, antibody screen negative--needs RhoGAM at 28 weeks; sooner if any vb with pregnancy

## 2022-08-13 LAB
BACTERIA SPEC CULT: NORMAL
BACTERIA SPEC CULT: NORMAL
SERVICE CMNT-IMP: NORMAL

## 2022-08-24 PROBLEM — N80.03 ADENOMYOSIS: Status: RESOLVED | Noted: 2022-06-21 | Resolved: 2022-08-24

## 2022-08-24 PROBLEM — F41.9 ANXIETY: Status: ACTIVE | Noted: 2019-12-20

## 2022-08-24 PROBLEM — N94.6 DYSMENORRHEA: Status: RESOLVED | Noted: 2022-06-21 | Resolved: 2022-08-24

## 2022-08-24 PROBLEM — N80.9 ENDOMETRIOSIS: Status: RESOLVED | Noted: 2022-06-21 | Resolved: 2022-08-24

## 2022-08-25 PROBLEM — O09.91 SUPERVISION OF HIGH RISK PREGNANCY IN FIRST TRIMESTER: Status: ACTIVE | Noted: 2017-08-15

## 2022-08-25 PROBLEM — Z67.91 RH NEGATIVE STATUS DURING PREGNANCY IN THIRD TRIMESTER: Status: RESOLVED | Noted: 2017-07-14 | Resolved: 2022-08-25

## 2022-08-25 PROBLEM — O09.93 SUPERVISION OF HIGH RISK PREGNANCY IN THIRD TRIMESTER: Status: RESOLVED | Noted: 2017-08-15 | Resolved: 2022-08-25

## 2022-08-25 PROBLEM — Z28.9 COVID-19 VACCINE SERIES NOT COMPLETED: Status: RESOLVED | Noted: 2022-08-12 | Resolved: 2022-08-25

## 2022-08-25 PROBLEM — E66.01 OBESITY, MORBID (HCC): Status: RESOLVED | Noted: 2019-01-10 | Resolved: 2022-08-25

## 2022-08-25 PROBLEM — O26.893 RH NEGATIVE STATUS DURING PREGNANCY IN THIRD TRIMESTER: Status: RESOLVED | Noted: 2017-07-14 | Resolved: 2022-08-25

## 2022-08-25 PROBLEM — O10.011 PRE-EXISTING ESSENTIAL HYPERTENSION COMPLICATING PREGNANCY IN FIRST TRIMESTER: Status: ACTIVE | Noted: 2017-07-11

## 2022-08-25 PROBLEM — O99.211 OBESITY AFFECTING PREGNANCY IN FIRST TRIMESTER: Status: ACTIVE | Noted: 2017-07-11

## 2022-08-25 PROBLEM — Z28.311 COVID-19 VACCINE SERIES NOT COMPLETED: Status: RESOLVED | Noted: 2022-08-12 | Resolved: 2022-08-25

## 2022-08-29 ENCOUNTER — NURSE ONLY (OUTPATIENT)
Dept: OBGYN CLINIC | Age: 32
End: 2022-08-29

## 2022-08-29 ENCOUNTER — ROUTINE PRENATAL (OUTPATIENT)
Dept: OBGYN CLINIC | Age: 32
End: 2022-08-29
Payer: COMMERCIAL

## 2022-08-29 VITALS — DIASTOLIC BLOOD PRESSURE: 82 MMHG | SYSTOLIC BLOOD PRESSURE: 132 MMHG

## 2022-08-29 VITALS — BODY MASS INDEX: 36.5 KG/M2 | WEIGHT: 216 LBS

## 2022-08-29 DIAGNOSIS — O09.299 HISTORY OF FETAL ANOMALY IN PRIOR PREGNANCY, CURRENTLY PREGNANT: ICD-10-CM

## 2022-08-29 DIAGNOSIS — O99.340 ANXIETY DISORDER AFFECTING PREGNANCY, ANTEPARTUM: ICD-10-CM

## 2022-08-29 DIAGNOSIS — O99.211 OBESITY AFFECTING PREGNANCY IN FIRST TRIMESTER: ICD-10-CM

## 2022-08-29 DIAGNOSIS — O09.299 HISTORY OF NEURAL TUBE DEFECT IN INFANT IN PRIOR PREGNANCY, CURRENTLY PREGNANT: ICD-10-CM

## 2022-08-29 DIAGNOSIS — O10.011 PRE-EXISTING ESSENTIAL HYPERTENSION COMPLICATING PREGNANCY IN FIRST TRIMESTER: ICD-10-CM

## 2022-08-29 DIAGNOSIS — O09.91 SUPERVISION OF HIGH RISK PREGNANCY IN FIRST TRIMESTER: Primary | ICD-10-CM

## 2022-08-29 DIAGNOSIS — Z98.891 HISTORY OF 2 CESAREAN SECTIONS: ICD-10-CM

## 2022-08-29 DIAGNOSIS — F41.9 ANXIETY DISORDER AFFECTING PREGNANCY, ANTEPARTUM: ICD-10-CM

## 2022-08-29 DIAGNOSIS — Z3A.13 13 WEEKS GESTATION OF PREGNANCY: ICD-10-CM

## 2022-08-29 DIAGNOSIS — Z36.82 NUCHAL TRANSLUCENCY OF FETUS ON PRENATAL ULTRASOUND: ICD-10-CM

## 2022-08-29 PROCEDURE — 76813 OB US NUCHAL MEAS 1 GEST: CPT | Performed by: OBSTETRICS & GYNECOLOGY

## 2022-08-29 PROCEDURE — 99214 OFFICE O/P EST MOD 30 MIN: CPT | Performed by: OBSTETRICS & GYNECOLOGY

## 2022-08-29 PROCEDURE — 76801 OB US < 14 WKS SINGLE FETUS: CPT | Performed by: OBSTETRICS & GYNECOLOGY

## 2022-08-29 RX ORDER — ASPIRIN 81 MG/1
162 TABLET, CHEWABLE ORAL DAILY
COMMUNITY

## 2022-08-29 RX ORDER — POLYETHYLENE GLYCOL 3350 17 G/17G
POWDER, FOR SOLUTION ORAL
Qty: 850 G | Refills: 1 | Status: SHIPPED | OUTPATIENT
Start: 2022-08-29

## 2022-08-29 RX ORDER — ACETAMINOPHEN 500 MG
500 TABLET ORAL EVERY 6 HOURS PRN
COMMUNITY

## 2022-08-29 RX ORDER — DOCUSATE SODIUM 100 MG/1
100 CAPSULE, LIQUID FILLED ORAL 2 TIMES DAILY
COMMUNITY
End: 2022-10-20

## 2022-08-29 ASSESSMENT — PATIENT HEALTH QUESTIONNAIRE - PHQ9
SUM OF ALL RESPONSES TO PHQ QUESTIONS 1-9: 4
1. LITTLE INTEREST OR PLEASURE IN DOING THINGS: 2
SUM OF ALL RESPONSES TO PHQ9 QUESTIONS 1 & 2: 4
SUM OF ALL RESPONSES TO PHQ QUESTIONS 1-9: 4
2. FEELING DOWN, DEPRESSED OR HOPELESS: 2
SUM OF ALL RESPONSES TO PHQ QUESTIONS 1-9: 4
SUM OF ALL RESPONSES TO PHQ QUESTIONS 1-9: 4

## 2022-08-29 NOTE — PROGRESS NOTES
MF Consultation    Marylen Harms Pringle (: 1990) is a 28 y.o. Tellis Gentile at 13w4d with 3/2/2023, Alternate DENNISE Entry. Presents for evaluation of the following chief complaint(s):  Pregnancy Ultrasound and High Risk Pregnancy (NT, Obesity, Hx IUFD)     Patient is working full time (40 hours/week) at [employer] [facility] Scheduled to see Primary OB [facility]) on 9/15/22. No HAs, edema. Denies preeclamptic symptoms. No bleeding, LOF, cramping, ctxs, or vaginal pressure. Review of Systems - per HPI; otherwise unremarkable. History reviewed and updated as needed. Exam:   Vitals:    22 1310   BP: 132/82     Ultrasound: Please see formal ultrasound report under imaging tab. ASSESSMENT/PLAN:  Patient Active Problem List    Diagnosis Date Noted    Supervision of high risk pregnancy in first trimester 08/15/2017     Priority: High     OB hx:  G1:  LTCS w/ G1 at 38w4d w/ [facility] on 2018 due to BPD estimated 10.5cm, multiple fetal anomalies, no diagnosis ever made (8#2.9) --subsequent  demise at approx 1 mo age  G4:   Repeat LTCS at 39w0d w/ Dr. Albert Leon on 18 (7#1.1)  22 at [facility]: Normal NT and nasal bone. Genetic counseling done by MD.  [facility]. F/U MFM @ 17 weeks early anatomy/viability  Low dose Aspirin 162  mg daily is recommended to be started at 12-16 weeks (some benefit seen with starting up to 28 weeks) for the prevention of preeclampsia  in high risk women.  (U.S. Preventative Services Task Force, Annals of Internal Medicine 2015)    Start Vitamin D 2000IU daily and Calcium 1000mg daily (or may take Viactiv calcium chews x 2 per day) to aid in protection of bones and teeth. Anxiety disorder affecting pregnancy, antepartum 2022     Priority: Medium     22 at [facility]: Pt reports increase anxiety being in our office due to hx of anomalies in previous pregnancy 4 years ago. Reports feeling \"down\" and laying around at home. Feeling \"extra irritable\". Discussed starting SSRI. Start Zoloft 50 mg po daily as desired      Obesity affecting pregnancy in first trimester 2017     Priority: Medium     Pregravid BMI 37  No history of gestational diabetes  Hemoglobin A1c elevated at 5.9--> needs early 2-hour glucose tolerance test at 16-18wks  Please schedule  States wants a more \"natural\" option then Glucola--counseled on option of checking fsbg  --> no hx of GDM, will plan routine timing of glycemic control at 24-28wk with glucose checking (2022 [facility])      Pre-existing essential hypertension complicating pregnancy in first trimester 2017     Priority: Medium      Diagnosed  in ER w/ \"stroke range bps\" per pt report  No longer taking Norvasc --pt self Dc'd around  and no meds since  No hx GISELA w/ G1/G2   Bp wnl on preg intake  Baseline HELLP labs within normal limits  Baseline PC ratio 0.1   TSH wnl  Last EKG wnl 2018   ASA 162mg recommended  22 at [facility]: /82      History of 2  sections 2018     Priority: Low     Hx LTCS x2:   G1:  LTCS w/ G1 at 38w4d w/ GHS on 2018 due to BPD estimated 10.5cm, multiple fetal anomalies, no diagnosis ever made (8#2.9) --subsequent  demise at approx 1 mo age    G4:   Repeat LTCS at 39w0d w/ Dr. Felix Pedroza on 18 (7#1.1)      *Op note from Dr. Felix Pedroza reviewed: \"adhesions, significant bleeding during case, poor exposure due to obesity/adhesions\";  \"very difficult visualization due to obesity/adhesions.  Consider vertical midline skin incision if has another c/s\"    Pt states now considering TOLAC--counseled extensively and will continue to do so    Placenta __         History of fetal anomaly in prior pregnancy, currently pregnant 2018     Priority: Low       G1:  LTCS w/ G1 at 38w4d w/ [facility] on 2018 due to BPD estimated 10.5cm, multiple fetal anomalies, no diagnosis ever made (8#2.9) --subsequent  demise at approx 1 mo age    (increased risk for NTD w/ future babies-->needs extra folic acid until end of 1st trimester (rx Folate 4mg every day)        History of neural tube defect in infant in prior pregnancy, currently pregnant 05/28/2018     Priority: Low      See Hx of Fetal Anomaly Overview         Genetic counseling was performed by physician after reviewing patient's genetic history. The patient's Down syndrome age associated risk, as well as, risks of additional aneuploidy and genetic syndromes, are reduced by approximately 50% with a normal first trimester anatomy including, nuchal translucency and nasal bone. Ultrasound alone does not rule out all abnormalities of genetics and development. Maternal serum screening for aneuploidy was discussed with the patient including first trimester JACQUELINE-A/hCG, second trimester Quad screen (either in isolation or sequential with JACQUELINE-A) as well as non-invasive prenatal testing (NIPT) for aneuploidy from a maternal blood sample. Positive predictive and negative predictive values for these tests were explained, questions answered. Patient understands that these are screening tests that only assesses risk for select abnormalities (trisomies 15, 25, and 21, and sex chromosome abnormalities (NIPT), as well as markers for placental health (JACQUELINE-A) and risk for open neural tube defects (quad)). NIPT is designed for high risk populations, but should be considered by all patients who desire the current best option for screening for applicable genetic abnormalities. Limitations of technology discussed based on maternal age, technical aspects of tests, and maternal BMI reviewed. All questions answered and concerns discussed. Patient elected to proceed with Ultrasound only with no serum screening. 1) Chronic HTN    BP stable today. Patient is currently not taking medications for blood pressure control.      Chronic hypertension is present in 0.9-1.5% of pregnant women and may result in significant maternal, fetal, and  morbidity and mortality. The rate of maternal chronic hypertension increased by 67% from 2000 to , with the largest increase (87%) among  women. This increase is largely secondary to the obesity epidemic and increasing maternal age and is expected to continue rising. Traditionally, the definition of hypertension was systolic blood pressure >456 and/or diastolic >92 on at least two measures four or more hours apart. However, recent recommendations in the cardiology literature now suggest beginning treatment in nonpregnant adults with risk factors for current or future cardiovascular disease in patients with stage 1 hypertension (systolic blood pressure of 863-244 mm Hg or diastolic blood pressure of 80-89 mm Hg). The impact of this change on reproductive outcomes is unclear at this time. However, at this time, recommend continuing to treat patients already undergoing treatment prior to pregnancy as chronic hypertension in pregnancy. Until further data is available, would not begin treatment for blood pressures at this stage of hypertension in pregnancy. Increased surveillance is recommended as these patients had a higher risk of preeclampsia, gestational diabetes, and indicated  birth than normotensive patients. Chronic hypertension is considered to present prior to 20 weeks of gestation with gestational hypertension/preeclampsia developing after 20 weeks. Under diagnosis of chronic hypertension with third trimester exacerbation is common based on lack of blood pressure values available prior to initiation of prenatal care and the normal physiologic decrease in blood pressure in early pregnancy. Confounding the diagnosis further, approximately 11% of women with chronic hypertension have proteinuria at baseline.   Up to 20-50% of women with chronic hypertension may develop superimposed preeclampsia, an incidence five times or more than that of pregnant women without hypertension. There are currently no useful tools for predicting superimposed preeclampsia, but the risk of superimposed preeclampsia is higher in women who are , obese, smoke, have had hypertension for 4 years or more, have a diastolic blood pressure higher than 100 mm Hg at baseline, and have a history of preeclampsia. A woman with chronic hypertension should be evaluated prepregnancy to identify possible end-organ involvement, to consider evaluation for secondary hyper tension, and for the optimization of maternal comorbidities (eg, obesity, diabetes) before pregnancy. Women with modifiable risk factors, such as obesity and poor glycemic control, may benefit from counseling on weight loss, diet, and lifestyle modifications. Women who have had poorly controlled hypertension for more than 4 years or those suspected of having long-standing hypertension based on age (older than 30 years) are more likely to have cardiac hypertrophic changes, cardiomegaly, and ischemic heart disease and should have ekg, with echocardiogram as appropriate. Treatment of chronic hypertension, in the absence of clear evidence supporting the use of antihypertensive therapy for lower blood pressures, initiation of antihypertensive therapy is recommended for persistent chronic hypertension when systolic pressure is 200 mm Hg or more, diastolic pressure is 90 mm Hg or more, or both. In the setting of comorbidities or underlying impaired renal function, treating at lower blood pressure thresholds may be appropriate. Blood pressure in the postpartum period is often higher compared with antepartum levels, particularly in the first 1-2 weeks postpartum.  Severe hypertension or superimposed preeclampsia also may develop for the first time in the postpartum period; therefore, women with chronic hypertension should be closely monitored for blood pressure changes and symptoms of severe-range hypertension or superimposed preeclampsia. Early ambulatory visits in the first 1-2 weeks after delivery or home blood pressure monitoring may be prudent surveillance for these postpartum changes. Medication in the weeks postpartum should be adjusted to maintain a systolic blood pressure not higher than 150 mm Hg and a diastolic blood pressure not higher than 100 mm Hg. Postpartum anti-hypertensive medication choice may need to be tailored based on patient response. Patient should establish care with primary care provider as is at increased risk for cardiovascular and cerebrovascular disease by at least 2-4 times her baseline risk. Patient should establish care with primary care provider as is at increased risk for cardiovascular and cerebrovascular disease by at least 2-4 times her baseline risk. Management and Delivery recommendations according to recommendations as outlined in [address], and ACOG Committee Opinion [id number], both updated 2019. Recommendations   Treatment of chronic HTN is recommended to maintain blood pressure in normal to mild range (<140/90). Low dose Aspirin (162 mg daily qhs) is recommended to be started by 12-16 weeks for the prevention of preeclampsia in high risk women; some benefit seen with starting up to 28 weeks. Recommend serial growth ultrasounds in third trimester, as well as  testing to monitor maternal and fetal well-being  Delivery timing for chronic hypertension not on meds is 38-39 weeks, all timing adjusted based on maternal and fetal condition. Close monitoring of blood pressure for first 2 weeks postpartum, consider home blood pressure monitoring, medication to maintain <150/100. ([facility] preeclampsia monitoring program continues through 14 days pp.)  Recommend in office BP check day 5-7 after delivery. Recommend 20mg PO lasix x 5 days beginning PPD #1    2) Prior child with ONTD,  demise.    Reassuring early anatomy  Pt anxious re recurrence  No genetic etiology, possible vascular event in early development. Will monitor closely. 3) Obesity in Pregnancy  Preconception BMI ? 30 increases risk for pregnancy complications, including gestational diabetes, poor or accelerated fetal growth, hypertensive disorders of pregnancy, and abnormal labor progression. In addition, there is an increased risk of fetal demise, as well as congenital anomalies including neural tube defects, cardiac malformations, orofacial defects, and limb eduction abnormalities. The risk for stillbirth increases with increasing obesity: class I obesity 1.3 [1.2-1.4], class II obesity 1.4 [1.3-1.6], class III obesity 1.9 [1.3-1.6]) and higher stillbirth risk in  obese women (1.9 [1.7-2.1]) than in  obese women (1.4 [1.3-1.5]). Among women with class III obesity (BMI ?40 kg/m2), the risk for stillbirth increased with advancing gestational age: 27 to 29 weeks, hazard and risk ratios 1.40 and 1.69, respectively; 37 to 39 weeks, hazard and risk ratios 3.20 and 2.95, respectively; and 40 to 42 weeks, hazard and risk ratios 3.30 and 8.95, respectively. Recommend level II ultrasound for anatomy and fetal echo if diagnosis of obesity. Consider  testing beginning at 32-36 weeks due to risks of fetal demise, timing dependent on maternal and fetal comorbidities. Early evaluation of glucose resistance with HgbA1c at intitation of care- if a1c > 5.5, then follow up with either \"2 step\" 1hr GCT/ 3hr GTT OR \"1 step\" 2hr GTT  Closely monitor blood pressure for development/worsening of hypertensive disorders of pregnancy. Weight Gain Goal: <15 pounds, it is ok to stay same weight or lose as long as baby growing well  Dietary choices- low carb fine, avoid full-bore keto (goal >50-75gm carb/day); not to use intermittent/prolonged fasting without specific discussion with physician.    Continue activity/exercise     Patient counseled re need to optimize both maternal and fetal health by remaining active, limiting weight gain, and modifying food choices. She understands that if obesity worsens, then will need to meet with anesthesia and as a team determine safest location for delivery. Addressed normal pregnancy complaints, reassured and offered suggestions for care  Reviewed gestational age precautions and activity goals/limitations  Nutritional counseling as well as specific goals based on current maternal and fetal status  Options for GERD therapy if becomes symptomatic over course of pregnancy  Gestational age appropriate preventive care regarding communicable disease transmission and vaccines as appropriate (including flu, TDaP, and COVID.)  Additional plans and concerns as documented in problem list.   All questions answered and concerns discussed. I have spent 48 minutes reviewing previous notes, test results and face to face with the patient discussing the diagnosis and importance of compliance with the treatment plan, in addition to ultrasound findings, as well as documenting on the day of the visit (2022)      Return in 3-4 week(s) early anatomy       An electronic signature was used to authenticate this note.   Adonay Beverly MD    Patient Active Problem List   Diagnosis Code    Supervision of high risk pregnancy in first trimester O09.91    Obesity affecting pregnancy in first trimester O99.211    History of 2  sections Z98.891    History of fetal anomaly in prior pregnancy, currently pregnant O09.299    Pre-existing essential hypertension complicating pregnancy in first trimester O10.011    History of neural tube defect in infant in prior pregnancy, currently pregnant O09.299    Anxiety disorder affecting pregnancy, antepartum O99.340, F41.9     Visit Diagnoses         Codes    BMI 36.0-36.9,adult     Z68.36    13 weeks gestation of pregnancy     Z3A.13    Nuchal translucency of fetus on prenatal ultrasound     Z36.82

## 2022-08-29 NOTE — PROGRESS NOTES
NOB consult with pt. Labs today: none. Offered pt the option of CF, SMA, and Fragile X carrier testing. Pt declines testing. Offered pt the option of genetic screening. Pt has NT today at [facility]. Will decide on NIPT after NT. Instructed pt on exercise/nutrition in pregnancy. Reviewed [facility] preg book. Advised pt on using [facility] for hospital needs and [facility] L&D for pregnancy related emergencies. Pt states understanding. NOB forms signed, scanned, and given to pt. Medical Hx: abnormal pap, adenomyosis, anemia, asthma, CHTN, endometriosis, migraine, endometriosis, frequent sinus infections, pt's 1st baby born with multiple fetal anomalies.  at 2 month old. Surgical Hx: c/s ( 2018, 2018), pelvic lap, wisdom teeth      Last Pap: 22 WNL    Pt OB c/o: pt does not want to do early 2hr gtt d/t glucola \"making me sick. \" Is seeing [facility] today and can discuss if there any alternatives.

## 2022-09-15 ENCOUNTER — ROUTINE PRENATAL (OUTPATIENT)
Dept: OBGYN CLINIC | Age: 32
End: 2022-09-15

## 2022-09-15 VITALS — SYSTOLIC BLOOD PRESSURE: 125 MMHG | WEIGHT: 218 LBS | DIASTOLIC BLOOD PRESSURE: 78 MMHG | BODY MASS INDEX: 36.84 KG/M2

## 2022-09-15 DIAGNOSIS — O09.299 HISTORY OF NEURAL TUBE DEFECT IN INFANT IN PRIOR PREGNANCY, CURRENTLY PREGNANT: ICD-10-CM

## 2022-09-15 DIAGNOSIS — R00.2 HEART PALPITATIONS: ICD-10-CM

## 2022-09-15 DIAGNOSIS — Z67.91 RH NEGATIVE STATUS DURING PREGNANCY IN THIRD TRIMESTER: ICD-10-CM

## 2022-09-15 DIAGNOSIS — O26.893 RH NEGATIVE STATUS DURING PREGNANCY IN THIRD TRIMESTER: ICD-10-CM

## 2022-09-15 DIAGNOSIS — Z98.891 HISTORY OF 2 CESAREAN SECTIONS: ICD-10-CM

## 2022-09-15 DIAGNOSIS — O09.299 HISTORY OF FETAL ANOMALY IN PRIOR PREGNANCY, CURRENTLY PREGNANT: ICD-10-CM

## 2022-09-15 DIAGNOSIS — Z11.3 SCREEN FOR STD (SEXUALLY TRANSMITTED DISEASE): ICD-10-CM

## 2022-09-15 DIAGNOSIS — O10.011 PRE-EXISTING ESSENTIAL HYPERTENSION COMPLICATING PREGNANCY IN FIRST TRIMESTER: ICD-10-CM

## 2022-09-15 DIAGNOSIS — Z28.9 COVID-19 VACCINE SERIES NOT COMPLETED: ICD-10-CM

## 2022-09-15 DIAGNOSIS — Z11.3 SCREENING FOR STDS (SEXUALLY TRANSMITTED DISEASES): ICD-10-CM

## 2022-09-15 DIAGNOSIS — F41.9 ANXIETY DISORDER AFFECTING PREGNANCY, ANTEPARTUM: ICD-10-CM

## 2022-09-15 DIAGNOSIS — O09.92 SUPERVISION OF HIGH RISK PREGNANCY IN SECOND TRIMESTER: Primary | ICD-10-CM

## 2022-09-15 DIAGNOSIS — O99.211 OBESITY AFFECTING PREGNANCY IN FIRST TRIMESTER: ICD-10-CM

## 2022-09-15 DIAGNOSIS — Z28.311 COVID-19 VACCINE SERIES NOT COMPLETED: ICD-10-CM

## 2022-09-15 DIAGNOSIS — O99.340 ANXIETY DISORDER AFFECTING PREGNANCY, ANTEPARTUM: ICD-10-CM

## 2022-09-15 PROCEDURE — 99902 PR PRENATAL VISIT: CPT | Performed by: OBSTETRICS & GYNECOLOGY

## 2022-09-15 NOTE — PROGRESS NOTES
[facility] 16w0d by d=10wk US    Denies LOF, VB. Here today w/ her mom     Last Pap: 2022--neg cytology        Hx of Abnl Paps:  LGSIL ; no  Hx LEEP/CKC  Wnl thereafter     Hx STDs:  Hx Chlamydia        STD cxs today on urine__        Mild n/v --- better w/ B6/unisom   no vb/cramping        C/o heart palpitations-- has apple watch and has been as high as in the 180s on occasion (reviewed watch and has had several episodes of 140's +)   Denies having this prior to pregnancy  Denies any assoc CP/SOB, calf pain/swelling   Some anxiety but denies anything excessive   Denies any excessive caffeine intake  Dose use Cocoa butter daily  Discussed CV changes in pregnancy and progesterone effect on smooth mm   Will refer to cards for eval, possible Holter monitor/Echo           Has had COVID 19 infxn -- 2021  Has NOT been COVID 19 vaccinated --counseled and medical websites given for reference       22 at [facility]: Normal NT and nasal bone. Genetic counseling done by MD. [facility].      F/U MFM @ 17 weeks early anatomy/viability          OB hx:  G1:  LTCS w/ G1 at 38w4d w/ [facility]HS on 2018 due to BPD estimated 10.5cm, multiple fetal anomalies, no diagnosis ever made (8#2.9) --subsequent  demise at approx 1 mo age     G4:   Repeat LTCS at 39w0d w/ Dr. Elvis Garcia on 18 (7#1.1)                   CHTN  Diagnosed  in ER w/ \"stroke range bps\" per pt report  No longer taking Norvasc --pt self Dc'd around  and no meds since  No hx GISELA w/ G1/G2   Bp wnl today  Baseline HELLP labs within normal limits  Baseline PC ratio 0.1    Last EKG wnl 2018   TSH wnl   ASA 162mg recommended            Hx baby with fetal anomaly/NTD:  G1:  LTCS w/ G1 at 38w4d w/ GHS on 2018 due to BPD estimated 10.5cm, multiple fetal anomalies, no diagnosis ever made (8#2.9) --subsequent  demise at approx 1 mo age     (increased risk for NTD w/ future babies-->needs extra folic acid until end of 1st trimester (rx Folate 4mg every day)              Hx LTCS x2:   Op note from Dr. Delmi Rainey reviewed: \"adhesions, significant bleeding during case, poor exposure due to obesity/adhesions\";  \"very difficult visualization due to obesity/adhesions. Consider vertical midline skin incision if has another c/s\"     Pt stated at last visit was considering TOLAC--counseled extensively and will continue to do so    9/15/22:  discussed, again, w/ pt her hx and last op note. Would not at all attempt  due to significant increased risks to both her and baby and would recommend referral to [facility] for delivery if she were insistent on TOLAC. After extensive counseling pt agreeable to repeat c/s HOWEVER, states does NOT want a vertical midline incision. Pt counseled that in a life/death situation I cannot promise that a vertical incision may not be necessary. Pt voices understanding             BMI 37 (pregravid):  Hgb A1C 5.9   No hx GDM   States wants a more \"natural\" option then Glucola--counseled on option of checking fsbg       States wants a more \"natural\" option then Glucola--counseled on option of checking fsbg  --> no hx of GDM, will plan routine timing of glycemic control at 24-28wk with glucose checking (2022 [facility])    Discussed watching carb intake very closely. Rh neg:  RhoGAM 28 weeks           Anxiety:   22 at [facility]: Pt reports increase anxiety being in our office due to hx of anomalies in previous pregnancy 4 years ago. Reports feeling \"down\" and laying around at home. Feeling \"extra irritable\". Discussed starting SSRI.     Start Zoloft 50 mg po daily as desired  9/15:  stable

## 2022-09-18 LAB
C TRACH RRNA SPEC QL NAA+PROBE: NEGATIVE
N GONORRHOEA RRNA SPEC QL NAA+PROBE: NEGATIVE
SPECIMEN SOURCE: NORMAL
T VAGINALIS RRNA SPEC QL NAA+PROBE: NEGATIVE

## 2022-09-24 PROBLEM — O10.012 PRE-EXISTING ESSENTIAL HYPERTENSION AFFECTING PREGNANCY IN SECOND TRIMESTER: Status: ACTIVE | Noted: 2017-07-11

## 2022-09-24 PROBLEM — O99.212 OBESITY AFFECTING PREGNANCY IN SECOND TRIMESTER: Status: ACTIVE | Noted: 2017-07-11

## 2022-09-24 PROBLEM — O09.292: Status: ACTIVE | Noted: 2018-05-28

## 2022-09-24 PROBLEM — O99.342 ANXIETY DURING PREGNANCY IN SECOND TRIMESTER, ANTEPARTUM: Status: ACTIVE | Noted: 2022-08-29

## 2022-09-24 PROBLEM — O09.92 SUPERVISION OF HIGH RISK PREGNANCY IN SECOND TRIMESTER: Status: ACTIVE | Noted: 2017-08-15

## 2022-09-26 ENCOUNTER — ROUTINE PRENATAL (OUTPATIENT)
Dept: OBGYN CLINIC | Age: 32
End: 2022-09-26
Payer: COMMERCIAL

## 2022-09-26 VITALS — DIASTOLIC BLOOD PRESSURE: 70 MMHG | SYSTOLIC BLOOD PRESSURE: 119 MMHG

## 2022-09-26 DIAGNOSIS — O10.012 PRE-EXISTING ESSENTIAL HYPERTENSION AFFECTING PREGNANCY IN SECOND TRIMESTER: ICD-10-CM

## 2022-09-26 DIAGNOSIS — Z3A.17 17 WEEKS GESTATION OF PREGNANCY: ICD-10-CM

## 2022-09-26 DIAGNOSIS — F41.9 ANXIETY DURING PREGNANCY IN SECOND TRIMESTER, ANTEPARTUM: ICD-10-CM

## 2022-09-26 DIAGNOSIS — O09.92 SUPERVISION OF HIGH RISK PREGNANCY IN SECOND TRIMESTER: ICD-10-CM

## 2022-09-26 DIAGNOSIS — O99.342 ANXIETY DURING PREGNANCY IN SECOND TRIMESTER, ANTEPARTUM: ICD-10-CM

## 2022-09-26 DIAGNOSIS — O99.212 OBESITY AFFECTING PREGNANCY IN SECOND TRIMESTER: ICD-10-CM

## 2022-09-26 DIAGNOSIS — O09.292 HISTORY OF FETAL ANOMALY IN PRIOR PREGNANCY, CURRENTLY PREGNANT, SECOND TRIMESTER: Primary | ICD-10-CM

## 2022-09-26 PROCEDURE — 76801 OB US < 14 WKS SINGLE FETUS: CPT | Performed by: OBSTETRICS & GYNECOLOGY

## 2022-09-26 PROCEDURE — 99214 OFFICE O/P EST MOD 30 MIN: CPT | Performed by: OBSTETRICS & GYNECOLOGY

## 2022-09-26 ASSESSMENT — PATIENT HEALTH QUESTIONNAIRE - PHQ9
SUM OF ALL RESPONSES TO PHQ QUESTIONS 1-9: 1
2. FEELING DOWN, DEPRESSED OR HOPELESS: 1
SUM OF ALL RESPONSES TO PHQ QUESTIONS 1-9: 1
1. LITTLE INTEREST OR PLEASURE IN DOING THINGS: 0
SUM OF ALL RESPONSES TO PHQ9 QUESTIONS 1 & 2: 1
SUM OF ALL RESPONSES TO PHQ QUESTIONS 1-9: 1
SUM OF ALL RESPONSES TO PHQ QUESTIONS 1-9: 1

## 2022-09-26 NOTE — PROGRESS NOTES
MFM Consultation    Elijah Byrd (: 1990) is a 28 y.o. Tunde Merritts at 17w4d with 3/2/2023, Alternate DENNISE Entry. Presents for evaluation of the following chief complaint(s):  Pregnancy Ultrasound and High Risk Pregnancy (Obesity, Hx  demise secondary to ONTD/multiple anomalies)     Patient is working full time (40 hours/week) at [employer] in [place] [facility]. Scheduled to see Primary OB [facility]) on 9/15/22. Pt having difficulty with missing work for OB/MFM appts. Requests intermittent FMLA for visits as she will require visits throughout pregnancy due to high risk nature of her pregnancy     No HAs, edema. Denies preeclamptic symptoms. No bleeding, LOF, cramping, ctxs, or vaginal pressure. Interval history since prior appt reviewed and updated as indicated. Review of Systems - per HPI; otherwise unremarkable. Exam:     Vitals:    22 1147   BP: 119/70     Ultrasound: Please see formal ultrasound report under imaging tab. ASSESSMENT/PLAN:  Patient Active Problem List    Diagnosis Date Noted    Supervision of high risk pregnancy in second trimester 08/15/2017     Priority: High     OB hx:  G1:  LTCS w/ G1 at 38w4d w/ [facility] on 2018 due to BPD estimated 10.5cm, multiple fetal anomalies, no diagnosis ever made (8#2.9) --subsequent  demise at approx 1 mo age  G4:   Repeat LTCS at 39w0d w/ Dr. Devona Holter on 18 (7#1.1)  22 at [facility]: Normal NT and nasal bone. Genetic counseling done by MD. [facility].   22 at [facility]: Normal early anatomy; AC 54%  F/U MFM 3-4 weeks anatomy completion  Low dose Aspirin 162  mg daily is recommended to be started at 12-16 weeks (some benefit seen with starting up to 28 weeks) for the prevention of preeclampsia  in high risk women.  ([place] Preventative Services Task Force, Annals of Internal Medicine 2015)    Start Vitamin D 2000IU daily and Calcium 1000mg daily (or may take Viactiv calcium chews x 2 per day) to aid in protection of bones and teeth. Heart palpitations 09/15/2022     Priority: Medium     9/15/22  C/o heart palpitations-- has apple watch and has been as high as in the 180s on occasion (reviewed watch and has had several episodes of 140's +)   Denies having this prior to pregnancy  Denies any assoc CP/SOB, calf pain/swelling   Some anxiety but denies anything excessive   Denies any excessive caffeine intake  Dose use Cocoa butter daily  Discussed CV changes in pregnancy and progesterone effect on smooth mm   Will refer to cards for eval, possible Holter monitor/Echo       Anxiety during pregnancy in second trimester, antepartum 2022     Priority: Medium     22 at [facility]: Pt reports increase anxiety being in our office due to hx of anomalies in previous pregnancy 4 years ago. Reports feeling \"down\" and laying around at home. Feeling \"extra irritable\".  Discussed starting SSRI.    22 at [facility]: Reports stable mood  Start Zoloft 50 mg po daily as desired      Obesity affecting pregnancy in second trimester 2017     Priority: Medium     Pregravid BMI 37  No history of gestational diabetes  Hemoglobin A1c elevated at 5.9--> needs early 2-hour glucose tolerance test at 16-18wks  Please schedule  States wants a more \"natural\" option then Glucola--counseled on option of checking fsbg  --> no hx of GDM, will plan routine timing of glycemic control at 24-28wk with glucose checking (2022 [facility])      Pre-existing essential hypertension affecting pregnancy in second trimester 2017     Priority: Medium      Diagnosed  in ER w/ \"stroke range bps\" per pt report  No longer taking Norvasc --pt self Dc'd around  and no meds since  No hx GISELA w/ G1/G2   Bp wnl on preg intake  Baseline HELLP labs within normal limits  Baseline PC ratio 0.1   TSH wnl  Last EKG wnl 2018   ASA 162mg recommended  22 at [facility]: /82  22 at [facility]: BP WNL      History of 2  sections 2018     Priority: Low     Hx LTCS x2:   G1:  LTCS w/ G1 at 38w4d w/ GHS on 2018 due to BPD estimated 10.5cm, multiple fetal anomalies, no diagnosis ever made (8#2.9) --subsequent  demise at approx 1 mo age    G4:   Repeat LTCS at 39w0d w/ Dr. Lindsay Garcia on 18 (7#1.1)    *Op note from Dr. Lindsay Garcia reviewed: \"adhesions, significant bleeding during case, poor exposure due to obesity/adhesions\";  \"very difficult visualization due to obesity/adhesions. Consider vertical midline skin incision if has another c/s\"    Pt states now considering TOLAC--counseled extensively and will continue to do so    9/15/22:  discussed, again, w/ pt her hx and last op note. Would not at all attempt  due to significant increased risks to both her and baby and would recommend referral to [facility] for delivery if she were insistent on TOLAC. After extensive counseling pt agreeable to repeat c/s HOWEVER, states does NOT want a vertical midline incision. Pt counseled that in a life/death situation I cannot promise that a vertical incision may not be necessary. Pt voices understanding         History of fetal anomaly in prior pregnancy, currently pregnant, second trimester 2018     Priority: Low       G1:  LTCS w/ G1 at 38w4d w/ GHS on 2018 due to BPD estimated 10.5cm, multiple fetal anomalies, no diagnosis ever made (8#2.9) --subsequent  demise at approx 1 mo age    (increased risk for NTD w/ future babies-->needs extra folic acid until end of 1st trimester (rx Folate 4mg every day)        History of neural tube defect in infant in prior pregnancy, currently pregnant 2018     Priority: Low      See Hx of Fetal Anomaly Overview         Patient has Chronic HTN. BP stable today. Patient is currently not taking medications for blood pressure control.  .   Delivery timing for Delivery timing for chronic hypertension not on meds is 38-39 weeks, all timing adjusted based on maternal and fetal condition. Recommend adding or changing medication prn to keep SBP < 140, DBP < 90. Low dose Aspirin (162 mg daily) daily for the prevention of preeclampsia in high risk women. Recommend serial growth ultrasounds in third trimester, as well as  testing for maternal and fetal well-being surveillance. Close monitoring of BP through 2 weeks postpartum to maintain <150/100. Recommend in office BP check day 5-7 after delivery. Recommend 20mg PO lasix x 5 days beginning PPD #1. Hx ONTD- reassuring anatomy today       Addressed normal pregnancy complaints, reassured and offered suggestions for care  Reviewed gestational age precautions and activity goals/limitations  Nutritional counseling as well as specific goals based on current maternal and fetal status  Options for GERD therapy if becomes symptomatic over course of pregnancy  Gestational age appropriate preventive care regarding communicable disease transmission and vaccines as appropriate (including flu, TDaP, and COVID.)  Additional plans and concerns as documented in problem list.   All questions answered and concerns discussed. An electronic signature was used to authenticate this note. Lanette Kapoor MD    I have spent 32 minutes reviewing previous notes, test results and face to face with the patient discussing the diagnosis and importance of compliance with the treatment plan, in addition to ultrasound findings, as well as documenting on the day of the visit (2022). Return in 4 weeks (on 10/24/2022) for anatomy completion, echo.       Patient Active Problem List   Diagnosis Code    Supervision of high risk pregnancy in second trimester O09.92    Obesity affecting pregnancy in second trimester O99.212    History of 2  sections Z98.891    History of fetal anomaly in prior pregnancy, currently pregnant, second trimester O09.292    Pre-existing essential hypertension affecting pregnancy in second trimester O10.012 History of neural tube defect in infant in prior pregnancy, currently pregnant O09.299    Anxiety during pregnancy in second trimester, antepartum O99.342, F41.9    Heart palpitations R00.2     Visit Diagnoses         Codes    BMI 36.0-36.9,adult     Z68.36    17 weeks gestation of pregnancy     Z3A.17

## 2022-09-27 ENCOUNTER — TELEPHONE (OUTPATIENT)
Dept: OBGYN CLINIC | Age: 32
End: 2022-09-27

## 2022-09-27 NOTE — TELEPHONE ENCOUNTER
Pt called with concerns regarding FMLA. Pt request for us to put appointments with MFM on paperwork as well. Notified pt I would update forms and send them via [employer]. Pt voiced understanding.

## 2022-10-13 ENCOUNTER — TELEPHONE (OUTPATIENT)
Dept: OBGYN CLINIC | Age: 32
End: 2022-10-13

## 2022-10-13 NOTE — LETTER
[address]  [address] [place] [id number]  Phone: [phone]  Fax: [phone]    Lucio Jeter RN        October 13, 9882     Kathy Byrd  [address] [place] [address]      Dear Billie Tillman: We missed seeing you for a scheduled appointment at [address] with [facility] on 10/12/22. We're sorry you were unable to keep your appointment and hope that you are doing well. We ask that you please call 24 hours in advance if you are unable to make your appointment, so that we can give that time to another patient in need. We care about you and the management of your healthcare and want to make sure that you follow up as recommended. To provide quality care and timely appointments to all our patients, you may be dismissed from the practice if you do not show for three (3) scheduled appointments within a 12-month period. We would like to continue treating your healthcare needs. Please call the office to reschedule your appointment, if needed.      Sincerely,        Lucio Jeter RN

## 2022-10-13 NOTE — TELEPHONE ENCOUNTER
Pt was a no show on 10/12/22 for LEXII with . Pt rescheduled for 10/18/22. No show letter sent via [email or web address].

## 2022-10-17 PROBLEM — N73.6 PELVIC ADHESIVE DISEASE: Status: ACTIVE | Noted: 2022-10-17

## 2022-10-17 PROBLEM — R00.0 TACHYCARDIA: Status: ACTIVE | Noted: 2022-10-17

## 2022-10-18 ENCOUNTER — ROUTINE PRENATAL (OUTPATIENT)
Dept: OBGYN CLINIC | Age: 32
End: 2022-10-18

## 2022-10-18 VITALS — SYSTOLIC BLOOD PRESSURE: 124 MMHG | WEIGHT: 219 LBS | DIASTOLIC BLOOD PRESSURE: 78 MMHG | BODY MASS INDEX: 37.01 KG/M2

## 2022-10-18 DIAGNOSIS — O99.342 ANXIETY DURING PREGNANCY IN SECOND TRIMESTER, ANTEPARTUM: ICD-10-CM

## 2022-10-18 DIAGNOSIS — Z98.891 HISTORY OF 2 CESAREAN SECTIONS: ICD-10-CM

## 2022-10-18 DIAGNOSIS — O09.92 SUPERVISION OF HIGH RISK PREGNANCY IN SECOND TRIMESTER: Primary | ICD-10-CM

## 2022-10-18 DIAGNOSIS — O09.299 HISTORY OF NEURAL TUBE DEFECT IN INFANT IN PRIOR PREGNANCY, CURRENTLY PREGNANT: ICD-10-CM

## 2022-10-18 DIAGNOSIS — R00.2 HEART PALPITATIONS: ICD-10-CM

## 2022-10-18 DIAGNOSIS — O09.292 HISTORY OF FETAL ANOMALY IN PRIOR PREGNANCY, CURRENTLY PREGNANT, SECOND TRIMESTER: ICD-10-CM

## 2022-10-18 DIAGNOSIS — F41.9 ANXIETY DURING PREGNANCY IN SECOND TRIMESTER, ANTEPARTUM: ICD-10-CM

## 2022-10-18 DIAGNOSIS — R00.2 PALPITATIONS: ICD-10-CM

## 2022-10-18 DIAGNOSIS — O99.212 OBESITY AFFECTING PREGNANCY IN SECOND TRIMESTER: ICD-10-CM

## 2022-10-18 DIAGNOSIS — N73.6 PELVIC ADHESIVE DISEASE: ICD-10-CM

## 2022-10-18 DIAGNOSIS — R00.0 TACHYCARDIA: ICD-10-CM

## 2022-10-18 DIAGNOSIS — O10.012 PRE-EXISTING ESSENTIAL HYPERTENSION AFFECTING PREGNANCY IN SECOND TRIMESTER: ICD-10-CM

## 2022-10-18 PROCEDURE — 99902 PR PRENATAL VISIT: CPT | Performed by: OBSTETRICS & GYNECOLOGY

## 2022-10-18 NOTE — PROGRESS NOTES
KEYSHAWN  20w5d  Denies LOF, VB, Ctxs. Good FM.       22 at [facility]: Normal NT and nasal bone. Genetic counseling done by MD. [facility]. 22 at [facility]: Normal early anatomy; AC 54%    Has FU w/ MFM on 10/25/22         OB hx:  G1:  LTCS w/ G1 at 38w4d w/ GHS on 2018 due to BPD estimated 10.5cm, multiple fetal anomalies, NTD; no diagnosis ever made (8#2.9) --subsequent  demise at approx 1 mo age     G4:   Repeat LTCS at 39w0d w/ Dr. Omari Augustine on 18 (7#1.1)                   CHTN  Diagnosed  in ER w/ \"stroke range bps\" per pt report  No longer taking Norvasc --pt self Dc'd around  and no meds since  No hx GISELA w/ G1/G2   Bp wnl today  Baseline HELLP labs within normal limits  Baseline PC ratio 0.1    Last EKG wnl 2018   TSH wnl   ASA 162mg recommended                  Hx LTCS x2:   Op note from Dr. Omari Augustine reviewed: \"adhesions, significant bleeding during case, poor exposure due to obesity/adhesions\";  \"very difficult visualization due to obesity/adhesions. Consider vertical midline skin incision if has another c/s\"     Pt   was considering TOLAC--counseled extensively and will continue to do so    9/15/22:  discussed, again, w/ pt her hx and last op note. Would not at all attempt  due to significant increased risks to both her and baby and would recommend referral to [facility] for delivery if she were insistent on TOLAC. After extensive counseling pt agreeable to repeat c/s HOWEVER, states does NOT want a vertical midline incision. Pt counseled that in a life/death situation I cannot promise that a vertical incision may not be necessary.   Pt voices understanding           BMI 37 (pregravid):  Hgb A1C 5.9   No hx GDM   States wants a more \"natural\" option then Glucola--counseled on option of checking fsbg  --> no hx of GDM, will plan routine timing of glycemic control at 24-28wk with glucose checking (2022 [facility])    Discussed watching carb intake very closely. Prefers to do a 2hr 75mg glucola drink off of [employer] at [employer] (pt will buy it herself on their websitea and bring w/ her)                 Rh neg:  RhoGAM 28 weeks           Anxiety:   8/29/22 at [facility]: Pt reports increase anxiety being in our office due to hx of anomalies in previous pregnancy 4 years ago. Reports feeling \"down\" and laying around at home. Feeling \"extra irritable\". Discussed starting SSRI. Start Zoloft 50 mg po daily as desired  9/15:  stable    10/18:  stable w/ out meds currently           Heart palpitations:  has apple watch and has been as high as in the 180s on occasion (reviewed watch and has had several episodes of 140's +)   Denies having this prior to pregnancy  Denies any assoc CP/SOB, calf pain/swelling   Some anxiety but denies anything excessive   Denies any excessive caffeine intake  Dose use Cocoa butter daily  Discussed CV changes in pregnancy and progesterone effect on smooth mm   S/p referral to cards for eval, possible Holter monitor/Echo on 9/15/22-- does not appear referral went through.   Will place again today __

## 2022-10-20 ENCOUNTER — INITIAL CONSULT (OUTPATIENT)
Dept: CARDIOLOGY CLINIC | Age: 32
End: 2022-10-20
Payer: COMMERCIAL

## 2022-10-20 VITALS
BODY MASS INDEX: 36.49 KG/M2 | SYSTOLIC BLOOD PRESSURE: 130 MMHG | HEART RATE: 85 BPM | HEIGHT: 65 IN | DIASTOLIC BLOOD PRESSURE: 86 MMHG | WEIGHT: 219 LBS

## 2022-10-20 DIAGNOSIS — R00.0 TACHYCARDIA: Primary | ICD-10-CM

## 2022-10-20 PROCEDURE — 93000 ELECTROCARDIOGRAM COMPLETE: CPT | Performed by: INTERNAL MEDICINE

## 2022-10-20 PROCEDURE — 99243 OFF/OP CNSLTJ NEW/EST LOW 30: CPT | Performed by: INTERNAL MEDICINE

## 2022-10-20 ASSESSMENT — PATIENT HEALTH QUESTIONNAIRE - PHQ9
2. FEELING DOWN, DEPRESSED OR HOPELESS: 0
SUM OF ALL RESPONSES TO PHQ QUESTIONS 1-9: 0
SUM OF ALL RESPONSES TO PHQ QUESTIONS 1-9: 0
1. LITTLE INTEREST OR PLEASURE IN DOING THINGS: 0
SUM OF ALL RESPONSES TO PHQ QUESTIONS 1-9: 0
SUM OF ALL RESPONSES TO PHQ9 QUESTIONS 1 & 2: 0
SUM OF ALL RESPONSES TO PHQ QUESTIONS 1-9: 0

## 2022-10-20 NOTE — PROGRESS NOTES
[address], [address], [address] [place] [address]  PHONE: [phone]              NAME:  Blas Orr  : 1990  MRN: [id number]      SUBJECTIVE:   Blas Orr is a 28 y.o. female seen for a consultation visit regarding the following:     Chief Complaint   Patient presents with    Consultation    Tachycardia            HPI:    21 weeks gestation. Has had palpitations. Hr to 140s on apple watch. Fast and regular. Rare since changing diet- 10-15 minutes. ASSOCIATED WITH SOB. No cp or mosquera. No orthopnea or pnd. No syncope. Allergies   Allergen Reactions    Latex Rash    Hydromorphone Itching     Dilaudid     Past Medical History:   Diagnosis Date    Abnormal Papanicolaou smear of cervix     Adenomyosis 2022    Added automatically from request for surgery [id number]    Anemia     oral Fe    Asthma     as a child    COVID-19 vaccine series not completed 2022    Has had COVID 19 infxn -- 2021 Has NOT been COVID 19 vaccinated --counseled and medical websites given for reference    Depression     Endometriosis 2017: dx lsc w/ Óscar POSTOPERATIVE DIAGNOSES  1. Stage IV endometriosis with involvement of the bladder flap  and adhesions to the anterior lower fundus. 2. Adhesions of the colon to the posterior lower uterine segment. 3. Bilateral adhesions of the ovaries to the pelvic side walls. 4. Endometrioma of the left ovary. Frequent sinus infections     Heart palpitations     Hypertension     hx of; controlled with diet; no meds    Intramural leiomyoma of uterus 2017    LGSIL Pap smear of vagina     Migraine without aura, not intractable 2017    Morbid obesity ([address]     BMI-35     Past Surgical History:   Procedure Laterality Date     SECTION  02/2018 x2    elecitve c/s --multiple fetal anomalies, bpd estimated 10.5cm.     COLPOSCOPY      GYN  2017    laparoscopy with CO2 laser of endometriosis, lysis of adhesions, ovarian cystectomy and chromotubastion     WISDOM TOOTH EXTRACTION       Family History   Problem Relation Age of Onset    Colon Cancer Neg Hx     Depression Other     Diabetes Maternal Grandmother     Hypertension Maternal Grandmother     Diabetes Paternal Grandmother     Breast Cancer Neg Hx     No Known Problems Father     No Known Problems Mother     Hypertension Paternal Grandmother     Ovarian Cancer Neg Hx      Social History     Tobacco Use    Smoking status: Never    Smokeless tobacco: Never   Substance Use Topics    Alcohol use: No     Alcohol/week: 1.0 standard drink           ROS:    Constitution: Negative for fever. Eyes: Negative for blurred vision. Respiratory: Negative for cough. Endocrine: Negative for cold intolerance and heat intolerance. Skin: Negative for rash. Musculoskeletal: Negative for myalgias. Gastrointestinal: Negative for diarrhea, nausea and vomiting. Genitourinary: Negative for dysuria. Neurological: Negative for headaches and numbness. PHYSICAL EXAM:     /86   Pulse 85   Ht 5' 4.5\" (1.638 m)   Wt 219 lb (99.3 kg)   LMP 05/25/2022   BMI 37.01 kg/m²    Constitutional: Oriented to person, place, and time. Appears well-developed and well-nourished. Head: Normocephalic and atraumatic. Neck: Neck supple. Cardiovascular: Normal rate and regular rhythm with no murmur -No JVP  Pulmonary/Chest: Breath sounds normal.   Abdominal: Soft. Musculoskeletal: No edema. Neurological: Alert and oriented to person, place, and time. Skin: Skin is warm and dry. Psychiatric: Normal mood and affect. Vitals reviewed        Medical problems and test results were reviewed with the patient today.      Wt Readings from Last 3 Encounters:   10/20/22 219 lb (99.3 kg)   10/18/22 219 lb (99.3 kg)   09/15/22 218 lb (98.9 kg)    Ekg-Sinus  Rhythm  -Short VA syndrome   no st changes  hr 85         No results found for this or any previous visit (from the past 672 hour(s)). No results found for: CHOL, CHOLPOCT, CHOLX, CHLST, CHOLV, HDL, HDLPOC, HDLC, LDL, LDLC, VLDLC, VLDL, TGLX, TRIGL      ASSESSMENT and PLAN      Palpitations with pregnancy improved- Holter and echo          Thank you for allowing me to participate in this patient's care. Please call or contact me if there are any questions or concerns regarding the above.       Pratik Valerio MD  10/20/22  9:14 AM

## 2022-10-25 ENCOUNTER — ROUTINE PRENATAL (OUTPATIENT)
Dept: OBGYN CLINIC | Age: 32
End: 2022-10-25
Payer: COMMERCIAL

## 2022-10-25 VITALS — SYSTOLIC BLOOD PRESSURE: 117 MMHG | HEART RATE: 90 BPM | DIASTOLIC BLOOD PRESSURE: 74 MMHG

## 2022-10-25 DIAGNOSIS — O99.212 OBESITY AFFECTING PREGNANCY IN SECOND TRIMESTER: ICD-10-CM

## 2022-10-25 DIAGNOSIS — F41.9 ANXIETY DURING PREGNANCY IN SECOND TRIMESTER, ANTEPARTUM: ICD-10-CM

## 2022-10-25 DIAGNOSIS — O99.342 ANXIETY DURING PREGNANCY IN SECOND TRIMESTER, ANTEPARTUM: ICD-10-CM

## 2022-10-25 DIAGNOSIS — O09.292 HISTORY OF FETAL ANOMALY IN PRIOR PREGNANCY, CURRENTLY PREGNANT, SECOND TRIMESTER: ICD-10-CM

## 2022-10-25 DIAGNOSIS — O09.92 SUPERVISION OF HIGH RISK PREGNANCY IN SECOND TRIMESTER: ICD-10-CM

## 2022-10-25 DIAGNOSIS — R00.2 HEART PALPITATIONS: ICD-10-CM

## 2022-10-25 DIAGNOSIS — Z3A.21 21 WEEKS GESTATION OF PREGNANCY: Primary | ICD-10-CM

## 2022-10-25 DIAGNOSIS — O10.012 PRE-EXISTING ESSENTIAL HYPERTENSION AFFECTING PREGNANCY IN SECOND TRIMESTER: ICD-10-CM

## 2022-10-25 PROCEDURE — 99214 OFFICE O/P EST MOD 30 MIN: CPT | Performed by: OBSTETRICS & GYNECOLOGY

## 2022-10-25 PROCEDURE — 93325 DOPPLER ECHO COLOR FLOW MAPG: CPT | Performed by: OBSTETRICS & GYNECOLOGY

## 2022-10-25 PROCEDURE — 76825 ECHO EXAM OF FETAL HEART: CPT | Performed by: OBSTETRICS & GYNECOLOGY

## 2022-10-25 PROCEDURE — 76811 OB US DETAILED SNGL FETUS: CPT | Performed by: OBSTETRICS & GYNECOLOGY

## 2022-10-25 ASSESSMENT — PATIENT HEALTH QUESTIONNAIRE - PHQ9
1. LITTLE INTEREST OR PLEASURE IN DOING THINGS: 1
SUM OF ALL RESPONSES TO PHQ9 QUESTIONS 1 & 2: 2
SUM OF ALL RESPONSES TO PHQ QUESTIONS 1-9: 2
2. FEELING DOWN, DEPRESSED OR HOPELESS: 1
SUM OF ALL RESPONSES TO PHQ QUESTIONS 1-9: 2

## 2022-10-25 NOTE — PROGRESS NOTES
MFM Consultation    Fe Byrd (: 1990) is a 28 y.o. Desiree Lime at 21w5d with 3/2/2023, Alternate DENNISE Entry. Presents for evaluation of the following chief complaint(s):  Pregnancy Ultrasound and High Risk Pregnancy (Obesity, Hx  demise secondary to ONTD/multiple anomalies)     Patient is working full time (40 hours/week) at [employer] in [employer]. Scheduled to see Primary OB [facility]) on 22. Pt having difficulty with missing work for OB/MFM appts. Requests intermittent FMLA for visits as she will require visits throughout pregnancy due to high risk nature of her pregnancy- appropriate request.     Pt notes life stressors. Not currently on medication, does not desire any. Palpitations have improved. Completed 3-day Holter monitor, sees cardiology in 2 weeks. No HAs, edema. Denies preeclamptic symptoms. No bleeding, LOF, cramping, ctxs, or vaginal pressure. Interval history since prior appt reviewed and updated as indicated. Review of Systems - per HPI; otherwise unremarkable. Exam:     Vitals:    10/25/22 1558   BP: 117/74   Pulse: 90     Ultrasound: Please see formal ultrasound report under imaging tab. ASSESSMENT/PLAN:  Patient Active Problem List    Diagnosis Date Noted    Supervision of high risk pregnancy in second trimester 08/15/2017     Priority: High     OB hx:  G1:  LTCS w/ G1 at 38w4d w/ GHS on 2018 due to BPD estimated 10.5cm, multiple fetal anomalies, no diagnosis ever made (8#2.9) --subsequent  demise at approx 1 mo age  G4:   Repeat LTCS at 39w0d w/ Dr. Vinod Bashir on 18 (7#1.1)  22 at [facility]: Normal NT and nasal bone. Genetic counseling done by MD. [facility].   22 at [facility]: Normal early anatomy; AC 54%  10/25/22 at [facility]: Normal anatomy and echo; AC 31%, EVIE 16.8cm, CL 4.5    F/U MFM 3-4 weeks   Low dose Aspirin 162  mg daily is recommended to be started at 12-16 weeks (some benefit seen with starting up to 28 weeks) for the prevention of preeclampsia  in high risk women.  (U.S. Preventative Services Task Force, Annals of Internal Medicine 2015)  Start Vitamin D 2000IU daily and Calcium 1000mg daily (or may take Viactiv calcium chews x 2 per day) to aid in protection of bones and teeth. Pelvic adhesive disease 10/17/2022     Priority: Medium    Heart palpitations 09/15/2022     Priority: Medium     9/15/22  C/o heart palpitations-- has apple watch and has been as high as in the 180s on occasion (reviewed watch and has had several episodes of 140's +)   Denies having this prior to pregnancy  Denies any assoc CP/SOB, calf pain/swelling   Some anxiety but denies anything excessive   Denies any excessive caffeine intake  Dose use Cocoa butter daily  Discussed CV changes in pregnancy and progesterone effect on smooth mm   Will refer to cards for eval, possible Holter monitor/Echo   10/25/22 at [facility]: Saw cardiology last week, holter monitor for 3 days. EKG scheduled for 11/11/2022 and then follow up with Dr. Maged Castro at [facility]. Improved since cutting out cocoa butter. Anxiety during pregnancy in second trimester, antepartum 08/29/2022     Priority: Medium     08/29/22 at [facility]: Pt reports increase anxiety being in our office due to hx of anomalies in previous pregnancy 4 years ago. Reports feeling \"down\" and laying around at home. Feeling \"extra irritable\". Discussed starting SSRI.    09/26/22 at [facility]: Reports stable mood  10/25/22 at [facility]: Reports stable mood, discussed Zoloft, declined for now.   Start Zoloft 50 mg po daily as desired      Obesity affecting pregnancy in second trimester 07/11/2017     Priority: Medium     Pregravid BMI 37  No history of gestational diabetes  Hemoglobin A1c elevated at 5.9--> needs early 2-hour glucose tolerance test at 16-18wks  Please schedule  States wants a more \"natural\" option then Glucola--counseled on option of checking fsbg  --> no hx of GDM, will plan routine timing of glycemic control at 24-28wk with glucose checking (2022 [facility])      Prefers to do a 2hr 75mg glucola drink off of [facility] website at 28wks (pt will buy it herself on their websitea and bring w/ her)       Pre-existing essential hypertension affecting pregnancy in second trimester 2017     Priority: Medium      Diagnosed  in ER w/ \"stroke range bps\" per pt report  No longer taking Norvasc --pt self Dc'd around  and no meds since  No hx GISELA w/ G1/G2   Bp wnl on preg intake  Baseline HELLP labs within normal limits  Baseline PC ratio 0.1   TSH wnl  Last EKG wnl 2018   ASA 162mg recommended  22 at [facility]: /82  22 at [facility]: BP WNL  10/25/22 at [facility]: BP /74      Tachycardia 10/17/2022     Priority: Low    History of 2  sections 2018     Priority: Low     Hx LTCS x2:   G1:  LTCS w/ G1 at 38w4d w/ GHS on 2018 due to BPD estimated 10.5cm, multiple fetal anomalies, no diagnosis ever made (8#2.9) --subsequent  demise at approx 1 mo age    G4:   Repeat LTCS at 39w0d w/ Dr. Zeferino Graham on 18 (7#1.1)    *Op note from Dr. Zeferino Graham reviewed: \"adhesions, significant bleeding during case, poor exposure due to obesity/adhesions\";  \"very difficult visualization due to obesity/adhesions. Consider vertical midline skin incision if has another c/s\"    Pt states now considering TOLAC--counseled extensively and will continue to do so    9/15/22:  discussed, again, w/ pt her hx and last op note. Would not at all attempt  due to significant increased risks to both her and baby and would recommend referral to [facility] for delivery if she were insistent on TOLAC. After extensive counseling pt agreeable to repeat c/s HOWEVER, states does NOT want a vertical midline incision. Pt counseled that in a life/death situation I cannot promise that a vertical incision may not be necessary.   Pt voices understanding         History of fetal anomaly in prior pregnancy, currently pregnant, second trimester 2018     Priority: Low       G1:  LTCS w/ G1 at 38w4d w/ GHS on 2018 due to BPD estimated 10.5cm, multiple fetal anomalies, no diagnosis ever made (8#2.9) --subsequent  demise at approx 1 mo age    (increased risk for NTD w/ future babies-->needs extra folic acid until end of 1st trimester (rx Folate 4mg every day)        History of neural tube defect in infant in prior pregnancy, currently pregnant 2018     Priority: Low      See Hx of Fetal Anomaly Overview         Mood evaluated today; PHQ2 reviewed. Risk of peripartum worsening. Recommend lifestyle/behavioral modifications to enhance mood (exercise, sunshine, mood apps, nutritional modifications, etc). Monitor BP  PTL precautions  Addressed normal pregnancy complaints, reassured and offered suggestions for care  Reviewed gestational age precautions and activity goals/limitations  Nutritional counseling as well as specific goals based on current maternal and fetal status  Options for GERD therapy if becomes symptomatic over course of pregnancy  Gestational age appropriate preventive care regarding communicable disease transmission and vaccines as appropriate (including flu, TDaP, and COVID.)  Additional plans and concerns as documented in problem list.   All questions answered and concerns discussed. An electronic signature was used to authenticate this note. Beverley Solorio MD    I have spent 34 minutes reviewing previous notes, test results and face to face with the patient discussing the diagnosis and importance of compliance with the treatment plan, in addition to ultrasound findings, as well as documenting on the day of the visit (10/25/2022). Return in about 8 weeks (around 2022) for growth.       Patient Active Problem List   Diagnosis Code    Supervision of high risk pregnancy in second trimester O09.92    Obesity affecting pregnancy in second trimester O99.212 History of 2  sections Z98.891    History of fetal anomaly in prior pregnancy, currently pregnant, second trimester O09.292    Pre-existing essential hypertension affecting pregnancy in second trimester O10.012    History of neural tube defect in infant in prior pregnancy, currently pregnant O09.299    Anxiety during pregnancy in second trimester, antepartum O99.342, F41.9    Heart palpitations R00.2    Tachycardia R00.0    Pelvic adhesive disease N73.6     Visit Diagnoses         Codes    21 weeks gestation of pregnancy    -  Primary Z3A.21

## 2022-11-02 ENCOUNTER — ROUTINE PRENATAL (OUTPATIENT)
Dept: OBGYN CLINIC | Age: 32
End: 2022-11-02

## 2022-11-02 VITALS — DIASTOLIC BLOOD PRESSURE: 76 MMHG | WEIGHT: 220 LBS | BODY MASS INDEX: 37.18 KG/M2 | SYSTOLIC BLOOD PRESSURE: 120 MMHG

## 2022-11-02 DIAGNOSIS — Z3A.22 22 WEEKS GESTATION OF PREGNANCY: ICD-10-CM

## 2022-11-02 DIAGNOSIS — O10.012 PRE-EXISTING ESSENTIAL HYPERTENSION AFFECTING PREGNANCY IN SECOND TRIMESTER: ICD-10-CM

## 2022-11-02 DIAGNOSIS — O09.92 SUPERVISION OF HIGH RISK PREGNANCY IN SECOND TRIMESTER: Primary | ICD-10-CM

## 2022-11-02 DIAGNOSIS — B37.31 VULVOVAGINITIS DUE TO YEAST: ICD-10-CM

## 2022-11-02 DIAGNOSIS — O99.212 OBESITY AFFECTING PREGNANCY IN SECOND TRIMESTER: ICD-10-CM

## 2022-11-02 PROCEDURE — 99902 PR PRENATAL VISIT: CPT | Performed by: OBSTETRICS & GYNECOLOGY

## 2022-11-02 RX ORDER — FLUCONAZOLE 150 MG/1
150 TABLET ORAL
Qty: 2 TABLET | Refills: 0 | Status: SHIPPED | OUTPATIENT
Start: 2022-11-02 | End: 2022-11-05

## 2022-11-02 NOTE — PROGRESS NOTES
Pete  presents for LEXII. [id number]. 22w6d. PL and MFM notes reviewed as applicable. Taking Prenatal Vitamins: yes  She is noticing:  Vulvar itching. No vag itching. Changed soaps- no help. As some impt info is always in the OB flowsheet, please also refer to that- including for billing/coding Qs. Pt asked me about her last c/s which I did, the adhesions present, my rec to make a vertical skin incision. I reviewed my op note. D/w pt that it is unusual for me to make that rec, so her scar tissue was significant. She asked about healing difference. It Is different, but better than ending up with a T skin incision. Explained to pt that it is not good to end up in a situation where we just can't logistically get to the uterus and get the baby out. Pete was seen today for routine prenatal visit. Diagnoses and all orders for this visit:    Supervision of high risk pregnancy in second trimester    Pre-existing essential hypertension affecting pregnancy in second trimester    Obesity affecting pregnancy in second trimester    22 weeks gestation of pregnancy    Vulvovaginitis due to yeast  -     terconazole (TERAZOL 7) 0.4 % vaginal cream; Place vaginally nightly. -     fluconazole (DIFLUCAN) 150 MG tablet;  Take 1 tablet by mouth every 48 hours for 2 doses

## 2022-11-02 NOTE — PROGRESS NOTES
Wet prep was done  Copious hyphae and budding yeast  Vulvar skin lichenified from scratching and rubbing. Rec cool packs to help decrease the itching.

## 2022-11-22 ENCOUNTER — OFFICE VISIT (OUTPATIENT)
Dept: CARDIOLOGY CLINIC | Age: 32
End: 2022-11-22
Payer: COMMERCIAL

## 2022-11-22 VITALS
BODY MASS INDEX: 38.24 KG/M2 | HEIGHT: 64 IN | HEART RATE: 100 BPM | SYSTOLIC BLOOD PRESSURE: 136 MMHG | DIASTOLIC BLOOD PRESSURE: 78 MMHG | WEIGHT: 224 LBS

## 2022-11-22 DIAGNOSIS — R00.2 HEART PALPITATIONS: Primary | ICD-10-CM

## 2022-11-22 PROCEDURE — 99213 OFFICE O/P EST LOW 20 MIN: CPT | Performed by: INTERNAL MEDICINE

## 2022-11-22 NOTE — PROGRESS NOTES
[facility] CARDIOLOGY  [address]  [address]  PHONE: [phone]          NAME:  Russ Siddiqui  : 1990  MRN: [id number]       SUBJECTIVE:   Russ Siddiqui is a 28 y.o. female seen for a follow up visit regarding the following:     Chief Complaint   Patient presents with    Results     Echo           HPI:    No cp or mosquera. No orthopnea or pnd. No palpitations or syncope. Past Medical History, Past Surgical History, Family history, Social History, and Medications were all reviewed with the patient today and updated as necessary. Current Outpatient Medications   Medication Sig Dispense Refill    aspirin 81 MG chewable tablet Take 162 mg by mouth daily      acetaminophen (TYLENOL) 500 MG tablet Take 500 mg by mouth every 6 hours as needed for Pain      polyethylene glycol (GLYCOLAX) 17 GM/SCOOP powder Take 17gm once or twice a day as needed 850 g 1    Prenatal Vit-Fe Fum-FA-Omega (PRENATAL MULTI +DHA PO) Take by mouth      folic acid (FOLATE) 312 MCG tablet Take 1 tablet by mouth in the morning. 30 tablet 8     No current facility-administered medications for this visit. Social History     Tobacco Use    Smoking status: Never    Smokeless tobacco: Never   Substance Use Topics    Alcohol use: No     Alcohol/week: 1.0 standard drink              PHYSICAL EXAM:   /78   Pulse 100   Ht 5' 4\" (1.626 m)   Wt 224 lb (101.6 kg)   LMP 2022   BMI 38.45 kg/m²    Constitutional: Oriented to person, place, and time. Appears well-developed and well-nourished. Head: Normocephalic and atraumatic. Neck: Neck supple. Cardiovascular: Normal rate and regular rhythm with no murmur -No JVP  Pulmonary/Chest: Breath sounds normal.   Abdominal: Soft. Musculoskeletal: No edema. Neurological: Alert and oriented to person, place, and time. Skin: Skin is warm and dry. Psychiatric: Normal mood and affect. Vitals reviewed.       Wt Readings from Last 3 Encounters:   11/22/22 224 lb (101.6 kg)   11/11/22 220 lb (99.8 kg)   11/02/22 220 lb (99.8 kg)       Medical problems and test results were reviewed with the patient today. ASSESSMENT and PLAN    1. Heart palpitations  Persists- pvcs on monitor- trial of mag if ok with OBGYN       Return in about 3 months (around 2/22/2023).          Luis E Sheridan MD  11/22/2022  3:17 PM

## 2022-11-29 ENCOUNTER — ROUTINE PRENATAL (OUTPATIENT)
Dept: OBGYN CLINIC | Age: 32
End: 2022-11-29
Payer: COMMERCIAL

## 2022-11-29 VITALS — WEIGHT: 222 LBS | BODY MASS INDEX: 38.11 KG/M2 | DIASTOLIC BLOOD PRESSURE: 80 MMHG | SYSTOLIC BLOOD PRESSURE: 128 MMHG

## 2022-11-29 DIAGNOSIS — R00.2 HEART PALPITATIONS: ICD-10-CM

## 2022-11-29 DIAGNOSIS — O10.012 PRE-EXISTING ESSENTIAL HYPERTENSION AFFECTING PREGNANCY IN SECOND TRIMESTER: ICD-10-CM

## 2022-11-29 DIAGNOSIS — N73.6 PELVIC ADHESIVE DISEASE: ICD-10-CM

## 2022-11-29 DIAGNOSIS — Z29.13 NEED FOR RHOGAM DUE TO RH NEGATIVE MOTHER: ICD-10-CM

## 2022-11-29 DIAGNOSIS — Z67.91 RH NEGATIVE STATE IN ANTEPARTUM PERIOD: ICD-10-CM

## 2022-11-29 DIAGNOSIS — Z13.1 SCREENING FOR DIABETES MELLITUS: ICD-10-CM

## 2022-11-29 DIAGNOSIS — O09.92 SUPERVISION OF HIGH RISK PREGNANCY IN SECOND TRIMESTER: Primary | ICD-10-CM

## 2022-11-29 DIAGNOSIS — Z98.891 HISTORY OF 2 CESAREAN SECTIONS: ICD-10-CM

## 2022-11-29 DIAGNOSIS — O99.212 OBESITY AFFECTING PREGNANCY IN SECOND TRIMESTER: ICD-10-CM

## 2022-11-29 DIAGNOSIS — O09.292 HISTORY OF FETAL ANOMALY IN PRIOR PREGNANCY, CURRENTLY PREGNANT, SECOND TRIMESTER: ICD-10-CM

## 2022-11-29 DIAGNOSIS — O99.342 ANXIETY DURING PREGNANCY IN SECOND TRIMESTER, ANTEPARTUM: ICD-10-CM

## 2022-11-29 DIAGNOSIS — F41.9 ANXIETY DURING PREGNANCY IN SECOND TRIMESTER, ANTEPARTUM: ICD-10-CM

## 2022-11-29 DIAGNOSIS — O26.899 RH NEGATIVE STATE IN ANTEPARTUM PERIOD: ICD-10-CM

## 2022-11-29 DIAGNOSIS — Z13.0 SCREENING FOR IRON DEFICIENCY ANEMIA: ICD-10-CM

## 2022-11-29 DIAGNOSIS — R00.0 TACHYCARDIA: ICD-10-CM

## 2022-11-29 LAB
BASOPHILS # BLD: 0 K/UL (ref 0–0.2)
BASOPHILS NFR BLD: 0 % (ref 0–2)
BLOOD GROUP ANTIBODIES SERPL: NORMAL
DIFFERENTIAL METHOD BLD: ABNORMAL
EOSINOPHIL # BLD: 0.1 K/UL (ref 0–0.8)
EOSINOPHIL NFR BLD: 1 % (ref 0.5–7.8)
ERYTHROCYTE [DISTWIDTH] IN BLOOD BY AUTOMATED COUNT: 13.9 % (ref 11.9–14.6)
HCT VFR BLD AUTO: 33.5 % (ref 35.8–46.3)
HGB BLD-MCNC: 10.9 G/DL (ref 11.7–15.4)
IMM GRANULOCYTES # BLD AUTO: 0 K/UL (ref 0–0.5)
IMM GRANULOCYTES NFR BLD AUTO: 0 % (ref 0–5)
LYMPHOCYTES # BLD: 1.5 K/UL (ref 0.5–4.6)
LYMPHOCYTES NFR BLD: 18 % (ref 13–44)
MCH RBC QN AUTO: 29.2 PG (ref 26.1–32.9)
MCHC RBC AUTO-ENTMCNC: 32.5 G/DL (ref 31.4–35)
MCV RBC AUTO: 89.8 FL (ref 82–102)
MONOCYTES # BLD: 0.4 K/UL (ref 0.1–1.3)
MONOCYTES NFR BLD: 5 % (ref 4–12)
NEUTS SEG # BLD: 6.3 K/UL (ref 1.7–8.2)
NEUTS SEG NFR BLD: 76 % (ref 43–78)
NRBC # BLD: 0 K/UL (ref 0–0.2)
PLATELET # BLD AUTO: 200 K/UL (ref 150–450)
PMV BLD AUTO: 11.2 FL (ref 9.4–12.3)
RBC # BLD AUTO: 3.73 M/UL (ref 4.05–5.2)
WBC # BLD AUTO: 8.3 K/UL (ref 4.3–11.1)

## 2022-11-29 PROCEDURE — 96372 THER/PROPH/DIAG INJ SC/IM: CPT | Performed by: OBSTETRICS & GYNECOLOGY

## 2022-11-29 PROCEDURE — 99902 PR PRENATAL VISIT: CPT | Performed by: OBSTETRICS & GYNECOLOGY

## 2022-11-29 RX ORDER — CALCIUM CARBONATE 300MG(750)
1 TABLET,CHEWABLE ORAL 2 TIMES DAILY
Qty: 60 TABLET | Refills: 3 | Status: SHIPPED | OUTPATIENT
Start: 2022-11-29

## 2022-11-29 NOTE — PROGRESS NOTES
KEYSHAWN  26w5d  Denies LOF, VB, Ctxs. Good FM. 2hr GTT today /CBC today   Rhogam and ab screen today as well          10/25/22 at [facility]: Normal anatomy and echo; AC 31%, EVIE 16.8cm, CL 4.5    Has FU w/ MFM on 22         OB hx:  G1:  LTCS w/ G1 at 38w4d w/ GHS on 2018 due to BPD estimated 10.5cm, multiple fetal anomalies, NTD; no diagnosis ever made (8#2.9) --subsequent  demise at approx 1 mo age     G4:   Repeat LTCS at 39w0d w/ Dr. Peewee Jimenez on 18 (7#1.1)                   [address] [facility] [address]  Diagnosed  in ER w/ \"stroke range bps\" per pt report  No longer taking Norvasc --pt self Dc'd around  and no meds since  No hx GISELA w/ G1/G2   Bp wnl today  Baseline HELLP labs within normal limits  Baseline PC ratio 0.1    Last EKG wnl 2018   TSH wnl   ASA 162mg recommended            Hx LTCS x2:   Op note from Dr. Peewee Jimenez reviewed: \"adhesions, significant bleeding during case, poor exposure due to obesity/adhesions\";  \"very difficult visualization due to obesity/adhesions. Consider vertical midline skin incision if has another c/s\"     Pt   was considering TOLAC--counseled extensively and will continue to do so    9/15/22:  discussed, again, w/ pt her hx and last op note. Would not at all attempt  due to significant increased risks to both her and baby and would recommend referral to [facility] for delivery if she were insistent on TOLAC. After extensive counseling pt agreeable to repeat c/s HOWEVER, states does NOT want a vertical midline incision. Pt counseled that in a life/death situation I cannot promise that a vertical incision may not be necessary. Pt voices understanding           BMI 37 (pregravid):  Hgb A1C 5.9   No hx GDM   States wants a more \"natural\" option then Glucola--counseled on option of checking fsbg  --> no hx of GDM, will plan routine timing of glycemic control at 24-28wk with glucose checking (2022 [facility])    Discussed watching carb intake very closely.     Prefers to do a 2hr 75mg glucola drink off of [facility] at 28wks (pt will buy it herself on their websitea and bring w/ her)             Rh neg:  RhoGAM today            Anxiety:   8/29/22 at [facility]: Pt reports increase anxiety being in our office due to hx of anomalies in previous pregnancy 4 years ago. Reports feeling \"down\" and laying around at home. Feeling \"extra irritable\". Discussed starting SSRI. Start Zoloft 50 mg po daily as desired  10/18:  stable w/ out meds currently   11/29:  stable w/out meds          Heart palpitations:  has apple watch and has been as high as in the 180s on occasion (reviewed watch and has had several episodes of 140's +)   Denies having this prior to pregnancy  Denies any assoc CP/SOB, calf pain/swelling   Some anxiety but denies anything excessive   Denies any excessive caffeine intake  Does use Cocoa butter daily  Discussed CV changes in pregnancy and progesterone effect on smooth mm   S/p referral to cards w/ Holter monitor    Echo (11/11/22): wnl; EF 55 - 60%.     See Cards note 11/22/22:  \"Persists- pvcs on monitor- trial of mag if ok with OBGYN\"     Rx Magnesium oxide given today (400mg BID )

## 2022-11-29 NOTE — PROGRESS NOTES
Rhogam given in [facility] s/p antibody screen, Lot: [id number] Exp: 2/25/24.  Pt tolerated injection well

## 2022-11-30 PROBLEM — O99.013 ANEMIA DURING PREGNANCY IN THIRD TRIMESTER: Status: ACTIVE | Noted: 2022-11-30

## 2022-11-30 LAB
GLUCOSE 1 HOUR: 124 MG/DL
GLUCOSE P FAST SERPL-MCNC: 85 MG/DL
GLUCOSE TOLERANCE TEST 2 HOUR: 128 MG/DL
GLUCOSE TOLERANCE: NORMAL

## 2022-11-30 NOTE — RESULT ENCOUNTER NOTE
Passed 2-hour GTT    Hemoglobin mildly low--would start FeSO4 325 mg w/ vitamin C 500 mg every other day.   Recheck hemoglobin in a few weeks

## 2022-12-12 RX ORDER — ASCORBIC ACID 500 MG
500 TABLET ORAL DAILY
Qty: 30 TABLET | Refills: 6 | Status: SHIPPED | OUTPATIENT
Start: 2022-12-12

## 2022-12-12 RX ORDER — FERROUS SULFATE 325(65) MG
325 TABLET ORAL
Qty: 30 TABLET | Refills: 6 | Status: SHIPPED | OUTPATIENT
Start: 2022-12-12

## 2022-12-15 ENCOUNTER — ROUTINE PRENATAL (OUTPATIENT)
Dept: OBGYN CLINIC | Age: 32
End: 2022-12-15

## 2022-12-15 VITALS — WEIGHT: 222 LBS | DIASTOLIC BLOOD PRESSURE: 80 MMHG | SYSTOLIC BLOOD PRESSURE: 122 MMHG | BODY MASS INDEX: 38.11 KG/M2

## 2022-12-15 DIAGNOSIS — N73.6 PELVIC ADHESIVE DISEASE: ICD-10-CM

## 2022-12-15 DIAGNOSIS — O09.93 SUPERVISION OF HIGH RISK PREGNANCY IN THIRD TRIMESTER: Primary | ICD-10-CM

## 2022-12-15 DIAGNOSIS — F41.9 ANXIETY DURING PREGNANCY IN SECOND TRIMESTER, ANTEPARTUM: ICD-10-CM

## 2022-12-15 DIAGNOSIS — O99.342 ANXIETY DURING PREGNANCY IN SECOND TRIMESTER, ANTEPARTUM: ICD-10-CM

## 2022-12-15 DIAGNOSIS — O09.299 HISTORY OF NEURAL TUBE DEFECT IN INFANT IN PRIOR PREGNANCY, CURRENTLY PREGNANT: ICD-10-CM

## 2022-12-15 DIAGNOSIS — O10.013 PRE-EXISTING ESSENTIAL HYPERTENSION AFFECTING PREGNANCY IN THIRD TRIMESTER: ICD-10-CM

## 2022-12-15 DIAGNOSIS — R00.0 TACHYCARDIA: ICD-10-CM

## 2022-12-15 DIAGNOSIS — O09.293 HISTORY OF FETAL ANOMALY IN PRIOR PREGNANCY, CURRENTLY PREGNANT IN THIRD TRIMESTER: ICD-10-CM

## 2022-12-15 DIAGNOSIS — Z98.891 HISTORY OF 2 CESAREAN SECTIONS: ICD-10-CM

## 2022-12-15 DIAGNOSIS — O09.292 HISTORY OF FETAL ANOMALY IN PRIOR PREGNANCY, CURRENTLY PREGNANT, SECOND TRIMESTER: ICD-10-CM

## 2022-12-15 DIAGNOSIS — O99.213 OBESITY AFFECTING PREGNANCY IN THIRD TRIMESTER: ICD-10-CM

## 2022-12-15 NOTE — PROGRESS NOTES
reports increase anxiety being in our office due to hx of anomalies in previous pregnancy 4 years ago. Reports feeling \"down\" and laying around at home. Feeling \"extra irritable\". Discussed starting SSRI. Start Zoloft 50 mg po daily as desired  10/18:  stable w/ out meds currently   11/29:  stable w/out meds  12/15/22:  stable         Heart palpitations:  has apple watch and has been as high as in the 180s on occasion (reviewed watch and has had several episodes of 140's +)   Denies having this prior to pregnancy  Denies any assoc CP/SOB, calf pain/swelling   Some anxiety but denies anything excessive   Denies any excessive caffeine intake  Does use Cocoa butter daily  Discussed CV changes in pregnancy and progesterone effect on smooth mm   S/p referral to cards w/ Holter monitor    Echo (11/11/22): wnl; EF 55 - 60%.     See Cards note 11/22/22:  \"Persists- pvcs on monitor- trial of mag if ok with OBGYN\"     Rx Magnesium oxide given 11/29/22 (400mg BID ) ---taking         Mild Anemia:  Hgb 10.9 (11/29)  Instructed to start  FeSO4 325 mg w/ vitamin C 500 mg every other day

## 2022-12-19 ENCOUNTER — TELEPHONE (OUTPATIENT)
Dept: OBGYN CLINIC | Age: 32
End: 2022-12-19

## 2022-12-19 NOTE — TELEPHONE ENCOUNTER
Pt called with c/o flu like s/sx. Pt reports headache, body aches, sore throat and low grade fever. Pt states she is using mucinex and tylenol without much relief. Advised pt to go to PCP or Urgent Care for testing. Pt reports good fetal movement. Pt encouraged to stay hydrated. Pt agree's and voiced understanding.

## 2022-12-28 ENCOUNTER — ROUTINE PRENATAL (OUTPATIENT)
Dept: OBGYN CLINIC | Age: 32
End: 2022-12-28
Payer: COMMERCIAL

## 2022-12-28 ENCOUNTER — HOSPITAL ENCOUNTER (OUTPATIENT)
Age: 32
Discharge: HOME | End: 2022-12-28
Attending: OBSTETRICS & GYNECOLOGY | Admitting: OBSTETRICS & GYNECOLOGY
Payer: COMMERCIAL

## 2022-12-28 VITALS
HEIGHT: 64 IN | WEIGHT: 219 LBS | BODY MASS INDEX: 37.39 KG/M2 | DIASTOLIC BLOOD PRESSURE: 66 MMHG | SYSTOLIC BLOOD PRESSURE: 130 MMHG

## 2022-12-28 DIAGNOSIS — O10.013 PRE-EXISTING ESSENTIAL HYPERTENSION AFFECTING PREGNANCY IN THIRD TRIMESTER: ICD-10-CM

## 2022-12-28 DIAGNOSIS — B96.89 BV (BACTERIAL VAGINOSIS): ICD-10-CM

## 2022-12-28 DIAGNOSIS — O99.213 OBESITY AFFECTING PREGNANCY IN THIRD TRIMESTER: ICD-10-CM

## 2022-12-28 DIAGNOSIS — N76.0 BV (BACTERIAL VAGINOSIS): ICD-10-CM

## 2022-12-28 DIAGNOSIS — O99.013 ANEMIA DURING PREGNANCY IN THIRD TRIMESTER: ICD-10-CM

## 2022-12-28 DIAGNOSIS — Z3A.30 30 WEEKS GESTATION OF PREGNANCY: ICD-10-CM

## 2022-12-28 DIAGNOSIS — O09.93 SUPERVISION OF HIGH RISK PREGNANCY IN THIRD TRIMESTER: Primary | ICD-10-CM

## 2022-12-28 LAB
BACTERIA, WET MOUNT, POC: NORMAL
CLUE CELLS, WET MOUNT, POC: NORMAL
ERYTHROCYTE [DISTWIDTH] IN BLOOD BY AUTOMATED COUNT: 13.5 % (ref 11.9–14.6)
HCT VFR BLD AUTO: 31.9 % (ref 35.8–46.3)
HGB BLD-MCNC: 10.5 G/DL (ref 11.7–15.4)
MCH RBC QN AUTO: 28.9 PG (ref 26.1–32.9)
MCHC RBC AUTO-ENTMCNC: 32.9 G/DL (ref 31.4–35)
MCV RBC AUTO: 87.9 FL (ref 82–102)
NRBC # BLD: 0 K/UL (ref 0–0.2)
PLATELET # BLD AUTO: 184 K/UL (ref 150–450)
PMV BLD AUTO: 10.1 FL (ref 9.4–12.3)
RBC # BLD AUTO: 3.63 M/UL (ref 4.05–5.2)
RBC WET MOUNT, POC: NORMAL
TRICH, WET MOUNT, POC: NORMAL
WBC # BLD AUTO: 8.2 K/UL (ref 4.3–11.1)
WBC, WET MOUNT, POC: NORMAL
YEAST, WET MOUNT, POC: NORMAL

## 2022-12-28 PROCEDURE — 87210 SMEAR WET MOUNT SALINE/INK: CPT | Performed by: OBSTETRICS & GYNECOLOGY

## 2022-12-28 PROCEDURE — 59025 FETAL NON-STRESS TEST: CPT

## 2022-12-28 PROCEDURE — 99282 EMERGENCY DEPT VISIT SF MDM: CPT

## 2022-12-28 PROCEDURE — 99902 PR PRENATAL VISIT: CPT | Performed by: OBSTETRICS & GYNECOLOGY

## 2022-12-28 RX ORDER — METRONIDAZOLE 500 MG/1
500 TABLET ORAL 2 TIMES DAILY
Qty: 14 TABLET | Refills: 0 | Status: SHIPPED | OUTPATIENT
Start: 2022-12-28 | End: 2023-01-04

## 2022-12-28 NOTE — PROGRESS NOTES
Jayda Justin  presents for LEXII. . 30w6d. PL and MFM notes reviewed as applicable. Taking Prenatal Vitamins: Yes  She is noticing:  Vaginal pain and pressure. Declines cx exam  As some impt info is always in the OB flowsheet, please also refer to that. Obesity affecting pregnancy in second trimester  Passed 2hr gtt at 28wks    Anemia during pregnancy in third trimester  hgb 10.9 at 28wks     Pt taking fe qod. Causes constipation. Cbc sent today    With DT, fht's 160-180  On monitor. No ctx's  Difficult to tell if having fetal tachycardia or prolonged accels  Lots of FM- pt feeling it, and I hear audible kicks. Pt denies use of cocoa butter, any meds or supplements that should cause fetal tach. Had pt on monitor for an hour. Could not get a long enough stretch of nl baseline to call this a nl strip with long accels. Sending pt to ob triage for monitoring. Pt c/o d/c. Increased. No itching etc.   Spec exam shows increased d/c, no redness  Wet prep shows clue cells  Rx flagyl    Jayda Justin was seen today for routine prenatal visit. Diagnoses and all orders for this visit:    Supervision of high risk pregnancy in third trimester    Obesity affecting pregnancy in third trimester    Pre-existing essential hypertension affecting pregnancy in third trimester    Anemia during pregnancy in third trimester  -     CBC; Future    30 weeks gestation of pregnancy    Lactating mother  -     DME Order for Breast Pump as OP    BV (bacterial vaginosis)    Other orders  -     metroNIDAZOLE (FLAGYL) 500 MG tablet;  Take 1 tablet by mouth 2 times daily for 7 days

## 2022-12-28 NOTE — PROGRESS NOTES
[facility]              Date: 01/29/3919    Patient: Mili Lora   YOB: 1990   Date of Visit: 12/28/2022       To Whom It May Concern:    Patient was seen at [facility] on 12/28/2022 due to medical issues she will be unable to return to work until 12/29/2022. If you have any questions or concerns, please don't hesitate to call.     Sincerely,    _____________________________    Jurgen Charles RN    [phone]

## 2022-12-28 NOTE — H&P
History & Physical    Name: Manuel Hunter MRN: [id number]  SSN: [id number]    YOB: 1990  Age: 28 y.o. Sex: female        Subjective: NST  HPI: 27 yo  at 30+6 wks presented from the office for NST evaluation. She was seen for an OB visit and elevated doptones were noted. In office monitoring was then performed and was elevated. She was sent to the [facility] for further evaluation. She denies ctxs, vb, or lof. She reports normal FM. Pregnancy has been c/b anxiety, h/o fetal demise due to multiple anomalies, and pre-existing HTN. Estimated Date of Delivery: 3/2/23  OB History    Para Term  AB Living   3 2 2 0 0 1   SAB IAB Ectopic Molar Multiple Live Births             2      # Outcome Date GA Lbr Raul/2nd Weight Sex Delivery Anes PTL Lv   3 Current            2 Term 18 39w0d  7 lb 1.1 oz (3.205 kg) M CS-LTranv  N ARMEN   1 Term 18 38w3d  8 lb 2.9 oz (3.71 kg) M CS-LTranv  N DEC      Birth Comments: lived 24 days. multiple fetal anomalies \"brain wasn't fully developed-hydrocephaly\"       Past Medical History:   Diagnosis Date    Abnormal Papanicolaou smear of cervix     Adenomyosis 2022    Added automatically from request for surgery [id number]    Anemia     oral Fe    Asthma     as a child    COVID-19 vaccine series not completed 2022    Has had COVID 19 infxn -- 2021 Has NOT been COVID 19 vaccinated --counseled and medical websites given for reference    Depression     Endometriosis 2017: dx lsc w/ Óscar POSTOPERATIVE DIAGNOSES  1. Stage IV endometriosis with involvement of the bladder flap  and adhesions to the anterior lower fundus. 2. Adhesions of the colon to the posterior lower uterine segment. 3. Bilateral adhesions of the ovaries to the pelvic side walls. 4. Endometrioma of the left ovary.     Frequent sinus infections     Heart palpitations     Hypertension     hx of; controlled with diet; no meds    Intramural leiomyoma of uterus 2017    LGSIL Pap smear of vagina     Migraine without aura, not intractable 2017    Morbid obesity ([facility] [address]     BMI-35     Past Surgical History:   Procedure Laterality Date     SECTION  02/2018 x2    elecitve c/s --multiple fetal anomalies, bpd estimated 10.5cm. COLPOSCOPY      GYN  2017    laparoscopy with CO2 laser of endometriosis, lysis of adhesions, ovarian cystectomy and chromotubastion     WISDOM TOOTH EXTRACTION       Social History     Occupational History    Not on file   Tobacco Use    Smoking status: Never    Smokeless tobacco: Never   Vaping Use    Vaping Use: Never used   Substance and Sexual Activity    Alcohol use: No     Alcohol/week: 1.0 standard drink    Drug use: No    Sexual activity: Yes     Partners: Male     Family History   Problem Relation Age of Onset    Colon Cancer Neg Hx     Depression Other     Diabetes Maternal Grandmother     Hypertension Maternal Grandmother     Diabetes Paternal Grandmother     Breast Cancer Neg Hx     No Known Problems Father     No Known Problems Mother     Hypertension Paternal Grandmother     Ovarian Cancer Neg Hx        Allergies   Allergen Reactions    Latex Rash    Hydromorphone Itching     Dilaudid     Prior to Admission medications    Medication Sig Start Date End Date Taking?  Authorizing Provider   metroNIDAZOLE (FLAGYL) 500 MG tablet Take 1 tablet by mouth 2 times daily for 7 days  Patient not taking: Reported on 2022  Tay Ron MD   vitamin C (ASCORBIC ACID) 500 MG tablet Take 1 tablet by mouth daily 22   Remedios Alba MD   ferrous sulfate (IRON 325) 325 (65 Fe) MG tablet Take 1 tablet by mouth daily (with breakfast) 22   Remedios Alba MD   Magnesium 400 MG TABS Take 1 tablet by mouth 2 times daily 22   Remedios Alba MD   aspirin 81 MG chewable tablet Take 162 mg by mouth daily    Historical Provider, MD   acetaminophen (TYLENOL) 500 MG tablet Take 500 mg by mouth every 6 hours as needed for Pain    Historical Provider, MD   polyethylene glycol (GLYCOLAX) 17 GM/SCOOP powder Take 17gm once or twice a day as needed 22   Arlene Paz MD   Prenatal Vit-Fe Fum-FA-Omega (PRENATAL MULTI +DHA PO) Take by mouth    Historical Provider, MD   folic acid (FOLATE) 669 MCG tablet Take 1 tablet by mouth in the morning. 8/10/22   Kimberlyn Moore MD        Review of Systems: Pertinent items are noted in HPI. 10 point ROS is otherwise positive for general discomforts in pregnancy    Objective:     Vitals: Per RN, vitals were recorded on the paper tracing which was discarded. BP recorded in the office today was wnl. There were no vitals filed for this visit. Physical Exam:   Patient without distress  Abdomen: soft, nontender  Fundus: soft and non tender  Lower Extremities:  - Edema 1+  Membranes:  Intact  Fetal Heart Rate: Baseline: 150-160 per minute  Variability: moderate  Accelerations: yes  Decelerations: none  Uterine contractions: none    Prenatal Labs:   Lab Results   Component Value Date/Time    ABORH O NEGATIVE 08/10/2022 10:50 AM    HIVEXT Negative 2018 12:00 AM         Assessment/Plan: 27 yo  at 30+6wks presented for fetal monitoring. I reviewed and interpreted the NST as a category 1 tracing. Active Problems:    * No active hospital problems. *  Resolved Problems:    * No resolved hospital problems. *       Plan:   Keep OB f/u as scheduled. Routine OB precautions.

## 2022-12-28 NOTE — DISCHARGE INSTRUCTIONS
Pregnancy Precautions: Care Instructions  Your Care Instructions     There is no sure way to prevent labor before your due date ( labor) or to prevent most other pregnancy problems. But there are things you can do to increase your chances of a healthy pregnancy. Go to your appointments, follow your doctor's advice, and take good care of yourself. Eat well, and exercise (if your doctor agrees). And make sure to drink plenty of water. Follow-up care is a key part of your treatment and safety. Be sure to make and go to all appointments, and call your doctor if you are having problems. It's also a good idea to know your test results and keep a list of the medicines you take. How can you care for yourself at home? Make sure you go to your prenatal appointments. At each visit, your doctor will check your blood pressure. Your doctor will also check to see if you have protein in your urine. High blood pressure and protein in urine are signs of preeclampsia. This condition can be dangerous for you and your baby. Drink plenty of fluids. Dehydration can cause contractions. If you have kidney, heart, or liver disease and have to limit fluids, talk with your doctor before you increase the amount of fluids you drink. Tell your doctor right away if you notice any symptoms of an infection, such as:  Burning when you urinate. A foul-smelling discharge from your vagina. Vaginal itching. Unexplained fever. Unusual pain or soreness in your uterus or lower belly. Eat a balanced diet. Include plenty of foods that are high in calcium and iron. Foods high in calcium include milk, cheese, yogurt, almonds, and broccoli. Foods high in iron include red meat, shellfish, poultry, eggs, beans, raisins, whole-grain bread, and leafy green vegetables. Do not smoke. If you need help quitting, talk to your doctor about stop-smoking programs and medicines. These can increase your chances of quitting for good.   Do not drink alcohol or use marijuana or illegal drugs. Follow your doctor's directions about activity. Your doctor will let you know how much, if any, exercise you can do. Ask your doctor if you can have sex. If you are at risk for early labor, your doctor may ask you to not have sex. Take care to prevent falls. During pregnancy, your joints are loose, and your balance is off. Sports such as bicycling, skiing, or in-line skating can increase your risk of falling. And don't ride horses or motorcycles, dive, water ski, scuba dive, or parachute jump while you are pregnant. Avoid things that can make your body too hot and may be harmful to your baby, such as a hot tub or sauna. Or talk with your doctor before doing anything that raises your body temperature. Your doctor can tell you if it's safe. Do not take any over-the-counter or herbal medicines or supplements without talking to your doctor or pharmacist first.  When should you call for help? Call 911  anytime you think you may need emergency care. For example, call if:    You passed out (lost consciousness). You have a seizure. You have severe vaginal bleeding. You have severe pain in your belly or pelvis. You have had fluid gushing or leaking from your vagina and you know or think the umbilical cord is bulging into your vagina. If this happens, immediately get down on your knees so your rear end (buttocks) is higher than your head. This will decrease the pressure on the cord until help arrives. Call your doctor now or seek immediate medical care if:    You have signs of preeclampsia, such as:  Sudden swelling of your face, hands, or feet. New vision problems (such as dimness, blurring, or seeing spots). A severe headache. You have any vaginal bleeding. You have belly pain or cramping. You have a fever. You have had regular contractions (with or without pain) for an hour.  This means that you have 8 or more within 1 hour or 4 or more in 20 minutes after you change your position and drink fluids. You have a sudden release of fluid from your vagina. You have low back pain or pelvic pressure that does not go away. You notice that your baby has stopped moving or is moving much less than normal.   Watch closely for changes in your health, and be sure to contact your doctor if you have any problems. Where can you learn more? Go to http://www.woods.com/ and enter Y951 to learn more about \"Pregnancy Precautions: Care Instructions. \"  Current as of: February 23, 2022               Content Version: 13.5  © 8961-0604 [employer]. Care instructions adapted under license by [facility] ([facility]). If you have questions about a medical condition or this instruction, always ask your healthcare professional. [address] any warranty or liability for your use of this information. Counting Your Baby's Kicks: Care Instructions  Overview     Counting your baby's kicks is one way your doctor can tell that your baby is healthy. Most women--especially in a first pregnancy--feel their baby move for the first time between 16 and 22 weeks. The movement may feel like flutters rather than kicks. Your baby may move more at certain times of the day. When you are active, you may notice less kicking than when you are resting. At your prenatal visits, your doctor will ask whether the baby is active. In your last trimester, your doctor may ask you to count the number of times you feel your baby move. Follow-up care is a key part of your treatment and safety. Be sure to make and go to all appointments, and call your doctor if you are having problems. It's also a good idea to know your test results and keep a list of the medicines you take. How do you count fetal kicks?   A common method of checking your baby's movement is to note the length of time it takes to count ten movements (such as kicks, flutters, or rolls). Pick your baby's most active time of day to count. This may be any time from morning to evening. If you don't feel 10 movements in an hour, have something to eat or drink and count for another hour. If you don't feel at least 10 movements in the 2-hour period, call your doctor. When should you call for help? Call your doctor now or seek immediate medical care if:    You noticed that your baby has stopped moving or is moving much less than normal.   Watch closely for changes in your health, and be sure to contact your doctor if you have any problems. Where can you learn more? Go to http://www.woods.com/ and enter U048 to learn more about \"Counting Your Baby's Kicks: Care Instructions. \"  Current as of: February 23, 2022               Content Version: 13.5  © 5115-6172 [employer]. Care instructions adapted under license by [facility] [place] ([facility]). If you have questions about a medical condition or this instruction, always ask your healthcare professional. [address] any warranty or liability for your use of this information.

## 2022-12-28 NOTE — PROGRESS NOTES
Discharge instructions discussed and signed, copy given to pt. Pt stable at d/c. Take the steroids for the next 4 days  You can treat your other cold symptoms with Tylenol and ibuprofen  If you have worsening shortness of breath, please return to your nearest ER

## 2022-12-28 NOTE — PROGRESS NOTES
Pt sent from [facility] office for monitoring due to FHR tachy on NST in office today. Orders to have [facility] to assess pt. TOCO and EFM on and tracing well.

## 2022-12-29 PROBLEM — O09.293: Status: ACTIVE | Noted: 2018-05-28

## 2022-12-30 ENCOUNTER — ROUTINE PRENATAL (OUTPATIENT)
Dept: OBGYN CLINIC | Age: 32
End: 2022-12-30
Payer: COMMERCIAL

## 2022-12-30 VITALS — DIASTOLIC BLOOD PRESSURE: 70 MMHG | SYSTOLIC BLOOD PRESSURE: 110 MMHG

## 2022-12-30 DIAGNOSIS — O09.93 SUPERVISION OF HIGH RISK PREGNANCY IN THIRD TRIMESTER: ICD-10-CM

## 2022-12-30 DIAGNOSIS — O99.340 ANXIETY DURING PREGNANCY: ICD-10-CM

## 2022-12-30 DIAGNOSIS — I49.9: ICD-10-CM

## 2022-12-30 DIAGNOSIS — O09.293 HISTORY OF FETAL ANOMALY IN PRIOR PREGNANCY, CURRENTLY PREGNANT, THIRD TRIMESTER: Primary | ICD-10-CM

## 2022-12-30 DIAGNOSIS — O09.299 HISTORY OF NEURAL TUBE DEFECT IN INFANT IN PRIOR PREGNANCY, CURRENTLY PREGNANT: ICD-10-CM

## 2022-12-30 DIAGNOSIS — O10.013 PRE-EXISTING ESSENTIAL HYPERTENSION AFFECTING PREGNANCY IN THIRD TRIMESTER: ICD-10-CM

## 2022-12-30 DIAGNOSIS — F41.9 ANXIETY DURING PREGNANCY: ICD-10-CM

## 2022-12-30 DIAGNOSIS — O99.213 OBESITY AFFECTING PREGNANCY IN THIRD TRIMESTER: ICD-10-CM

## 2022-12-30 DIAGNOSIS — Z13.32 ENCOUNTER FOR SCREENING FOR MATERNAL DEPRESSION: ICD-10-CM

## 2022-12-30 DIAGNOSIS — O09.293 HISTORY OF FETAL ANOMALY IN PRIOR PREGNANCY, CURRENTLY PREGNANT IN THIRD TRIMESTER: ICD-10-CM

## 2022-12-30 DIAGNOSIS — Z98.891 HISTORY OF 2 CESAREAN SECTIONS: ICD-10-CM

## 2022-12-30 DIAGNOSIS — O99.013 ANEMIA DURING PREGNANCY IN THIRD TRIMESTER: ICD-10-CM

## 2022-12-30 DIAGNOSIS — Z3A.31 31 WEEKS GESTATION OF PREGNANCY: ICD-10-CM

## 2022-12-30 DIAGNOSIS — O99.413: ICD-10-CM

## 2022-12-30 PROBLEM — R00.0 TACHYCARDIA: Status: RESOLVED | Noted: 2022-10-17 | Resolved: 2022-12-30

## 2022-12-30 PROCEDURE — 76816 OB US FOLLOW-UP PER FETUS: CPT | Performed by: OBSTETRICS & GYNECOLOGY

## 2022-12-30 PROCEDURE — 96127 BRIEF EMOTIONAL/BEHAV ASSMT: CPT | Performed by: OBSTETRICS & GYNECOLOGY

## 2022-12-30 PROCEDURE — 76819 FETAL BIOPHYS PROFIL W/O NST: CPT | Performed by: OBSTETRICS & GYNECOLOGY

## 2022-12-30 PROCEDURE — 99214 OFFICE O/P EST MOD 30 MIN: CPT | Performed by: OBSTETRICS & GYNECOLOGY

## 2022-12-30 ASSESSMENT — PATIENT HEALTH QUESTIONNAIRE - PHQ9
2. FEELING DOWN, DEPRESSED OR HOPELESS: 0
SUM OF ALL RESPONSES TO PHQ QUESTIONS 1-9: 1
SUM OF ALL RESPONSES TO PHQ9 QUESTIONS 1 & 2: 1
1. LITTLE INTEREST OR PLEASURE IN DOING THINGS: 1
SUM OF ALL RESPONSES TO PHQ QUESTIONS 1-9: 1

## 2023-01-03 RX ORDER — FLUCONAZOLE 150 MG/1
150 TABLET ORAL
Qty: 2 TABLET | Refills: 0 | Status: SHIPPED | OUTPATIENT
Start: 2023-01-03

## 2023-01-04 RX ORDER — FERROUS SULFATE 325(65) MG
TABLET ORAL
Qty: 30 TABLET | Refills: 6 | OUTPATIENT
Start: 2023-01-04

## 2023-01-04 RX ORDER — LORATADINE 10 MG
TABLET ORAL
Qty: 30 TABLET | Refills: 6 | OUTPATIENT
Start: 2023-01-04

## 2023-01-12 ENCOUNTER — ROUTINE PRENATAL (OUTPATIENT)
Dept: OBGYN CLINIC | Age: 33
End: 2023-01-12

## 2023-01-12 VITALS — WEIGHT: 218 LBS | BODY MASS INDEX: 37.42 KG/M2 | DIASTOLIC BLOOD PRESSURE: 70 MMHG | SYSTOLIC BLOOD PRESSURE: 122 MMHG

## 2023-01-12 DIAGNOSIS — O09.93 SUPERVISION OF HIGH RISK PREGNANCY IN THIRD TRIMESTER: ICD-10-CM

## 2023-01-12 DIAGNOSIS — O99.213 OBESITY AFFECTING PREGNANCY IN THIRD TRIMESTER: ICD-10-CM

## 2023-01-12 DIAGNOSIS — F41.9 ANXIETY DURING PREGNANCY: ICD-10-CM

## 2023-01-12 DIAGNOSIS — O10.013 PRE-EXISTING ESSENTIAL HYPERTENSION AFFECTING PREGNANCY IN THIRD TRIMESTER: ICD-10-CM

## 2023-01-12 DIAGNOSIS — O99.340 ANXIETY DURING PREGNANCY: ICD-10-CM

## 2023-01-12 DIAGNOSIS — O09.293 HISTORY OF FETAL ANOMALY IN PRIOR PREGNANCY, CURRENTLY PREGNANT, THIRD TRIMESTER: Primary | ICD-10-CM

## 2023-01-12 PROCEDURE — 99902 PR PRENATAL VISIT: CPT | Performed by: OBSTETRICS & GYNECOLOGY

## 2023-01-12 NOTE — PROGRESS NOTES
Antonio Phillips 33w0d    Denies LOF, VB, Ctxs. Good FM. Vitals:    23 0907   BP: 122/70     Weight Metrics 2023 2022 12/15/2022 2022 2022 2022 2022   Weight 218 lb 219 lb 222 lb 222 lb 224 lb 220 lb 220 lb   BMI (Calculated) 0 kg/m2 37.7 kg/m2 0 kg/m2 0 kg/m2 38.5 kg/m2 37.8 kg/m2 0 kg/m2     TWG: -1 lb    FH 33  FHTs+++      1. History of fetal anomaly in prior pregnancy, currently pregnant, third trimester    2. Supervision of high risk pregnancy in third trimester    3. Pre-existing essential hypertension affecting pregnancy in third trimester    4. Obesity affecting pregnancy in third trimester    5. Anxiety during pregnancy      23: Reviewed pt's past dx of CHTN - pt is not on meds. Discussed r/b/a to 2x wk testing - pt declines 2x wk testing. Strict BP precautions and kick counts reviewed. Pt requested to talk about dates for her RLTCD - desires 23. Will need to be scheduled. Pt had questions regarding whether or not I would perform a vertical skin incision if I performed her delivery. At first, I presumed her prior counseling by my partners was about a vertical incision on her uterus, however, after reading notes below, pt has been counseled that vertical skin incision has been recommended based on how severe her adhesions were in her last . Pt is adamant that she does not want to have a vertical skin incision. Discussed that she may be at increased risk of bowel injury that could result in potential to have to have part of her bowel resected and reanastomosed if possible, but ultimately could end up with a temporary colostomy, etc if bowel injury occurred due to her adhesions and potential for very poor visibility from severe adhesions.  Pt expressed understanding that injury to bowel or bladder or worse could occur and having limited visibility from horizontal incision may hinder - but ultimately, if she refuses vertical incision, I would try my best to avoid. Additionally, I discussed that there may be difficulty getting baby out with either type of skin incision if adhesions are so severe. Pt expressed understanding. Will have her return in 2wks, earlier if any issues. Mavis bennett reviewed. - CB    2hr GTT wnl   S/p Rhogam on 11/29/22         10/25/22 at [facility]: Normal anatomy and echo; AC 31%, EVIE 16.8cm, CL 4.5  2022 at [facility]: Appropriate growth; AC 21%, Overall 43%, EVIE 13.3 cm, UA Dopplers WNL, BPP . OB hx:  G1:  LTCS w/ G1 at 38w4d w/ [facility] on 2018 due to BPD estimated 10.5cm, multiple fetal anomalies, NTD; no diagnosis ever made (8#2.9) --subsequent  demise at approx 1 mo age     G4:   Repeat LTCS at 39w0d w/ Dr. Lindsay Garcia on 18 (7#1.1)                   [address] [facility] [address]  Diagnosed  in ER w/ \"stroke range bps\" per pt report  No longer taking Norvasc --pt self Dc'd around  and no meds since  No hx GISELA w/ G1/G2   Bp wnl today  Baseline HELLP labs within normal limits  Baseline PC ratio 0.1    Last EKG wnl 2018   TSH wnl   ASA 162mg recommended           Hx LTCS x2:   Op note from Dr. Lindsay Garcia reviewed: \"adhesions, significant bleeding during case, poor exposure due to obesity/adhesions\";  \"very difficult visualization due to obesity/adhesions. Consider vertical midline skin incision if has another c/s\" Pt was considering TOLAC--counseled extensively and will continue to do so. 9/15/22:  discussed, again, w/ pt her hx and last op note. Would not at all attempt  due to significant increased risks to both her and baby and would recommend referral to [facility] for delivery if she were insistent on TOLAC. After extensive counseling pt agreeable to repeat c/s HOWEVER, states does NOT want a vertical midline incision. Pt counseled that in a life/death situation I cannot promise that a vertical incision may not be necessary. Pt voices understanding.            BMI 37 (pregravid):  Hgb A1C 5.9   No hx GDM   States wants a more \"natural\" option then Glucola--counseled on option of checking fsbg   2hr 75mg glucola drink off of [facility] website wnl          Anxiety:   8/29/22 at [facility]: Pt reports increase anxiety being in our office due to hx of anomalies in previous pregnancy 4 years ago. Reports feeling \"down\" and laying around at home. Feeling \"extra irritable\". Discussed starting SSRI. Start Zoloft 50 mg po daily as desired  10/18:  stable w/ out meds currently   11/29:  stable w/out meds  12/15/22:  stable         Heart palpitations:  has apple watch and has been as high as in the 180s on occasion (reviewed watch and has had several episodes of 140's +)   Denies having this prior to pregnancy  Denies any assoc CP/SOB, calf pain/swelling   Some anxiety but denies anything excessive   Denies any excessive caffeine intake  Does use Cocoa butter daily  Discussed CV changes in pregnancy and progesterone effect on smooth mm   S/p referral to cards w/ Holter monitor    Echo (11/11/22): wnl; EF 55 - 60%. See Cards note 11/22/22:  \"Persists- pvcs on monitor- trial of mag if ok with OBGYN\"   Follow-up with Cardiology on 2/24/2023.    Rx Magnesium oxide given 11/29/22 (400mg BID ) ---taking         Mild Anemia:  Hgb 10.9 (11/29)  Instructed to start  FeSO4 325 mg w/ vitamin C 500 mg every other day

## 2023-01-16 ENCOUNTER — HOSPITAL ENCOUNTER (OUTPATIENT)
Age: 33
Discharge: HOME | End: 2023-01-16
Attending: OBSTETRICS & GYNECOLOGY | Admitting: OBSTETRICS & GYNECOLOGY
Payer: COMMERCIAL

## 2023-01-16 ENCOUNTER — TELEPHONE (OUTPATIENT)
Dept: OBGYN CLINIC | Age: 33
End: 2023-01-16

## 2023-01-16 VITALS
TEMPERATURE: 98.5 F | SYSTOLIC BLOOD PRESSURE: 110 MMHG | DIASTOLIC BLOOD PRESSURE: 57 MMHG | HEART RATE: 90 BPM | RESPIRATION RATE: 18 BRPM

## 2023-01-16 PROBLEM — O36.8130 DECREASED FETAL MOVEMENT AFFECTING MANAGEMENT OF PREGNANCY IN THIRD TRIMESTER, SINGLE OR UNSPECIFIED FETUS: Status: ACTIVE | Noted: 2023-01-16

## 2023-01-16 PROCEDURE — 6370000000 HC RX 637 (ALT 250 FOR IP): Performed by: OBSTETRICS & GYNECOLOGY

## 2023-01-16 PROCEDURE — 99282 EMERGENCY DEPT VISIT SF MDM: CPT

## 2023-01-16 RX ORDER — BUTALBITAL, ACETAMINOPHEN AND CAFFEINE 50; 325; 40 MG/1; MG/1; MG/1
1 TABLET ORAL EVERY 6 HOURS PRN
Qty: 10 TABLET | Refills: 0 | Status: SHIPPED | OUTPATIENT
Start: 2023-01-16

## 2023-01-16 RX ORDER — BUTALBITAL, ACETAMINOPHEN AND CAFFEINE 50; 325; 40 MG/1; MG/1; MG/1
1 TABLET ORAL ONCE
Status: COMPLETED | OUTPATIENT
Start: 2023-01-16 | End: 2023-01-16

## 2023-01-16 RX ADMIN — BUTALBITAL, ACETAMINOPHEN, AND CAFFEINE 1 TABLET: 50; 325; 40 TABLET ORAL at 18:28

## 2023-01-16 ASSESSMENT — PAIN DESCRIPTION - LOCATION: LOCATION: HEAD

## 2023-01-16 ASSESSMENT — PAIN DESCRIPTION - DESCRIPTORS: DESCRIPTORS: ACHING

## 2023-01-16 ASSESSMENT — PAIN SCALES - GENERAL: PAINLEVEL_OUTOF10: 7

## 2023-01-16 NOTE — PROGRESS NOTES
NST reactive. Pt c/o headache 7/10, BP WNL. Dr. Jonatan Jerez at [facility], orders received.

## 2023-01-16 NOTE — TELEPHONE ENCOUNTER
[facility] L[facility]. Repeat  scheduled for 23 @ 1000 with . Pt instructions sent via [employer] IOL form faxed to [facility].

## 2023-01-16 NOTE — DISCHARGE INSTRUCTIONS
Counting Your Baby's Kicks: Care Instructions  Overview     Counting your baby's kicks is one way your doctor can tell that your baby is healthy. Most women--especially in a first pregnancy--feel their baby move for the first time between 16 and 22 weeks. The movement may feel like flutters rather than kicks. Your baby may move more at certain times of the day. When you are active, you may notice less kicking than when you are resting. At your prenatal visits, your doctor will ask whether the baby is active. In your last trimester, your doctor may ask you to count the number of times you feel your baby move. Follow-up care is a key part of your treatment and safety. Be sure to make and go to all appointments, and call your doctor if you are having problems. It's also a good idea to know your test results and keep a list of the medicines you take. How do you count fetal kicks? A common method of checking your baby's movement is to note the length of time it takes to count ten movements (such as kicks, flutters, or rolls). Pick your baby's most active time of day to count. This may be any time from morning to evening. If you don't feel 10 movements in an hour, have something to eat or drink and count for another hour. If you don't feel at least 10 movements in the 2-hour period, call your doctor. When should you call for help? Call your doctor now or seek immediate medical care if:    You noticed that your baby has stopped moving or is moving much less than normal.   Watch closely for changes in your health, and be sure to contact your doctor if you have any problems. Where can you learn more? Go to http://www.woods.com/ and enter U048 to learn more about \"Counting Your Baby's Kicks: Care Instructions. \"  Current as of: February 23, 2022               Content Version: 13.5  © 3986-0977 [employer]wise, Incorporated. Care instructions adapted under license by [facility] ([facility]).  If you have questions about a medical condition or this instruction, always ask your healthcare professional. [place] [address] any warranty or liability for your use of this information.

## 2023-01-16 NOTE — TELEPHONE ENCOUNTER
Pt called with c/o decreased fetal movement. Pt states she ate a snack and sat down to monitor and did not get any movement. Pt reports she only felt one movement today and that was this morning at 0800 after breakfast. Instructed pt to go to [facility] L&D to be evaluated. Pt agree's and voiced understanding.

## 2023-01-17 NOTE — ED NOTES
OB ED Provider Note    Name: Carol Read MRN: [id number]     YOB: 1990  Age: 35 y.o. Sex: female      Subjective:     Reason for Presentation to [facility] ED:  decreased fetal movement    History of Present Illness: Ms. Lindle Meckel is a 35 y.o.  at 33w4d gestation with Estimated Date of Delivery: 3/2/23. Her current obstetrical history is significant for prior  x2 . Prenatal records reviewed. G1 had multiple fetal anomalies and lived only 24 days. Having decreased fetal movement all day today. Also mentions mild headache for which she has taken Tylenol but it is still there. No other preeclampsia symptoms. She denies vaginal bleeding. She denies leakage of fluid. She denies contractions. OB History    Para Term  AB Living   3 2 2 0 0 1   SAB IAB Ectopic Molar Multiple Live Births             2      # Outcome Date GA Lbr Raul/2nd Weight Sex Delivery Anes PTL Lv   3 Current            2 Term 18 39w0d  7 lb 1.1 oz (3.205 kg) M CS-LTranv  N ARMEN   1 Term 18 38w3d  8 lb 2.9 oz (3.71 kg) M CS-LTranv  N DEC      Birth Comments: lived 24 days. multiple fetal anomalies \"brain wasn't fully developed-hydrocephaly\"     Past Medical History:   Diagnosis Date    Abnormal Papanicolaou smear of cervix     Adenomyosis 2022    Added automatically from request for surgery [id number]    Anemia     oral Fe    Asthma     as a child    COVID-19 vaccine series not completed 2022    Has had COVID 19 infxn -- 2021 Has NOT been COVID 19 vaccinated --counseled and medical websites given for reference    Depression     Endometriosis 2017: dx lsc w/ Óscar POSTOPERATIVE DIAGNOSES  1. Stage IV endometriosis with involvement of the bladder flap  and adhesions to the anterior lower fundus. 2. Adhesions of the colon to the posterior lower uterine segment. 3. Bilateral adhesions of the ovaries to the pelvic side walls.    4. Endometrioma of the left ovary.    Frequent sinus infections     Heart palpitations     Hypertension     hx of; controlled with diet; no meds    Intramural leiomyoma of uterus 2017    LGSIL Pap smear of vagina     Migraine without aura, not intractable 2017    Morbid obesity ([address]     BMI-35    Tachycardia 10/17/2022     Past Surgical History:   Procedure Laterality Date     SECTION  02/2018 x2    elecitve c/s --multiple fetal anomalies, bpd estimated 10.5cm. COLPOSCOPY      GYN  2017    laparoscopy with CO2 laser of endometriosis, lysis of adhesions, ovarian cystectomy and chromotubastion     WISDOM TOOTH EXTRACTION       Social History     Occupational History    Not on file   Tobacco Use    Smoking status: Never    Smokeless tobacco: Never   Vaping Use    Vaping Use: Never used   Substance and Sexual Activity    Alcohol use: No     Alcohol/week: 1.0 standard drink    Drug use: No    Sexual activity: Yes     Partners: Male        Allergies   Allergen Reactions    Latex Rash    Hydromorphone Itching     Dilaudid     Prior to Admission medications    Medication Sig Start Date End Date Taking?  Authorizing Provider   butalbital-acetaminophen-caffeine (FIORICET, ESGIC) -45 MG per tablet Take 1 tablet by mouth every 6 hours as needed for Headaches 23  Yes Bassam Ladd MD   vitamin C (ASCORBIC ACID) 500 MG tablet Take 1 tablet by mouth daily 22   Izabela Hernández MD   ferrous sulfate (IRON 325) 325 (65 Fe) MG tablet Take 1 tablet by mouth daily (with breakfast) 22   Izabela Hernández MD   Magnesium 400 MG TABS Take 1 tablet by mouth 2 times daily 22   Izabela Hernández MD   aspirin 81 MG chewable tablet Take 162 mg by mouth daily    Historical Provider, MD   acetaminophen (TYLENOL) 500 MG tablet Take 500 mg by mouth every 6 hours as needed for Pain    Historical Provider, MD   polyethylene glycol (GLYCOLAX) 17 GM/SCOOP powder Take 17gm once or twice a day as needed 22   Leandro Mcardle Edson Abarca MD   Prenatal Vit-Fe Fum-FA-Omega (PRENATAL MULTI +DHA PO) Take by mouth    Historical Provider, MD   folic acid (FOLATE) 344 MCG tablet Take 1 tablet by mouth in the morning.  8/10/22   Juan David Obando MD          Objective:     Physical Exam:  VITALS:  BP (!) 110/57   Pulse 90   Temp 98.5 °F (36.9 °C) (Axillary)   Resp 18   LMP 05/25/2022   BP normal  CONSTITUTIONAL:  awake, alert, cooperative, no apparent distress, and appears stated age  FHTs: Reactive and reassuring and Category I strip  Uterine activity/Contractions: None seen on monitor      Assessment:  33w4d gestation  Decreased fetal movement; Reassuring fetal monitoring  Mild headache    Plan:  Discharge home, follow-up at next [facility] appointment  Gave short-term Rx Fioricet

## 2023-01-27 ENCOUNTER — ROUTINE PRENATAL (OUTPATIENT)
Dept: OBGYN CLINIC | Age: 33
End: 2023-01-27

## 2023-01-27 VITALS — BODY MASS INDEX: 38.11 KG/M2 | WEIGHT: 222 LBS | SYSTOLIC BLOOD PRESSURE: 122 MMHG | DIASTOLIC BLOOD PRESSURE: 70 MMHG

## 2023-01-27 DIAGNOSIS — F41.9 ANXIETY DURING PREGNANCY: ICD-10-CM

## 2023-01-27 DIAGNOSIS — O09.93 SUPERVISION OF HIGH RISK PREGNANCY IN THIRD TRIMESTER: ICD-10-CM

## 2023-01-27 DIAGNOSIS — O99.213 OBESITY AFFECTING PREGNANCY IN THIRD TRIMESTER: ICD-10-CM

## 2023-01-27 DIAGNOSIS — O10.013 PRE-EXISTING ESSENTIAL HYPERTENSION AFFECTING PREGNANCY IN THIRD TRIMESTER: ICD-10-CM

## 2023-01-27 DIAGNOSIS — O09.293 HISTORY OF FETAL ANOMALY IN PRIOR PREGNANCY, CURRENTLY PREGNANT, THIRD TRIMESTER: Primary | ICD-10-CM

## 2023-01-27 DIAGNOSIS — O99.340 ANXIETY DURING PREGNANCY: ICD-10-CM

## 2023-01-27 PROCEDURE — 99902 PR PRENATAL VISIT: CPT | Performed by: OBSTETRICS & GYNECOLOGY

## 2023-01-27 NOTE — PROGRESS NOTES
Brenda Crane 35w1d    Denies LOF, VB, Ctxs. Good FM. Has had intermittent HA and did have one episode at work where she was seeing dark spots in her vision and it resolved with sitting down and hydrating. 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 Has had worseing LE edema at the end of the day as well - wearing compression socks now. Vitals:    23 0829   BP: 122/70     Weight Metrics 2023 2023 2022 12/15/2022 2022 2022 2022   Weight 222 lb 218 lb 219 lb 222 lb 222 lb 224 lb 220 lb   BMI (Calculated) 0 kg/m2 0 kg/m2 37.7 kg/m2 0 kg/m2 0 kg/m2 38.5 kg/m2 37.8 kg/m2     TWG: 3 lb    FH 36  FHTs+++      1. History of fetal anomaly in prior pregnancy, currently pregnant, third trimester    2. Supervision of high risk pregnancy in third trimester    3. Pre-existing essential hypertension affecting pregnancy in third trimester    4. Obesity affecting pregnancy in third trimester    5. Anxiety during pregnancy      CHTN - pt is not on meds. Discussed r/b/a to 2x wk testing - pt declines 2x wk testing. Strict BP precautions and kick counts reviewed. Previous  w/ severe scar tissue:  Pt requested to talk about dates for her RLTCD last appt - desires 23. Will need to be scheduled. Pt had questions regarding whether or not I would perform a vertical skin incision if I performed her delivery. At first, I presumed her prior counseling by my partners was about a vertical incision on her uterus, however, after reading notes below, pt has been counseled that vertical skin incision has been recommended based on how severe her adhesions were in her last . Pt is adamant that she does not want to have a vertical skin incision.  Discussed that she may be at increased risk of bowel injury that could result in potential to have to have part of her bowel resected and reanastomosed if possible, but ultimately could end up with a temporary colostomy, etc if bowel injury occurred due to her adhesions and potential for very poor visibility from severe adhesions. Pt expressed understanding that injury to bowel or bladder or worse could occur and having limited visibility from horizontal incision may hinder - but ultimately, if she refuses vertical incision, I would try my best to avoid. Additionally, I discussed that there may be difficulty getting baby out with either type of skin incision if adhesions are so severe. Pt expressed understanding. Will revisit this after her MFM appt next week. Will have her return in 2wks, earlier if any issues. Kick counts reviewed. - CB    2hr GTT wnl   S/p Rhogam on 11/29/22         10/25/22 at [facility]: Normal anatomy and echo; AC 31%, EVIE 16.8cm, CL 4.5  2022 at [facility]: Appropriate growth; AC 21%, Overall 43%, EVIE 13.3 cm, UA Dopplers WNL, BPP . OB hx:  G1:  LTCS w/ G1 at 38w4d w/ GHS on 2018 due to BPD estimated 10.5cm, multiple fetal anomalies, NTD; no diagnosis ever made (8#2.9) --subsequent  demise at approx 1 mo age     G4:   Repeat LTCS at 39w0d w/ Dr. Luis Enrique Costa on 18 (7#1.1)                   [address] [facility] [address]  Diagnosed  in ER w/ \"stroke range bps\" per pt report  No longer taking Norvasc --pt self Dc'd around  and no meds since  No hx GISELA w/ G1/G2   Bp wnl today  Baseline HELLP labs within normal limits  Baseline PC ratio 0.1    Last EKG wnl 2018   TSH wnl   ASA 162mg recommended           Hx LTCS x2:   Op note from Dr. Luis Enrique Costa reviewed: \"adhesions, significant bleeding during case, poor exposure due to obesity/adhesions\";  \"very difficult visualization due to obesity/adhesions. Consider vertical midline skin incision if has another c/s\" Pt was considering TOLAC--counseled extensively and will continue to do so. 9/15/22:  discussed, again, w/ pt her hx and last op note.   Would not at all attempt  due to significant increased risks to both her and baby and would recommend referral to LIA for delivery if she were insistent on TOLAC. After extensive counseling pt agreeable to repeat c/s HOWEVER, states does NOT want a vertical midline incision. Pt counseled that in a life/death situation I cannot promise that a vertical incision may not be necessary. Pt voices understanding. BMI 37 (pregravid):  Hgb A1C 5.9   No hx GDM   States wants a more \"natural\" option then Glucola--counseled on option of checking fsbg   2hr 75mg glucola drink off of [facility] website wnl          Anxiety:   8/29/22 at [facility]: Pt reports increase anxiety being in our office due to hx of anomalies in previous pregnancy 4 years ago. Reports feeling \"down\" and laying around at home. Feeling \"extra irritable\". Discussed starting SSRI. Start Zoloft 50 mg po daily as desired  10/18:  stable w/ out meds currently   11/29:  stable w/out meds  12/15/22:  stable         Heart palpitations:  has apple watch and has been as high as in the 180s on occasion (reviewed watch and has had several episodes of 140's +)   Denies having this prior to pregnancy  Denies any assoc CP/SOB, calf pain/swelling   Some anxiety but denies anything excessive   Denies any excessive caffeine intake  Does use Cocoa butter daily  Discussed CV changes in pregnancy and progesterone effect on smooth mm   S/p referral to cards w/ Holter monitor    Echo (11/11/22): wnl; EF 55 - 60%. See Cards note 11/22/22:  \"Persists- pvcs on monitor- trial of mag if ok with OBGYN\"   Follow-up with Cardiology on 2/24/2023.    Rx Magnesium oxide given 11/29/22 (400mg BID ) ---taking         Mild Anemia:  Hgb 10.9 (11/29)  Instructed to start  FeSO4 325 mg w/ vitamin C 500 mg every other day

## 2023-02-02 PROBLEM — O36.8130 DECREASED FETAL MOVEMENT AFFECTING MANAGEMENT OF PREGNANCY IN THIRD TRIMESTER, SINGLE OR UNSPECIFIED FETUS: Status: RESOLVED | Noted: 2023-01-16 | Resolved: 2023-02-02

## 2023-02-03 ENCOUNTER — ROUTINE PRENATAL (OUTPATIENT)
Dept: OBGYN CLINIC | Age: 33
End: 2023-02-03
Payer: COMMERCIAL

## 2023-02-03 VITALS — DIASTOLIC BLOOD PRESSURE: 76 MMHG | SYSTOLIC BLOOD PRESSURE: 110 MMHG

## 2023-02-03 DIAGNOSIS — O99.013 ANEMIA DURING PREGNANCY IN THIRD TRIMESTER: ICD-10-CM

## 2023-02-03 DIAGNOSIS — O99.340 ANXIETY DURING PREGNANCY: ICD-10-CM

## 2023-02-03 DIAGNOSIS — I49.9: ICD-10-CM

## 2023-02-03 DIAGNOSIS — O09.93 SUPERVISION OF HIGH RISK PREGNANCY IN THIRD TRIMESTER: ICD-10-CM

## 2023-02-03 DIAGNOSIS — Z3A.36 36 WEEKS GESTATION OF PREGNANCY: ICD-10-CM

## 2023-02-03 DIAGNOSIS — O99.413: ICD-10-CM

## 2023-02-03 DIAGNOSIS — N73.6 PELVIC ADHESIVE DISEASE: ICD-10-CM

## 2023-02-03 DIAGNOSIS — K21.9 GASTROESOPHAGEAL REFLUX IN PREGNANCY: ICD-10-CM

## 2023-02-03 DIAGNOSIS — O99.619 GASTROESOPHAGEAL REFLUX IN PREGNANCY: ICD-10-CM

## 2023-02-03 DIAGNOSIS — O09.293 HISTORY OF FETAL ANOMALY IN PRIOR PREGNANCY, CURRENTLY PREGNANT, THIRD TRIMESTER: ICD-10-CM

## 2023-02-03 DIAGNOSIS — Z98.891 HISTORY OF 2 CESAREAN SECTIONS: ICD-10-CM

## 2023-02-03 DIAGNOSIS — O99.213 OBESITY AFFECTING PREGNANCY IN THIRD TRIMESTER: ICD-10-CM

## 2023-02-03 DIAGNOSIS — F41.9 ANXIETY DURING PREGNANCY: ICD-10-CM

## 2023-02-03 DIAGNOSIS — O09.299 HISTORY OF NEURAL TUBE DEFECT IN INFANT IN PRIOR PREGNANCY, CURRENTLY PREGNANT: ICD-10-CM

## 2023-02-03 DIAGNOSIS — O10.013 PRE-EXISTING ESSENTIAL HYPERTENSION AFFECTING PREGNANCY IN THIRD TRIMESTER: Primary | ICD-10-CM

## 2023-02-03 DIAGNOSIS — O09.293 HISTORY OF FETAL ANOMALY IN PRIOR PREGNANCY, CURRENTLY PREGNANT IN THIRD TRIMESTER: ICD-10-CM

## 2023-02-03 PROCEDURE — 76816 OB US FOLLOW-UP PER FETUS: CPT | Performed by: OBSTETRICS & GYNECOLOGY

## 2023-02-03 PROCEDURE — 76819 FETAL BIOPHYS PROFIL W/O NST: CPT | Performed by: OBSTETRICS & GYNECOLOGY

## 2023-02-03 PROCEDURE — 76820 UMBILICAL ARTERY ECHO: CPT | Performed by: OBSTETRICS & GYNECOLOGY

## 2023-02-03 PROCEDURE — 99214 OFFICE O/P EST MOD 30 MIN: CPT | Performed by: OBSTETRICS & GYNECOLOGY

## 2023-02-03 RX ORDER — FAMOTIDINE 20 MG/1
20 TABLET, FILM COATED ORAL 2 TIMES DAILY
Qty: 60 TABLET | Refills: 5 | Status: SHIPPED | OUTPATIENT
Start: 2023-02-03

## 2023-02-03 RX ORDER — CALCIUM CARBONATE 200(500)MG
1 TABLET,CHEWABLE ORAL DAILY
COMMUNITY

## 2023-02-03 ASSESSMENT — PATIENT HEALTH QUESTIONNAIRE - PHQ9
1. LITTLE INTEREST OR PLEASURE IN DOING THINGS: 1
SUM OF ALL RESPONSES TO PHQ QUESTIONS 1-9: 1
SUM OF ALL RESPONSES TO PHQ9 QUESTIONS 1 & 2: 1
SUM OF ALL RESPONSES TO PHQ QUESTIONS 1-9: 1
2. FEELING DOWN, DEPRESSED OR HOPELESS: 0

## 2023-02-03 NOTE — PROGRESS NOTES
MFM Follow-up Visit    Marcus Kayleigh (: 1990) is a 35 y.o. Ana Laura Quiroz at 36w1d with 3/2/2023, Alternate DENNISE Entry. Presents for evaluation of the following chief complaint(s):  High Risk Pregnancy (BMI>35, Hx  demise secondary to ONTD/multiple anomalies, Anxiety, Maternal PVCs) and Pregnancy Ultrasound (Growth BPP)     Patient is working full time (40 hours/week) at [employer] in [place]. Scheduled to see Primary OB [facility]) on 2023. Repeat C/S scheduled for  2023. Pt having difficulty with missing work for OB/MFM appts. Requests intermittent FMLA for visits as she will require visits throughout pregnancy due to high risk nature of her pregnancy- appropriate request.      HA's every other day; laying down improves. Has trace pitting edema in R>L; reports worsening after standing at work; reinforce compression stockings. Denies preeclamptic symptoms. Reports good fetal movement. No bleeding, LOF, cramping or contractions. Reports vaginal pressure with fetal position. Interval history since prior appt reviewed and updated as indicated. Review of Systems - per HPI; otherwise unremarkable. Exam:     Vitals:    23 1009   BP: 110/76     Recent Mood: fatigued - weight and swelling in BLE have been preventing her from being active; still attempts to get outside on nice days. Recent Labs reviewed and addressed. Ultrasound: Please see formal ultrasound report under imaging tab. ASSESSMENT/PLAN:  Patient Active Problem List    Diagnosis Date Noted    Supervision of high risk pregnancy in third trimester 08/15/2017     Priority: High     Overview Note:     OB hx:  G1:  LTCS w/ G1 at 38w4d w/ GHS on 2018 due to BPD estimated 10.5cm, multiple fetal anomalies, no diagnosis ever made (8#2.9) --subsequent  demise at approx 1 mo age  G4:   Repeat LTCS at 39w0d w/ Dr. Elberta Lesches on 18 (7#1.1)  22 at [facility]: Normal NT and nasal bone.   Genetic counseling done by MD. [facility]. 09/26/22 at [facility]: Normal early anatomy; AC 54%  10/25/22 at [facility]: Normal anatomy and echo; AC 31%, EVIE 16.8cm, CL 4.5  12/30/2022 at [facility]: Appropriate growth; AC 21%, Overall 43%, EVIE 13.3 cm, UA Dopplers WNL, BPP 8/8.  2/3/2023 at [facility]: Appropriate growth; AC 32%, Overall 40%, EVIE 14.1 cm, UA Dopplers WNL, BPP 8/8. Assessment & Plan Note:     No follow-up with [facility], refer back PRN  C/S scheduled for 2/24/2023      History of fetal anomaly in prior pregnancy, currently pregnant in third trimester 12/15/2022     Priority: Medium    Anemia during pregnancy in third trimester 11/30/2022     Priority: Medium     Overview Note:     Hemoglobin 10.9 (11/29/2022)  10.5 on 12-28      Pelvic adhesive disease 10/17/2022     Priority: Medium    Maternal arrhythmia affecting pregnancy, antepartum, third trimester 09/15/2022     Priority: Medium     Overview Note:     9/15/22  C/o heart palpitations-- has apple watch and has been as high as in the 180s on occasion (reviewed watch and has had several episodes of 140's +)   Denies having this prior to pregnancy  Denies any assoc CP/SOB, calf pain/swelling   Some anxiety but denies anything excessive   Denies any excessive caffeine intake  Dose use Cocoa butter daily  Discussed CV changes in pregnancy and progesterone effect on smooth mm   Will refer to cards for eval, possible Holter monitor/Echo   10/25/22 at [facility]: Saw cardiology last week, holter monitor for 3 days. EKG scheduled for 11/11/2022 and then follow up with Dr. Ginette Ye at [facility]. Improved since cutting out cocoa butter. Echo (11/11/22): wnl; EF 55 - 60%.     See Cards note 11/22/22:  \"Persists- pvcs on monitor- trial of mag if ok with OBGYN\"     11/29/22:  Rx Magnesium oxide given today (400mg BID )   12/30/22 [facility]:         Anxiety during pregnancy 08/29/2022     Priority: Medium     Overview Note:     08/29/22 at [facility]: Pt reports increase anxiety being in our office due to hx of anomalies in previous pregnancy 4 years ago. Reports feeling \"down\" and laying around at home. Feeling \"extra irritable\". Discussed starting SSRI.    22 at [place]: Reports stable mood  10/25/22 at [place]: Reports stable mood, discussed Zoloft, declined for now. 22 [place]: Reports stable mood, some life stressors, declines starting Zoloft at this time. 23 [place]: No change; stable mood. Start Zoloft 50 mg po daily as desired       Assessment & Plan Note:     Mood evaluated today; PHQ2 reviewed. Counseling re possibility of peripartum worsening. Mood Reassuring today  Recommend lifestyle/behavioral modifications to enhance mood (exercise, sunshine, mood apps, nutritional modifications, etc). Pre-existing essential hypertension affecting pregnancy in third trimester 2017     Priority: Medium     Overview Note:      Diagnosed  in ER w/ \"stroke range bps\" per pt report  No longer taking Norvasc --pt self Dc'd around  and no meds since  No hx GISELA w/ G1/G2   Bp wnl on preg intake  Baseline HELLP labs within normal limits  Baseline PC ratio 0.1   TSH wnl  Last EKG wnl 2018   ASA 162mg recommended  22 at [place]: /82  22 at [place]: BP WNL  10/25/22 at [place]: BP /74    22 [place]: BP WNL, no PreE symptoms. Assessment & Plan Note:     Patient has Chronic HTN. BP stable today. Patient is currently not taking medications for blood pressure control. .   Delivery timing for Plan delivery for chronic hypertension not on meds is 38-39 weeks, all timing adjusted based on maternal and fetal condition. Recommend adding or changing medication prn to keep SBP < 140, DBP < 90. Low dose Aspirin (162 mg daily) daily for the prevention of preeclampsia in high risk women. Recommend serial growth ultrasounds in third trimester, as well as  testing for maternal and fetal well-being surveillance.      Close monitoring of BP through 2 weeks postpartum to maintain <150/100. Recommend in office BP check day 5-7 after delivery. Recommend 20mg PO lasix x 5 days beginning PPD #1. Counseling re risks of worsening in pregnancy and postpartum reviewed      History of 2  sections 2018     Priority: Low     Overview Note:     Hx LTCS x2:   G1:  LTCS w/ G1 at 38w4d w/ GHS on 2018 due to BPD estimated 10.5cm, multiple fetal anomalies, no diagnosis ever made (8#2.9) --subsequent  demise at approx 1 mo age    G4:   Repeat LTCS at 39w0d w/ Dr. Cookie Serrano on 18 (7#1.1)    *Op note from Dr. Cookie Serrano reviewed: \"adhesions, significant bleeding during case, poor exposure due to obesity/adhesions\";  \"very difficult visualization due to obesity/adhesions. Consider vertical midline skin incision if has another c/s\"    Pt states now considering TOLAC--counseled extensively and will continue to do so    9/15/22:  discussed, again, w/ pt her hx and last op note. Would not at all attempt  due to significant increased risks to both her and baby and would recommend referral to LIA for delivery if she were insistent on TOLAC. After extensive counseling pt agreeable to repeat c/s HOWEVER, states does NOT want a vertical midline incision. Pt counseled that in a life/death situation I cannot promise that a vertical incision may not be necessary.   Pt voices understanding         History of fetal anomaly in prior pregnancy, currently pregnant, third trimester 2018     Priority: Low     Overview Note:       G1:  LTCS w/ G1 at 38w4d w/ GHS on 2018 due to BPD estimated 10.5cm, multiple fetal anomalies, no diagnosis ever made (8#2.9) --subsequent  demise at approx 1 mo age    (increased risk for NTD w/ future babies-->needs extra folic acid until end of 1st trimester (rx Folate 4mg every day)        History of neural tube defect in infant in prior pregnancy, currently pregnant 2018     Priority: Low Overview Note:      See Hx of Fetal Anomaly Overview        Obesity affecting pregnancy in third trimester 07/11/2017     Priority: Low     Overview Note:     Pregravid BMI 37  No history of gestational diabetes  Hemoglobin A1c elevated at 5.9--> needs early 2-hour glucose tolerance test at 16-18wks  Please schedule  States wants a more \"natural\" option then Glucola--counseled on option of checking fsbg  --> no hx of GDM, will plan routine timing of glycemic control at 24-28wk with glucose checking (8/29/2022 [facility])      Prefers to do a 2hr 75mg glucola drink off of [facility] website at 28wks (pt will buy it herself on their websitea and bring w/ her) - PASSED      Gastroesophageal reflux in pregnancy 02/03/2023     Overview Note:     02/03/23 [facility]: Increase in symptoms; Tums not effective; Pepcid 20 mg sent in.        R c/s scheduled. Addressed normal pregnancy complaints, reassured and offered suggestions for care  Reviewed gestational age precautions and activity goals/limitations  Nutritional counseling as well as specific goals based on current maternal and fetal status  Options for GERD therapy if becomes symptomatic over course of pregnancy  Gestational age appropriate preventive care regarding communicable disease transmission and vaccines as appropriate (including flu, TDaP, and COVID.)  Additional plans and concerns as documented in problem list.   All questions answered and concerns discussed. An electronic signature was used to authenticate this note. Erica García MD    I have spent 37 minutes reviewing previous notes, test results and face to face with the patient discussing the diagnosis and importance of compliance with the treatment plan, in addition to ultrasound findings, as well as documenting on the day of the visit (02/03/2023). Return if symptoms worsen or fail to improve.     Patient Active Problem List   Diagnosis Code    Supervision of high risk pregnancy in third trimester O09.93 Obesity affecting pregnancy in third trimester O99.213    History of 2  sections Z98.891    History of fetal anomaly in prior pregnancy, currently pregnant, third trimester O09.293    Pre-existing essential hypertension affecting pregnancy in third trimester O10.013    History of neural tube defect in infant in prior pregnancy, currently pregnant O09.299    Anxiety during pregnancy O99.340, F41.9    Maternal arrhythmia affecting pregnancy, antepartum, third trimester O99.413, I49.9    Pelvic adhesive disease N73.6    Anemia during pregnancy in third trimester O99.013    History of fetal anomaly in prior pregnancy, currently pregnant in third trimester O09.293    Gastroesophageal reflux in pregnancy O99.619, K21.9     Visit Diagnoses         Codes    36 weeks gestation of pregnancy     Z3A.36    BMI 38.0-38.9,adult     Z68.38

## 2023-02-06 NOTE — ASSESSMENT & PLAN NOTE
Mood evaluated today; PHQ2 reviewed. Counseling re possibility of peripartum worsening. Mood Reassuring today  Recommend lifestyle/behavioral modifications to enhance mood (exercise, sunshine, mood apps, nutritional modifications, etc).

## 2023-02-06 NOTE — ASSESSMENT & PLAN NOTE
Patient has Chronic HTN. BP stable today. Patient is currently not taking medications for blood pressure control. .   Delivery timing for Plan delivery for chronic hypertension not on meds is 38-39 weeks, all timing adjusted based on maternal and fetal condition. · Recommend adding or changing medication prn to keep SBP < 140, DBP < 90. · Low dose Aspirin (162 mg daily) daily for the prevention of preeclampsia in high risk women. · Recommend serial growth ultrasounds in third trimester, as well as  testing for maternal and fetal well-being surveillance. · Close monitoring of BP through 2 weeks postpartum to maintain <150/100. · Recommend in office BP check day 5-7 after delivery. · Recommend 20mg PO lasix x 5 days beginning PPD #1.      Counseling re risks of worsening in pregnancy and postpartum reviewed

## 2023-02-09 ENCOUNTER — ROUTINE PRENATAL (OUTPATIENT)
Dept: OBGYN CLINIC | Age: 33
End: 2023-02-09

## 2023-02-09 VITALS — DIASTOLIC BLOOD PRESSURE: 74 MMHG | SYSTOLIC BLOOD PRESSURE: 128 MMHG | WEIGHT: 225 LBS | BODY MASS INDEX: 38.62 KG/M2

## 2023-02-09 DIAGNOSIS — O09.293 HISTORY OF FETAL ANOMALY IN PRIOR PREGNANCY, CURRENTLY PREGNANT, THIRD TRIMESTER: Primary | ICD-10-CM

## 2023-02-09 DIAGNOSIS — O10.013 PRE-EXISTING ESSENTIAL HYPERTENSION AFFECTING PREGNANCY IN THIRD TRIMESTER: ICD-10-CM

## 2023-02-09 DIAGNOSIS — O09.93 SUPERVISION OF HIGH RISK PREGNANCY IN THIRD TRIMESTER: ICD-10-CM

## 2023-02-09 DIAGNOSIS — O99.213 OBESITY AFFECTING PREGNANCY IN THIRD TRIMESTER: ICD-10-CM

## 2023-02-09 DIAGNOSIS — O99.340 ANXIETY DURING PREGNANCY: ICD-10-CM

## 2023-02-09 DIAGNOSIS — Z36.85 SCREENING, ANTENATAL, FOR STREPTOCOCCUS B: ICD-10-CM

## 2023-02-09 DIAGNOSIS — F41.9 ANXIETY DURING PREGNANCY: ICD-10-CM

## 2023-02-09 PROCEDURE — 99902 PR PRENATAL VISIT: CPT | Performed by: OBSTETRICS & GYNECOLOGY

## 2023-02-09 NOTE — PROGRESS NOTES
Nicolette Leo 37w0d    Denies LOF, VB, Ctxs. Good FM. Scheduled for RLTCD 23    Vitals:    23 0851   BP: 128/74     Weight Metrics 2023 2023 2023 2022 12/15/2022 2022 2022   Weight 225 lb 222 lb 218 lb 219 lb 222 lb 222 lb 224 lb   BMI (Calculated) 0 kg/m2 0 kg/m2 0 kg/m2 37.7 kg/m2 0 kg/m2 0 kg/m2 38.5 kg/m2     TW lb    FHTs+++  GBS collected. 1. History of fetal anomaly in prior pregnancy, currently pregnant, third trimester    2. Supervision of high risk pregnancy in third trimester    3. Pre-existing essential hypertension affecting pregnancy in third trimester    4. Obesity affecting pregnancy in third trimester    5. Anxiety during pregnancy      CHTN - pt is not on meds. Discussed r/b/a to 2x wk testing - pt declines 2x wk testing. Strict BP precautions and kick counts reviewed. Previous  w/ severe scar tissue:  Pt requested to talk about dates for her RLTCD last appt - desires 23. Pt is scheduled. Pt had questions regarding whether or not I would perform a vertical skin incision if I performed her delivery. At first, I presumed her prior counseling by my partners was about a vertical incision on her uterus, however, after reading notes below, pt has been counseled that vertical skin incision has been recommended based on how severe her adhesions were in her last . Pt is adamant that she does not want to have a vertical skin incision. Discussed that she may be at increased risk of bowel injury that could result in potential to have to have part of her bowel resected and reanastomosed if possible, but ultimately could end up with a temporary colostomy, etc if bowel injury occurred due to her adhesions and potential for very poor visibility from severe adhesions.  Pt expressed understanding that injury to bowel or bladder or worse could occur and having limited visibility from horizontal incision may hinder - but ultimately, if she refuses vertical incision, I would try my best to avoid. Additionally, I discussed that there may be difficulty getting baby out with either type of skin incision if adhesions are so severe. Pt expressed understanding. Pt continues to be adamant that she refuses a vertical incision and states she desires a horizontal incision consistent with her previous and is aware that she may have a higher risk of bladder, bowel, ureter, etc injury and other increased risks due to this for her and her baby. Will have her return in 1wks, earlier if any issues. Charlesck counts reviewed. - CB    2hr GTT wnl   S/p Rhogam on 11/29/22         10/25/22 at [facility]: Normal anatomy and echo; AC 31%, EVIE 16.8cm, CL 4.5  2022 at [facility]: Appropriate growth; AC 21%, Overall 43%, EVIE 13.3 cm, UA Dopplers WNL, BPP . OB hx:  G1:  LTCS w/ G1 at 38w4d w/ [facility] on 2018 due to BPD estimated 10.5cm, multiple fetal anomalies, NTD; no diagnosis ever made (8#2.9) --subsequent  demise at approx 1 mo age     G4:   Repeat LTCS at 39w0d w/ Dr. Jaquez See on 18 (7#1.1)                   [address] [facility] [address]  Diagnosed  in ER w/ \"stroke range bps\" per pt report  No longer taking Norvasc --pt self Dc'd around  and no meds since  No hx GISELA w/ G1/G2   Bp wnl today  Baseline HELLP labs within normal limits  Baseline PC ratio 0.1    Last EKG wnl 2018   TSH wnl   ASA 162mg recommended           Hx LTCS x2:   Op note from Dr. Jaquez See reviewed: \"adhesions, significant bleeding during case, poor exposure due to obesity/adhesions\";  \"very difficult visualization due to obesity/adhesions. Consider vertical midline skin incision if has another c/s\" Pt was considering TOLAC--counseled extensively and will continue to do so. 9/15/22:  discussed, again, w/ pt her hx and last op note. Would not at all attempt  due to significant increased risks to both her and baby and would recommend referral to LIA for delivery if she were insistent on TOLAC. After extensive counseling pt agreeable to repeat c/s HOWEVER, states does NOT want a vertical midline incision. Pt counseled that in a life/death situation I cannot promise that a vertical incision may not be necessary. Pt voices understanding. BMI 37 (pregravid):  Hgb A1C 5.9   No hx GDM   States wants a more \"natural\" option then Glucola--counseled on option of checking fsbg   2hr 75mg glucola drink off of [facility] website wnl          Anxiety:   8/29/22 at [facility]: Pt reports increase anxiety being in our office due to hx of anomalies in previous pregnancy 4 years ago. Reports feeling \"down\" and laying around at home. Feeling \"extra irritable\". Discussed starting SSRI. Start Zoloft 50 mg po daily as desired  10/18:  stable w/ out meds currently   11/29:  stable w/out meds  12/15/22:  stable         Heart palpitations:  has apple watch and has been as high as in the 180s on occasion (reviewed watch and has had several episodes of 140's +)   Denies having this prior to pregnancy  Denies any assoc CP/SOB, calf pain/swelling   Some anxiety but denies anything excessive   Denies any excessive caffeine intake  Does use Cocoa butter daily  Discussed CV changes in pregnancy and progesterone effect on smooth mm   S/p referral to cards w/ Holter monitor    Echo (11/11/22): wnl; EF 55 - 60%. See Cards note 11/22/22:  \"Persists- pvcs on monitor- trial of mag if ok with OBGYN\"   Follow-up with Cardiology on 2/24/2023.    Rx Magnesium oxide given 11/29/22 (400mg BID ) ---taking         Mild Anemia:  Hgb 10.9 (11/29)  Instructed to start  FeSO4 325 mg w/ vitamin C 500 mg every other day

## 2023-02-13 LAB
BACTERIA SPEC CULT: NORMAL
SERVICE CMNT-IMP: NORMAL

## 2023-02-16 ENCOUNTER — ROUTINE PRENATAL (OUTPATIENT)
Dept: OBGYN CLINIC | Age: 33
End: 2023-02-16

## 2023-02-16 VITALS
WEIGHT: 224 LBS | HEIGHT: 64 IN | BODY MASS INDEX: 38.24 KG/M2 | SYSTOLIC BLOOD PRESSURE: 124 MMHG | DIASTOLIC BLOOD PRESSURE: 80 MMHG

## 2023-02-16 DIAGNOSIS — O09.93 SUPERVISION OF HIGH RISK PREGNANCY IN THIRD TRIMESTER: ICD-10-CM

## 2023-02-16 DIAGNOSIS — O10.013 PRE-EXISTING ESSENTIAL HYPERTENSION AFFECTING PREGNANCY IN THIRD TRIMESTER: ICD-10-CM

## 2023-02-16 DIAGNOSIS — O99.340 ANXIETY DURING PREGNANCY: ICD-10-CM

## 2023-02-16 DIAGNOSIS — F41.9 ANXIETY DURING PREGNANCY: ICD-10-CM

## 2023-02-16 DIAGNOSIS — O99.213 OBESITY AFFECTING PREGNANCY IN THIRD TRIMESTER: ICD-10-CM

## 2023-02-16 DIAGNOSIS — O09.293 HISTORY OF FETAL ANOMALY IN PRIOR PREGNANCY, CURRENTLY PREGNANT, THIRD TRIMESTER: Primary | ICD-10-CM

## 2023-02-16 PROCEDURE — 99902 PR PRENATAL VISIT: CPT | Performed by: OBSTETRICS & GYNECOLOGY

## 2023-02-17 NOTE — PROGRESS NOTES
Chief Complaint   Patient presents with    Routine Prenatal Visit     KEYSHAWN  38w0d    Denies LOF, VB, Ctxs. Good FM. Vitals:    23 0853   BP: 124/80     Weight Metrics 2023 2023 2023 2023 2022 12/15/2022 2022   Weight 224 lb 225 lb 222 lb 218 lb 219 lb 222 lb 222 lb   BMI (Calculated) 38.5 kg/m2 0 kg/m2 0 kg/m2 0 kg/m2 37.7 kg/m2 0 kg/m2 0 kg/m2     TW lb    FHTs+++  Cephalic    1. History of fetal anomaly in prior pregnancy, currently pregnant, third trimester    2. Supervision of high risk pregnancy in third trimester    3. Pre-existing essential hypertension affecting pregnancy in third trimester    4. Obesity affecting pregnancy in third trimester    5. Anxiety during pregnancy      CHTN - pt is not on meds. Discussed r/b/a to 2x wk testing - pt declines 2x wk testing. Strict BP precautions and kick counts reviewed. Previous  w/ severe scar tissue:  Pt requested to talk about dates for her RLTCD last appt - desires 23. Will need to be scheduled. Pt had questions regarding whether or not I would perform a vertical skin incision if I performed her delivery. At first, I presumed her prior counseling by my partners was about a vertical incision on her uterus, however, after reading notes below, pt has been counseled that vertical skin incision has been recommended based on how severe her adhesions were in her last . Pt is adamant that she does not want to have a vertical skin incision. Discussed that she may be at increased risk of bowel injury that could result in potential to have to have part of her bowel resected and reanastomosed if possible, but ultimately could end up with a temporary colostomy, etc if bowel injury occurred due to her adhesions and potential for very poor visibility from severe adhesions.  Pt expressed understanding that injury to bowel or bladder or worse could occur and having limited visibility from horizontal incision may hinder - but ultimately, if she refuses vertical incision, I would try my best to avoid. Additionally, I discussed that there may be difficulty getting baby out with either type of skin incision if adhesions are so severe. Pt expressed understanding. Will have her return in 1wks, earlier if any issues. Pt insists on pfannenstiel incision & this will be performed. Kick counts reviewed. - CB    2hr GTT wnl   S/p Rhogam on 11/29/22         10/25/22 at [facility]: Normal anatomy and echo; AC 31%, EVIE 16.8cm, CL 4.5  2022 at [facility]: Appropriate growth; AC 21%, Overall 43%, EVIE 13.3 cm, UA Dopplers WNL, BPP . OB hx:  G1:  LTCS w/ G1 at 38w4d w/ [facility] on 2018 due to BPD estimated 10.5cm, multiple fetal anomalies, NTD; no diagnosis ever made (8#2.9) --subsequent  demise at approx 1 mo age     G4:   Repeat LTCS at 39w0d w/ Dr. Kaden Her on 18 (7#1.1)                   [address] [facility] [address]  Diagnosed  in ER w/ \"stroke range bps\" per pt report  No longer taking Norvasc --pt self Dc'd around  and no meds since  No hx GISELA w/ G1/G2   Bp wnl today  Baseline HELLP labs within normal limits  Baseline PC ratio 0.1    Last EKG wnl 2018   TSH wnl   ASA 162mg recommended           Hx LTCS x2:   Op note from Dr. Kaden Her reviewed: \"adhesions, significant bleeding during case, poor exposure due to obesity/adhesions\";  \"very difficult visualization due to obesity/adhesions. Consider vertical midline skin incision if has another c/s\" Pt was considering TOLAC--counseled extensively and will continue to do so. 9/15/22:  discussed, again, w/ pt her hx and last op note. Would not at all attempt  due to significant increased risks to both her and baby and would recommend referral to LIA for delivery if she were insistent on TOLAC. After extensive counseling pt agreeable to repeat c/s HOWEVER, states does NOT want a vertical midline incision.   Pt counseled that in a life/death situation I cannot promise that a vertical incision may not be necessary. Pt voices understanding. BMI 37 (pregravid):  Hgb A1C 5.9   No hx GDM   States wants a more \"natural\" option then Glucola--counseled on option of checking fsbg   2hr 75mg glucola drink off of [facility] website wnl          Anxiety:   8/29/22 at [facility]: Pt reports increase anxiety being in our office due to hx of anomalies in previous pregnancy 4 years ago. Reports feeling \"down\" and laying around at home. Feeling \"extra irritable\". Discussed starting SSRI. Start Zoloft 50 mg po daily as desired  10/18:  stable w/ out meds currently   11/29:  stable w/out meds  12/15/22:  stable         Heart palpitations:  has apple watch and has been as high as in the 180s on occasion (reviewed watch and has had several episodes of 140's +)   Denies having this prior to pregnancy  Denies any assoc CP/SOB, calf pain/swelling   Some anxiety but denies anything excessive   Denies any excessive caffeine intake  Does use Cocoa butter daily  Discussed CV changes in pregnancy and progesterone effect on smooth mm   S/p referral to cards w/ Holter monitor    Echo (11/11/22): wnl; EF 55 - 60%. See Cards note 11/22/22:  \"Persists- pvcs on monitor- trial of mag if ok with OBGYN\"   Follow-up with Cardiology on 2/24/2023.    Rx Magnesium oxide given 11/29/22 (400mg BID ) ---taking         Mild Anemia:  Hgb 10.9 (11/29)  Instructed to start  FeSO4 325 mg w/ vitamin C 500 mg every other day

## 2023-02-24 ENCOUNTER — ANESTHESIA EVENT (OUTPATIENT)
Dept: SURGERY | Age: 33
End: 2023-02-24
Payer: COMMERCIAL

## 2023-02-24 ENCOUNTER — HOSPITAL ENCOUNTER (INPATIENT)
Age: 33
LOS: 3 days | Discharge: HOME | End: 2023-02-27
Attending: OBSTETRICS & GYNECOLOGY | Admitting: OBSTETRICS & GYNECOLOGY
Payer: COMMERCIAL

## 2023-02-24 ENCOUNTER — ANESTHESIA (OUTPATIENT)
Dept: SURGERY | Age: 33
End: 2023-02-24
Payer: COMMERCIAL

## 2023-02-24 DIAGNOSIS — O34.219 PREVIOUS CESAREAN SECTION COMPLICATING PREGNANCY: Primary | ICD-10-CM

## 2023-02-24 PROBLEM — Z3A.39 39 WEEKS GESTATION OF PREGNANCY: Status: ACTIVE | Noted: 2023-02-24

## 2023-02-24 LAB
ABO + RH BLD: NORMAL
BLOOD GROUP ANTIBODIES SERPL: NORMAL
ERYTHROCYTE [DISTWIDTH] IN BLOOD BY AUTOMATED COUNT: 13.6 % (ref 11.9–14.6)
HCT VFR BLD AUTO: 31.8 % (ref 35.8–46.3)
HGB BLD-MCNC: 11 G/DL (ref 11.7–15.4)
MCH RBC QN AUTO: 29.5 PG (ref 26.1–32.9)
MCHC RBC AUTO-ENTMCNC: 34.6 G/DL (ref 31.4–35)
MCV RBC AUTO: 85.3 FL (ref 82–102)
NRBC # BLD: 0 K/UL (ref 0–0.2)
PLATELET # BLD AUTO: 145 K/UL (ref 150–450)
PMV BLD AUTO: 11.2 FL (ref 9.4–12.3)
RBC # BLD AUTO: 3.73 M/UL (ref 4.05–5.2)
SPECIMEN EXP DATE BLD: NORMAL
WBC # BLD AUTO: 7 K/UL (ref 4.3–11.1)

## 2023-02-24 PROCEDURE — 85027 COMPLETE CBC AUTOMATED: CPT

## 2023-02-24 PROCEDURE — 59510 CESAREAN DELIVERY: CPT | Performed by: OBSTETRICS & GYNECOLOGY

## 2023-02-24 PROCEDURE — 86900 BLOOD TYPING SEROLOGIC ABO: CPT

## 2023-02-24 PROCEDURE — 3600000014 HC SURGERY LEVEL 4 ADDTL 15MIN: Performed by: OBSTETRICS & GYNECOLOGY

## 2023-02-24 PROCEDURE — 2580000003 HC RX 258: Performed by: NURSE ANESTHETIST, CERTIFIED REGISTERED

## 2023-02-24 PROCEDURE — 2720000010 HC SURG SUPPLY STERILE: Performed by: OBSTETRICS & GYNECOLOGY

## 2023-02-24 PROCEDURE — 3700000000 HC ANESTHESIA ATTENDED CARE: Performed by: OBSTETRICS & GYNECOLOGY

## 2023-02-24 PROCEDURE — 7100000000 HC PACU RECOVERY - FIRST 15 MIN: Performed by: OBSTETRICS & GYNECOLOGY

## 2023-02-24 PROCEDURE — 59025 FETAL NON-STRESS TEST: CPT

## 2023-02-24 PROCEDURE — 7100000001 HC PACU RECOVERY - ADDTL 15 MIN: Performed by: OBSTETRICS & GYNECOLOGY

## 2023-02-24 PROCEDURE — 2580000003 HC RX 258: Performed by: OBSTETRICS & GYNECOLOGY

## 2023-02-24 PROCEDURE — 2500000003 HC RX 250 WO HCPCS: Performed by: NURSE ANESTHETIST, CERTIFIED REGISTERED

## 2023-02-24 PROCEDURE — 6360000002 HC RX W HCPCS: Performed by: NURSE ANESTHETIST, CERTIFIED REGISTERED

## 2023-02-24 PROCEDURE — 6370000000 HC RX 637 (ALT 250 FOR IP): Performed by: ANESTHESIOLOGY

## 2023-02-24 PROCEDURE — 2500000003 HC RX 250 WO HCPCS: Performed by: ANESTHESIOLOGY

## 2023-02-24 PROCEDURE — 1100000000 HC RM PRIVATE

## 2023-02-24 PROCEDURE — 6360000002 HC RX W HCPCS: Performed by: ANESTHESIOLOGY

## 2023-02-24 PROCEDURE — 3700000001 HC ADD 15 MINUTES (ANESTHESIA): Performed by: OBSTETRICS & GYNECOLOGY

## 2023-02-24 PROCEDURE — 3600000004 HC SURGERY LEVEL 4 BASE: Performed by: OBSTETRICS & GYNECOLOGY

## 2023-02-24 PROCEDURE — 6370000000 HC RX 637 (ALT 250 FOR IP): Performed by: OBSTETRICS & GYNECOLOGY

## 2023-02-24 PROCEDURE — 6360000002 HC RX W HCPCS: Performed by: OBSTETRICS & GYNECOLOGY

## 2023-02-24 PROCEDURE — 2709999900 HC NON-CHARGEABLE SUPPLY: Performed by: OBSTETRICS & GYNECOLOGY

## 2023-02-24 RX ORDER — ONDANSETRON 2 MG/ML
4 INJECTION INTRAMUSCULAR; INTRAVENOUS EVERY 6 HOURS PRN
Status: DISCONTINUED | OUTPATIENT
Start: 2023-02-24 | End: 2023-02-25 | Stop reason: ALTCHOICE

## 2023-02-24 RX ORDER — KETOROLAC TROMETHAMINE 30 MG/ML
INJECTION, SOLUTION INTRAMUSCULAR; INTRAVENOUS PRN
Status: DISCONTINUED | OUTPATIENT
Start: 2023-02-24 | End: 2023-02-24 | Stop reason: SDUPTHER

## 2023-02-24 RX ORDER — SODIUM CHLORIDE 9 MG/ML
INJECTION, SOLUTION INTRAVENOUS PRN
Status: DISCONTINUED | OUTPATIENT
Start: 2023-02-24 | End: 2023-02-27 | Stop reason: HOSPADM

## 2023-02-24 RX ORDER — SODIUM CHLORIDE, SODIUM LACTATE, POTASSIUM CHLORIDE, CALCIUM CHLORIDE 600; 310; 30; 20 MG/100ML; MG/100ML; MG/100ML; MG/100ML
INJECTION, SOLUTION INTRAVENOUS CONTINUOUS
Status: DISCONTINUED | OUTPATIENT
Start: 2023-02-24 | End: 2023-02-25 | Stop reason: ALTCHOICE

## 2023-02-24 RX ORDER — DIPHENHYDRAMINE HYDROCHLORIDE 50 MG/ML
12.5 INJECTION INTRAMUSCULAR; INTRAVENOUS EVERY 6 HOURS PRN
Status: DISCONTINUED | OUTPATIENT
Start: 2023-02-24 | End: 2023-02-25 | Stop reason: ALTCHOICE

## 2023-02-24 RX ORDER — ONDANSETRON 4 MG/1
4 TABLET, ORALLY DISINTEGRATING ORAL ONCE
Status: COMPLETED | OUTPATIENT
Start: 2023-02-24 | End: 2023-02-24

## 2023-02-24 RX ORDER — HYDROMORPHONE HYDROCHLORIDE 1 MG/ML
1 INJECTION, SOLUTION INTRAMUSCULAR; INTRAVENOUS; SUBCUTANEOUS EVERY 4 HOURS PRN
Status: DISCONTINUED | OUTPATIENT
Start: 2023-02-24 | End: 2023-02-25 | Stop reason: ALTCHOICE

## 2023-02-24 RX ORDER — SODIUM CHLORIDE 0.9 % (FLUSH) 0.9 %
5-40 SYRINGE (ML) INJECTION EVERY 12 HOURS SCHEDULED
Status: DISCONTINUED | OUTPATIENT
Start: 2023-02-24 | End: 2023-02-24 | Stop reason: HOSPADM

## 2023-02-24 RX ORDER — SODIUM CHLORIDE 0.9 % (FLUSH) 0.9 %
5-40 SYRINGE (ML) INJECTION PRN
Status: DISCONTINUED | OUTPATIENT
Start: 2023-02-24 | End: 2023-02-25 | Stop reason: ALTCHOICE

## 2023-02-24 RX ORDER — PROCHLORPERAZINE EDISYLATE 5 MG/ML
5 INJECTION INTRAMUSCULAR; INTRAVENOUS EVERY 6 HOURS PRN
Status: DISCONTINUED | OUTPATIENT
Start: 2023-02-24 | End: 2023-02-25 | Stop reason: ALTCHOICE

## 2023-02-24 RX ORDER — SODIUM CHLORIDE, SODIUM LACTATE, POTASSIUM CHLORIDE, AND CALCIUM CHLORIDE .6; .31; .03; .02 G/100ML; G/100ML; G/100ML; G/100ML
1000 INJECTION, SOLUTION INTRAVENOUS ONCE
Status: DISCONTINUED | OUTPATIENT
Start: 2023-02-24 | End: 2023-02-27 | Stop reason: HOSPADM

## 2023-02-24 RX ORDER — SODIUM CHLORIDE, SODIUM LACTATE, POTASSIUM CHLORIDE, CALCIUM CHLORIDE 600; 310; 30; 20 MG/100ML; MG/100ML; MG/100ML; MG/100ML
INJECTION, SOLUTION INTRAVENOUS CONTINUOUS
Status: DISCONTINUED | OUTPATIENT
Start: 2023-02-24 | End: 2023-02-27 | Stop reason: HOSPADM

## 2023-02-24 RX ORDER — OXYCODONE HYDROCHLORIDE 5 MG/1
10 TABLET ORAL EVERY 4 HOURS PRN
Status: DISCONTINUED | OUTPATIENT
Start: 2023-02-24 | End: 2023-02-27 | Stop reason: HOSPADM

## 2023-02-24 RX ORDER — SODIUM CHLORIDE 0.9 % (FLUSH) 0.9 %
10 SYRINGE (ML) INJECTION PRN
Status: DISCONTINUED | OUTPATIENT
Start: 2023-02-24 | End: 2023-02-27 | Stop reason: HOSPADM

## 2023-02-24 RX ORDER — SODIUM CHLORIDE, SODIUM LACTATE, POTASSIUM CHLORIDE, CALCIUM CHLORIDE 600; 310; 30; 20 MG/100ML; MG/100ML; MG/100ML; MG/100ML
INJECTION, SOLUTION INTRAVENOUS CONTINUOUS PRN
Status: DISCONTINUED | OUTPATIENT
Start: 2023-02-24 | End: 2023-02-24 | Stop reason: SDUPTHER

## 2023-02-24 RX ORDER — SODIUM CHLORIDE 9 MG/ML
INJECTION, SOLUTION INTRAVENOUS PRN
Status: DISCONTINUED | OUTPATIENT
Start: 2023-02-24 | End: 2023-02-24 | Stop reason: HOSPADM

## 2023-02-24 RX ORDER — EPHEDRINE SULFATE/0.9% NACL/PF 50 MG/5 ML
SYRINGE (ML) INTRAVENOUS PRN
Status: DISCONTINUED | OUTPATIENT
Start: 2023-02-24 | End: 2023-02-24 | Stop reason: SDUPTHER

## 2023-02-24 RX ORDER — SODIUM CHLORIDE 9 MG/ML
INJECTION, SOLUTION INTRAVENOUS PRN
Status: DISCONTINUED | OUTPATIENT
Start: 2023-02-24 | End: 2023-02-25 | Stop reason: ALTCHOICE

## 2023-02-24 RX ORDER — SODIUM CHLORIDE 0.9 % (FLUSH) 0.9 %
10 SYRINGE (ML) INJECTION EVERY 12 HOURS SCHEDULED
Status: DISCONTINUED | OUTPATIENT
Start: 2023-02-24 | End: 2023-02-26 | Stop reason: ALTCHOICE

## 2023-02-24 RX ORDER — SODIUM CHLORIDE, SODIUM LACTATE, POTASSIUM CHLORIDE, CALCIUM CHLORIDE 600; 310; 30; 20 MG/100ML; MG/100ML; MG/100ML; MG/100ML
INJECTION, SOLUTION INTRAVENOUS CONTINUOUS
Status: DISCONTINUED | OUTPATIENT
Start: 2023-02-24 | End: 2023-02-24 | Stop reason: HOSPADM

## 2023-02-24 RX ORDER — TRISODIUM CITRATE DIHYDRATE AND CITRIC ACID MONOHYDRATE 500; 334 MG/5ML; MG/5ML
30 SOLUTION ORAL ONCE
Status: COMPLETED | OUTPATIENT
Start: 2023-02-24 | End: 2023-02-24

## 2023-02-24 RX ORDER — SODIUM CHLORIDE 0.9 % (FLUSH) 0.9 %
5-40 SYRINGE (ML) INJECTION PRN
Status: DISCONTINUED | OUTPATIENT
Start: 2023-02-24 | End: 2023-02-24 | Stop reason: HOSPADM

## 2023-02-24 RX ORDER — KETOROLAC TROMETHAMINE 30 MG/ML
30 INJECTION, SOLUTION INTRAMUSCULAR; INTRAVENOUS EVERY 6 HOURS PRN
Status: DISCONTINUED | OUTPATIENT
Start: 2023-02-24 | End: 2023-02-25 | Stop reason: ALTCHOICE

## 2023-02-24 RX ORDER — ACETAMINOPHEN 325 MG/1
650 TABLET ORAL EVERY 6 HOURS PRN
Status: DISCONTINUED | OUTPATIENT
Start: 2023-02-24 | End: 2023-02-25 | Stop reason: ALTCHOICE

## 2023-02-24 RX ORDER — GLYCOPYRROLATE 0.2 MG/ML
INJECTION INTRAMUSCULAR; INTRAVENOUS PRN
Status: DISCONTINUED | OUTPATIENT
Start: 2023-02-24 | End: 2023-02-24 | Stop reason: SDUPTHER

## 2023-02-24 RX ORDER — MORPHINE SULFATE 0.5 MG/ML
INJECTION, SOLUTION EPIDURAL; INTRATHECAL; INTRAVENOUS
Status: DISCONTINUED | OUTPATIENT
Start: 2023-02-24 | End: 2023-02-24 | Stop reason: SDUPTHER

## 2023-02-24 RX ORDER — BUPIVACAINE HYDROCHLORIDE 7.5 MG/ML
INJECTION, SOLUTION INTRASPINAL
Status: DISCONTINUED | OUTPATIENT
Start: 2023-02-24 | End: 2023-02-24 | Stop reason: SDUPTHER

## 2023-02-24 RX ORDER — LIDOCAINE HYDROCHLORIDE 10 MG/ML
1 INJECTION, SOLUTION INFILTRATION; PERINEURAL
Status: DISCONTINUED | OUTPATIENT
Start: 2023-02-24 | End: 2023-02-24 | Stop reason: HOSPADM

## 2023-02-24 RX ORDER — SODIUM CHLORIDE 0.9 % (FLUSH) 0.9 %
5-40 SYRINGE (ML) INJECTION EVERY 12 HOURS SCHEDULED
Status: DISCONTINUED | OUTPATIENT
Start: 2023-02-24 | End: 2023-02-25 | Stop reason: ALTCHOICE

## 2023-02-24 RX ADMIN — Medication 500 ML/HR: at 11:24

## 2023-02-24 RX ADMIN — PHENYLEPHRINE HYDROCHLORIDE 100 MCG: 0.1 INJECTION, SOLUTION INTRAVENOUS at 11:14

## 2023-02-24 RX ADMIN — Medication 10 MG: at 11:10

## 2023-02-24 RX ADMIN — Medication 20 MG: at 11:13

## 2023-02-24 RX ADMIN — SODIUM CHLORIDE, SODIUM LACTATE, POTASSIUM CHLORIDE, AND CALCIUM CHLORIDE: 600; 310; 30; 20 INJECTION, SOLUTION INTRAVENOUS at 10:56

## 2023-02-24 RX ADMIN — SODIUM CHLORIDE, PRESERVATIVE FREE 10 ML: 5 INJECTION INTRAVENOUS at 18:00

## 2023-02-24 RX ADMIN — KETOROLAC TROMETHAMINE 30 MG: 30 INJECTION, SOLUTION INTRAMUSCULAR; INTRAVENOUS at 11:57

## 2023-02-24 RX ADMIN — SODIUM CHLORIDE, PRESERVATIVE FREE 10 ML: 5 INJECTION INTRAVENOUS at 17:53

## 2023-02-24 RX ADMIN — PHENYLEPHRINE HYDROCHLORIDE 50 MCG: 0.1 INJECTION, SOLUTION INTRAVENOUS at 11:12

## 2023-02-24 RX ADMIN — BUPIVACAINE HYDROCHLORIDE IN DEXTROSE 12 MG: 7.5 INJECTION, SOLUTION SUBARACHNOID at 11:04

## 2023-02-24 RX ADMIN — GLYCOPYRROLATE 0.2 MG: 0.2 INJECTION INTRAMUSCULAR; INTRAVENOUS at 11:17

## 2023-02-24 RX ADMIN — PHENYLEPHRINE HYDROCHLORIDE 50 MCG: 0.1 INJECTION, SOLUTION INTRAVENOUS at 11:11

## 2023-02-24 RX ADMIN — CEFAZOLIN 2000 MG: 10 INJECTION, POWDER, FOR SOLUTION INTRAVENOUS at 09:45

## 2023-02-24 RX ADMIN — ACETAMINOPHEN 650 MG: 325 TABLET, FILM COATED ORAL at 21:24

## 2023-02-24 RX ADMIN — OXYCODONE HYDROCHLORIDE 10 MG: 5 TABLET ORAL at 22:06

## 2023-02-24 RX ADMIN — KETOROLAC TROMETHAMINE 30 MG: 30 INJECTION, SOLUTION INTRAMUSCULAR; INTRAVENOUS at 17:51

## 2023-02-24 RX ADMIN — SODIUM CITRATE AND CITRIC ACID MONOHYDRATE 30 ML: 500; 334 SOLUTION ORAL at 09:28

## 2023-02-24 RX ADMIN — MORPHINE SULFATE 0.15 MG: 0.5 INJECTION, SOLUTION EPIDURAL; INTRATHECAL; INTRAVENOUS at 11:08

## 2023-02-24 RX ADMIN — PHENYLEPHRINE HYDROCHLORIDE 100 MCG: 0.1 INJECTION, SOLUTION INTRAVENOUS at 11:22

## 2023-02-24 RX ADMIN — ONDANSETRON 4 MG: 4 TABLET, ORALLY DISINTEGRATING ORAL at 09:30

## 2023-02-24 RX ADMIN — DIPHENHYDRAMINE HYDROCHLORIDE 12.5 MG: 50 INJECTION, SOLUTION INTRAMUSCULAR; INTRAVENOUS at 18:00

## 2023-02-24 RX ADMIN — ACETAMINOPHEN 650 MG: 325 TABLET, FILM COATED ORAL at 15:20

## 2023-02-24 ASSESSMENT — PAIN DESCRIPTION - LOCATION
LOCATION: ABDOMEN

## 2023-02-24 ASSESSMENT — PAIN SCALES - GENERAL
PAINLEVEL_OUTOF10: 1
PAINLEVEL_OUTOF10: 6
PAINLEVEL_OUTOF10: 8

## 2023-02-24 NOTE — PROGRESS NOTES
History & Physical    Name: Terry Graf MRN: [id number]  SSN: [id number]    YOB: 1990  Age: 35 y.o. Sex: female      Subjective:     Estimated Date of Delivery: 3/2/23  OB History    Para Term  AB Living   3 2 2 0 0 1   SAB IAB Ectopic Molar Multiple Live Births             2      # Outcome Date GA Lbr Raul/2nd Weight Sex Delivery Anes PTL Lv   3 Current            2 Term 18 39w0d  7 lb 1.1 oz (3.205 kg) M CS-LTranv  N ARMEN   1 Term 18 38w3d  8 lb 2.9 oz (3.71 kg) M CS-LTranv  N DEC      Birth Comments: lived 24 days. multiple fetal anomalies \"brain wasn't fully developed-hydrocephaly\"       Ms. Cheryl Hurtado admitted with pregnancy at 39w1d for  section due to previous uterine surgery. Prenatal course was normal. Please see prenatal records for details. Past Medical History:   Diagnosis Date    Abnormal Papanicolaou smear of cervix     Adenomyosis 2022    Added automatically from request for surgery [id number]    Anemia     oral Fe    Asthma     as a child    COVID-19 vaccine series not completed 2022    Has had COVID 19 infxn -- 2021 Has NOT been COVID 19 vaccinated --counseled and medical websites given for reference    Decreased fetal movement affecting management of pregnancy in third trimester, single or unspecified fetus 2023    Depression     Endometriosis 2017: dx lsc w/ [facility] POSTOPERATIVE DIAGNOSES  1. Stage IV endometriosis with involvement of the bladder flap  and adhesions to the anterior lower fundus. 2. Adhesions of the colon to the posterior lower uterine segment. 3. Bilateral adhesions of the ovaries to the pelvic side walls. 4. Endometrioma of the left ovary.     Frequent sinus infections     Heart palpitations     Hypertension     hx of; controlled with diet; no meds    Intramural leiomyoma of uterus 2017    LGSIL Pap smear of vagina     Migraine without aura, not intractable 2017 Morbid obesity ([facility] [address]     BMI-35    Tachycardia 10/17/2022     Past Surgical History:   Procedure Laterality Date     SECTION  02/2018 x2    elecitve c/s --multiple fetal anomalies, bpd estimated 10.5cm. COLPOSCOPY      GYN  2017    laparoscopy with CO2 laser of endometriosis, lysis of adhesions, ovarian cystectomy and chromotubastion     WISDOM TOOTH EXTRACTION       Social History     Occupational History    Not on file   Tobacco Use    Smoking status: Never    Smokeless tobacco: Never   Vaping Use    Vaping Use: Never used   Substance and Sexual Activity    Alcohol use: No     Alcohol/week: 1.0 standard drink    Drug use: No    Sexual activity: Yes     Partners: Male     Family History   Problem Relation Age of Onset    Colon Cancer Neg Hx     Depression Other     Diabetes Maternal Grandmother     Hypertension Maternal Grandmother     Diabetes Paternal Grandmother     Breast Cancer Neg Hx     No Known Problems Father     No Known Problems Mother     Hypertension Paternal Grandmother     Ovarian Cancer Neg Hx        Allergies   Allergen Reactions    Latex Rash    Hydromorphone Itching     Dilaudid     Prior to Admission medications    Medication Sig Start Date End Date Taking?  Authorizing Provider   calcium carbonate (TUMS) 500 MG chewable tablet Take 1 tablet by mouth daily    Historical Provider, MD   famotidine (PEPCID) 20 MG tablet Take 1 tablet by mouth 2 times daily  Patient not taking: Reported on 2023 2/3/23   Nikita Swain MD   butalbital-acetaminophen-caffeine (FIORICET, ESGIC) -34 MG per tablet Take 1 tablet by mouth every 6 hours as needed for Headaches 23   Crystal Welch MD   vitamin C (ASCORBIC ACID) 500 MG tablet Take 1 tablet by mouth daily 22   Shahnaz Walters MD   ferrous sulfate (IRON 325) 325 (65 Fe) MG tablet Take 1 tablet by mouth daily (with breakfast) 22   Shahnaz Walters MD   Magnesium 400 MG TABS Take 1 tablet by mouth 2 times daily 11/29/22   Felix Desai MD   aspirin 81 MG chewable tablet Take 162 mg by mouth daily    Historical Provider, MD   acetaminophen (TYLENOL) 500 MG tablet Take 500 mg by mouth every 6 hours as needed for Pain  Patient not taking: No sig reported    Historical Provider, MD   polyethylene glycol (GLYCOLAX) 17 GM/SCOOP powder Take 17gm once or twice a day as needed  Patient not taking: Reported on 2/24/2023 8/29/22   Sandy Vieira MD   Prenatal Vit-Fe Fum-FA-Omega (PRENATAL MULTI +DHA PO) Take by mouth    Historical Provider, MD   folic acid (FOLATE) 023 MCG tablet Take 1 tablet by mouth in the morning. 8/10/22   Felix Desai MD        Review of Systems: A comprehensive review of systems was negative except for that written in the History of Present Illness. Objective:     Vitals:  Vitals:    02/24/23 0820   BP: 123/72   Pulse: 90   Resp: 20   Temp: 99 °F (37.2 °C)   TempSrc: Oral   SpO2: 98%        Physical Exam:  Vitals reviewed. Constitutional:       General: She is not in acute distress. Appearance: Normal appearance. She is well-developed. She is not ill-appearing, toxic-appearing or diaphoretic. HENT:      Head: Normocephalic and atraumatic. Eyes:      General: No scleral icterus. Conjunctiva/sclera: Conjunctivae normal.   Pulmonary:      Effort: Pulmonary effort is normal. No respiratory distress. Cardiovascular:  Abdominal:      General: There is no distension. Musculoskeletal:         General: Normal range of motion. Skin:     General: Skin is not jaundiced or pale. Neurological:      General: No focal deficit present. Mental Status: She is alert, oriented to person, place, and time and easily aroused. Psychiatric:         Mood and Affect: Mood normal.         Speech: Speech normal.         Behavior: Behavior normal. Behavior is cooperative. Thought Content:  Thought content normal.         Judgment: Judgment normal.    Membranes:  Intact  Fetal Heart Rate: Reactive    Prenatal Labs:   Lab Results   Component Value Date/Time    ABORH O NEGATIVE 2023 08:18 AM    HIVEXT Negative 2018 12:00 AM         Impression/Plan:     Plan:  Admit for  section. Group B Strep was negative. Discussed the risks of surgery including the risks of bleeding, infection, deep vein thrombosis, and surgical injuries to internal organs including but not limited to the bowels, bladder, rectum, and female reproductive organs. The patient understands the risks; any and all questions were answered to the patient's satisfaction. Reviewed pt's refusal of a vertical midline skin incision despite her prior counseling. Pt expressed verbal understanding of increased risk of potential complications due to the documented severe adhesive disease present in prior cesareans.

## 2023-02-24 NOTE — L&D DELIVERY NOTE
LOW TRANSVERSE  SECTION   FULL OP NOTE    PATIENT: Aleah Martini  MRN: [id number]  DATE: 23          PREOPERATIVE DIAGNOSIS: 30yo  @ 39w1d with history of prior LTCD x 2, history of pelvic adhesive disease, Rh negative, for repeat section. POSTOPERATIVE DIAGNOSIS: Same    PROCEDURE:  Repeat low transverse  section    SURGEON: Lopez Abdul MD    ANESTHESIA: Spinal    ESTIMATED BLOOD LOSS:   785    SPECIMENS: None, placenta sent for disposal    FINDINGS: Live vigorous male infant weighing 6 lbs 15 oz. APGARS 8, 9. Uterus had scarring to the bladder that was taken down. Bilateral ovaries and tubes appeared normal.     PROCEDURE IN DETAIL: Informed consent was obtained prior to proceeding to the operating room. The patient was taken to the operating room where spinal anesthesia was found to be adequate. Time out was done to confirm the operating procedure, surgeon, patient and site. Once confirmed by the team, procedure was started. The patient was prepped and draped in the normal sterile fashion. A Pfannenstiel skin incision was made with a scalpel and carried down to the underlying fascia using the Bovie. The fascial incision was extended laterally. The superior aspect of the fascia was grasped with two kocher clamps and the rectus muscles were divided off. The kocher clamps were then placed on the inferior portion of fascia and the rectus were dissected off. The peritoneum was entered and extended bluntly with good visualization of the bladder. Adhesions to the uterus and the bladder were taken down with care to avoid bladder which was successful. Bladder flap was extended digitally. A bladder blade was then inserted. A low transverse uterine incision was made with the scalpel and extended laterally with blunt finger dissection. The babys head was then delivered atraumatically. The shoulders and torso followed without difficulty.  The cord was clamped and cut after allowing for delay according to protocol. The baby was handed off to the awaiting  Intensive Care Unit and respiratory staff. The placenta was then delivered. Pitocin was started IV. The uterus was exteriorized and the uterus was cleared of all clots and debris. The uterine incision was closed in two layers; the first layer was a running locked layer of 0 Vicryl and the second layer was an imbricating layer of 0 Vicryl. Good hemostasis was assured. Paracolic gutters were washed with warm normal saline. The uterus was returned to the abdomen. The uterine incision was inspected again and found to have hemostasis other than at the left edge which was alleviated with a figure of eight of vicryl suture. Surgicel powder was placed over top. The peritoneum was then closed with 2-0 Vicryl in a running fashion. The fascia was closed with 0-Vicryl in a running fashion. Good hemostasis was assured throughout. The subcuticular layers were reapproximated with 2-0 plain gut in an interrupted fashion. The skin was closed with 4-0 monocryl on a Jm needle in a subcuticular fashion. The patient tolerated the procedure well. Sponge, lap, and needle counts were correct times two. The patient and infant were taken to the recovery in stable condition. COMPLICATIONS: None    Arik Cuevas MD FACOG      Mother's Information      Labor Events     Labor?: No  Cervical Ripening:   Now               Obdulio Byrd [[id number]]      Labor Events     Labor?: No   Steroids?: None  Cervical Ripening Date/Time:     Antibiotics Received during Labor?: No  Rupture Identifier: Sac 1   Rupture Date/Time: 23 11:24:00   Rupture Type: AROM  Fluid Color: Clear  Fluid Odor: None  Fluid Volume:  Moderate  Induction: None  Augmentation: None  Labor Complications: None       Anesthesia    Method: Spinal       Start Pushing      Labor onset date/time:   Now     Dilation complete date/time:   Now Start pushing date/time:    Decision date/time (emergent ):           Delivery ([place])      Delivery Date/Time:  23 11:24:35   Delivery Type: , Low Transverse    Details:  Trial of Labor?: No    Categorization: Repeat    Priority: Scheduled   Indications for : Prior Uterine Surgery   Skin Incision Type: Pfannenstiel     Uterine Incision: Low Transverse              Presentation    Presentation: Vertex       Shoulder Dystocia    Shoulder Dystocia Present?: No  Add Second Maneuver  Add Third Maneuver  Add Fourth Maneuver  Add Fifth Maneuver  Add Sixth Maneuver  Add Seventh Maneuver  Add Eighth Maneuver  Add Ninth Maneuver       Assisted Delivery Details    Forceps Attempted?: No  Vacuum Extractor Attempted?: No       Document Additional Attempt         Document Additional Attempt                 Cord    Vessels: 3 Vessels  Complications: None  Delayed Cord Clamping?: Yes  Cord Clamped Date/Time: 2023 11:25:38  Cord Blood Disposition: Lab  Gases Sent?: No       Placenta    Date/Time: 2023 11:26:20  Removal: Manual Removal  Appearance: Intact  Disposition: Discarded       Lacerations    Episiotomy: None  Perineal Lacerations: None  Other Lacerations: no non-perineal laceration       Vaginal Counts        Sponges Needles Instruments   Initial Counts      Final Counts      If the count is incorrect due to Intentionally Retained Foreign Object (IRFO) add the IRFO LDA in Lines/Drains.   Add LDA: Link to [facility]       Blood Loss  Mother: Nery Jacobs #[id number]     Start of Mother's Information      Delivery Blood Loss  23 1056 - 23 1253      Quantitative Blood Loss (mL) Hospital Encounter 785 grams    Total  785 mL               End of Mother's Information  Mother: Nery Jacobs #[id number]                Delivery Providers    Delivering clinician: Evelin Araujo MD     Provider Role    Evelin Araujo MD Obstetrician    Harry Starks, RN Primary Nurse    Bre Teresa, RN Primary [place] Nurse    Mahsa Noonan MD Neonatologist    Orquidea Koch MD Anesthesiologist    Alyson Mckeon, APRN - CRNA Nurse Anesthetist    [address] [facility] [place] [place] Respiratory Therapist (Day)               Assessment    Living Status: Living  Delivery Location Comment: MAIN     Apgar Scoring Key:    0 1 2    Skin Color: Blue or pale Acrocyanotic Completely pink    Heart Rate: Absent <100 bpm >100 bpm    Reflex Irritability: No response Grimace Cry or active withdrawal    Muscle Tone: Limp Some flexion Active motion    Respiratory Effort: Absent Weak cry; hypoventilation Good, crying                      Skin Color:   Heart Rate:   Reflex Irritability:   Muscle Tone:   Respiratory Effort:    Total:            1 Minute:    0    2    2    2    2    8        Apgar 1 total from OB History    5 Minute:    1    2    2    2    2    9        Apgar 5 total from OB History    10 Minute:              15 Minute:              20 Minute:                        Apgars Assigned By: DR BRANHAM              Resuscitation    Method: Bulb Suction, Room Air, Stimulation              Measurements      Birth Weight: 3140 g Birth Length: 0.495 m     Head Circumference: 0.34 m Chest Circumference: 0.33 m              Title      Skin to Skin Initiation Date/Time:       Skin to Skin End Date/Time:

## 2023-02-24 NOTE — PROGRESS NOTES
Consents signed and witnessed. IV placed to left hand x 1 attempt. Blood drawn and sent to lab. Refused Erythromycin for baby. Consent signed for refusal.  Abd clipped and wiped. EFM and toco applied for NST. Dr Keith Saha at bedside.

## 2023-02-24 NOTE — ANESTHESIA POSTPROCEDURE EVALUATION
Department of Anesthesiology  Postprocedure Note    Patient: Kristen Echols  MRN: [id number]  YOB: 1990  Date of evaluation: 2023      Procedure Summary     Date: 23 Room / Location: [facility] [place] [facility] MAIN OR    Anesthesia Start: 1042 Anesthesia Stop: 1222    Procedure:  SECTION (Abdomen) Diagnosis:       Hx of  section      (Chronic HTN/Repeat c/s)    Surgeons: Gennaro Arita MD Responsible Provider: Jaleel Arriaga MD    Anesthesia Type: spinal ASA Status: 2          Anesthesia Type: No value filed.     Marco Phase I:      Marco Phase II:        Anesthesia Post Evaluation    Patient location during evaluation: PACU  Patient participation: complete - patient participated  Level of consciousness: awake and alert  Airway patency: patent  Nausea & Vomiting: no nausea and no vomiting  Complications: no  Cardiovascular status: hemodynamically stable  Respiratory status: acceptable, nonlabored ventilation and spontaneous ventilation  Hydration status: euvolemic  Comments: /74   Pulse 74   Temp 97.4 °F (36.3 °C) (Oral)   Resp 16   LMP 2022   SpO2 98%   Breastfeeding Unknown     Multimodal analgesia pain management approach

## 2023-02-24 NOTE — PROGRESS NOTES
Assisted pt to [facility]. Per Anjelica Aleman states not to roll yet. Pt assisted back to bed.

## 2023-02-24 NOTE — PROGRESS NOTES
Received call from Dr. Melody Castillo, no Rubella results in pt chart. Per MD can add on to blood work from this morning if possible if not can add on to tomorrow blood work, orders read back and verified. Call placed to lab, spoke with Balaji Pak per Balaji Pak can not add rubella to blood work that was drawn this morning. Order placed for Rubella to be drawn  tomorrow morning.

## 2023-02-24 NOTE — PROGRESS NOTES
Admission assessment complete see flowsheet. Pt oriented to room and call light. Pt denies pain at this time. Bleeding precautions given. Pt educated not to attempt to get OOB and to call for assistance. Laura care performed. Pt educated to keep track of her I&O's, sheet given to pt. Pt encouraged to increase fluids as tolerated. Questions encouraged and answered. Pt to call with needs/concerns. Pt in bed with call light in reach. No s/s of distress noted.

## 2023-02-24 NOTE — LACTATION NOTE
This note was copied from a baby's chart. In to see mom and infant for first time. Mom had baby swaddled on right breast in football trying to feed baby. Tried w/ mom a few minutes but baby making lots of sounds and pushing off. Gave them both a break and unswaddled baby and tried him on other breast w/ her in football hold with her permission. Assisted mom in her trying to get baby on deep and wide. Baby would get on after an attempt or two, take about 7-8 deep nutritive sucks and then stop and push off. This happened on both sides when tried. No consistent sucking. Brother came in to meet him for first time. Mom to let baby rest and try again in a little bit. Reviewed 1st 24 hr feeding/output expectations. Lactation to follow up in am and further assist as needed. No further needs at this time.

## 2023-02-24 NOTE — ANESTHESIA PROCEDURE NOTES
Spinal Block    Patient location during procedure: OR  End time: 2/24/2023 11:08 AM  Reason for block: primary anesthetic  Staffing  Performed: anesthesiologist   Anesthesiologist: Myron Huang MD  Spinal Block  Patient position: sitting  Prep: ChloraPrep  Patient monitoring: cardiac monitor, continuous pulse ox, frequent blood pressure checks and oxygen  Approach: midline  Location: L4/L5  Provider prep: mask and sterile gloves  Needle  Needle type: pencil-tip   Needle gauge: 25 G  Needle length: 3.5 in  Assessment  Sensory level: T8  Swirl obtained: Yes  CSF: clear  Attempts: 1  Hemodynamics: stable  Additional Notes  Time out at 1104  Preanesthetic Checklist  Completed: patient identified, IV checked, risks and benefits discussed, surgical/procedural consents, equipment checked, pre-op evaluation, timeout performed, anesthesia consent given, oxygen available and monitors applied/VS acknowledged

## 2023-02-24 NOTE — ANESTHESIA PRE PROCEDURE
Department of Anesthesiology  Preprocedure Note       Name:  Ember Diego   Age:  35 y.o.  :  1990                                          MRN:  [id number]         Date:  2023      Surgeon: Madan An):  MD Sandra Laws MD    Procedure: Procedure(s):   SECTION    Medications prior to admission:   Prior to Admission medications    Medication Sig Start Date End Date Taking? Authorizing Provider   calcium carbonate (TUMS) 500 MG chewable tablet Take 1 tablet by mouth daily    Historical Provider, MD   famotidine (PEPCID) 20 MG tablet Take 1 tablet by mouth 2 times daily  Patient not taking: Reported on 2023 2/3/23   Abrahan Duarte MD   butalbital-acetaminophen-caffeine (FIORICET, ESGIC) -28 MG per tablet Take 1 tablet by mouth every 6 hours as needed for Headaches 23   Fannie Whitaker MD   vitamin C (ASCORBIC ACID) 500 MG tablet Take 1 tablet by mouth daily 22   Tello Scott MD   ferrous sulfate (IRON 325) 325 (65 Fe) MG tablet Take 1 tablet by mouth daily (with breakfast) 22   Tello Scott MD   Magnesium 400 MG TABS Take 1 tablet by mouth 2 times daily 22   Tello Scott MD   aspirin 81 MG chewable tablet Take 162 mg by mouth daily    Historical Provider, MD   acetaminophen (TYLENOL) 500 MG tablet Take 500 mg by mouth every 6 hours as needed for Pain  Patient not taking: No sig reported    Historical Provider, MD   polyethylene glycol (GLYCOLAX) 17 GM/SCOOP powder Take 17gm once or twice a day as needed  Patient not taking: Reported on 2023   Abrahan Duarte MD   Prenatal Vit-Fe Fum-FA-Omega (PRENATAL MULTI +DHA PO) Take by mouth    Historical Provider, MD   folic acid (FOLATE) 796 MCG tablet Take 1 tablet by mouth in the morning.  8/10/22   Tello Scott MD       Current medications:    Current Facility-Administered Medications   Medication Dose Route Frequency Provider Last Rate Last Admin    lactated ringers IV soln infusion   IntraVENous Continuous Sandra Causey MD        lactated ringers bolus  1,000 mL IntraVENous Once Sandra Causey MD        sodium chloride flush 0.9 % injection 10 mL  10 mL IntraVENous 2 times per day Sandra Causey MD        sodium chloride flush 0.9 % injection 10 mL  10 mL IntraVENous PRN Sandra Causey MD        0.9 % sodium chloride infusion   IntraVENous PRN Sandra Causey MD        oxytocin (PITOCIN) 30 units in 500 mL infusion  87.3 kassy-units/min IntraVENous Continuous PRN Sandra Causey MD        And    oxytocin (PITOCIN) 10 unit bolus from the bag  10 Units IntraVENous PRN Sandra Causey MD        lidocaine 1 % injection 1 mL  1 mL IntraDERmal Once PRN Myron Huang MD        lactated ringers IV soln infusion   IntraVENous Continuous Myron Huang MD        sodium chloride flush 0.9 % injection 5-40 mL  5-40 mL IntraVENous 2 times per day Myron Huang MD        sodium chloride flush 0.9 % injection 5-40 mL  5-40 mL IntraVENous PRN Myron Huang MD        0.9 % sodium chloride infusion   IntraVENous PRN Myron Huang MD           Allergies:     Allergies   Allergen Reactions    Latex Rash    Hydromorphone Itching     Dilaudid       Problem List:    Patient Active Problem List   Diagnosis Code    Supervision of high risk pregnancy in third trimester O09.93    Obesity affecting pregnancy in third trimester O99.213    History of 2  sections Z98.891    History of fetal anomaly in prior pregnancy, currently pregnant, third trimester O0.36    Pre-existing essential hypertension affecting pregnancy in third trimester O10.013    History of neural tube defect in infant in prior pregnancy, currently pregnant O09.299    Anxiety during pregnancy O99.340, F41.9    Maternal arrhythmia affecting pregnancy, antepartum, third trimester O80.80, I49.9    Pelvic adhesive disease N73.6    Anemia during pregnancy in third trimester O99.013    History of fetal anomaly in prior pregnancy, currently pregnant in third trimester O09.293    Gastroesophageal reflux in pregnancy O99.619, K21.9    39 weeks gestation of pregnancy Z3A.39       Past Medical History:        Diagnosis Date    Abnormal Papanicolaou smear of cervix     Adenomyosis 2022    Added automatically from request for surgery [id number]    Anemia     oral Fe    Asthma     as a child    COVID-19 vaccine series not completed 2022    Has had COVID 19 infxn -- 2021 Has NOT been COVID 19 vaccinated --counseled and medical websites given for reference    Decreased fetal movement affecting management of pregnancy in third trimester, single or unspecified fetus 2023    Depression     Endometriosis 2017: dx lsc w/ [facility] POSTOPERATIVE DIAGNOSES  1. Stage IV endometriosis with involvement of the bladder flap  and adhesions to the anterior lower fundus. 2. Adhesions of the colon to the posterior lower uterine segment. 3. Bilateral adhesions of the ovaries to the pelvic side walls. 4. Endometrioma of the left ovary.  Frequent sinus infections     Heart palpitations     Hypertension     hx of; controlled with diet; no meds    Intramural leiomyoma of uterus 2017    LGSIL Pap smear of vagina     Migraine without aura, not intractable 2017    Morbid obesity (HCC)     BMI-35    Tachycardia 10/17/2022       Past Surgical History:        Procedure Laterality Date     SECTION  02/2018 x2    elecitve c/s --multiple fetal anomalies, bpd estimated 10.5cm.  COLPOSCOPY      GYN  2017    laparoscopy with CO2 laser of endometriosis, lysis of adhesions, ovarian cystectomy and chromotubastion     WISDOM TOOTH EXTRACTION         Social History:    Social History     Tobacco Use    Smoking status: Never    Smokeless tobacco: Never   Substance Use Topics    Alcohol use:  No Alcohol/week: 1.0 standard drink                                Counseling given: Not Answered      Vital Signs (Current):   Vitals:    02/24/23 0820 02/24/23 1221   BP: 123/72 (!) 107/57   Pulse: 90 88   Resp: 20 15   Temp: 99 °F (37.2 °C) 98.6 °F (37 °C)   TempSrc: Oral    SpO2: 98% 99%                                              BP Readings from Last 3 Encounters:   02/24/23 (!) 107/57   02/16/23 124/80   02/09/23 128/74       NPO Status: Time of last liquid consumption: 2300                        Time of last solid consumption: 2300                        Date of last liquid consumption: 02/23/23                        Date of last solid food consumption: 02/23/23    BMI:   Wt Readings from Last 3 Encounters:   02/16/23 224 lb (101.6 kg)   02/09/23 225 lb (102.1 kg)   01/27/23 222 lb (100.7 kg)     There is no height or weight on file to calculate BMI.    CBC:   Lab Results   Component Value Date/Time    WBC 7.0 02/24/2023 08:18 AM    RBC 3.73 02/24/2023 08:18 AM    HGB 11.0 02/24/2023 08:18 AM    HCT 31.8 02/24/2023 08:18 AM    MCV 85.3 02/24/2023 08:18 AM    RDW 13.6 02/24/2023 08:18 AM     02/24/2023 08:18 AM       CMP:   Lab Results   Component Value Date/Time     08/10/2022 10:50 AM    K 4.1 08/10/2022 10:50 AM     08/10/2022 10:50 AM    CO2 26 08/10/2022 10:50 AM    BUN 6 08/10/2022 10:50 AM    CREATININE 0.70 08/10/2022 10:50 AM    GFRAA >60 08/10/2022 10:50 AM    LABGLOM >60 08/10/2022 10:50 AM    GLUCOSE 84 08/10/2022 10:50 AM    PROT 7.5 08/10/2022 10:50 AM    CALCIUM 9.0 08/10/2022 10:50 AM    BILITOT 0.4 08/10/2022 10:50 AM    ALKPHOS 53 08/10/2022 10:50 AM    AST 9 08/10/2022 10:50 AM    ALT 16 08/10/2022 10:50 AM       POC Tests: No results for input(s): POCGLU, POCNA, POCK, POCCL, POCBUN, POCHEMO, POCHCT in the last 72 hours.     Coags: No results found for: PROTIME, INR, APTT    HCG (If Applicable): No results found for: PREGTESTUR, PREGSERUM, HCG, HCGQUANT     ABGs: No results found for: PHART, PO2ART, WOZ7HKF, IQQ2PQB, BEART, L5EOIEEY     Type & Screen (If Applicable):  No results found for: LABABO, LABRH    Drug/Infectious Status (If Applicable):  Lab Results   Component Value Date/Time    HEPCAB NONREACTIVE 08/10/2022 10:50 AM       COVID-19 Screening (If Applicable): No results found for: COVID19        Anesthesia Evaluation  Patient summary reviewed and Nursing notes reviewed no history of anesthetic complications:   Airway: Mallampati: II  TM distance: >3 FB   Neck ROM: full  Mouth opening: > = 3 FB   Dental: normal exam         Pulmonary:normal exam    (+) asthma (Childhood only):                            Cardiovascular:  Exercise tolerance: good (>4 METS),   (+) hypertension:,         Rhythm: regular  Rate: normal                    Neuro/Psych:   (+) headaches: migraine headaches, depression/anxiety             GI/Hepatic/Renal:   (+) GERD: no interval change,           Endo/Other: Negative Endo/Other ROS                    Abdominal:             Vascular: Other Findings:           Anesthesia Plan      spinal     ASA 2             Anesthetic plan and risks discussed with patient. Use of blood products discussed with patient whom consented to blood products.            Post-op pain plan if not by surgeon: intrathecal narcotics            Odalis Alba MD   2/24/2023

## 2023-02-25 ENCOUNTER — ANESTHESIA EVENT (OUTPATIENT)
Dept: MOTHER INFANT UNIT | Age: 33
End: 2023-02-25

## 2023-02-25 ENCOUNTER — ANESTHESIA (OUTPATIENT)
Dept: MOTHER INFANT UNIT | Age: 33
End: 2023-02-25

## 2023-02-25 LAB
BLOOD BANK CMNT PATIENT-IMP: NORMAL
ERYTHROCYTE [DISTWIDTH] IN BLOOD BY AUTOMATED COUNT: 13.7 % (ref 11.9–14.6)
FETAL SCREEN: NORMAL
HCT VFR BLD AUTO: 28.9 % (ref 35.8–46.3)
HGB BLD-MCNC: 9.6 G/DL (ref 11.7–15.4)
MCH RBC QN AUTO: 28.9 PG (ref 26.1–32.9)
MCHC RBC AUTO-ENTMCNC: 33.2 G/DL (ref 31.4–35)
MCV RBC AUTO: 87 FL (ref 82–102)
NRBC # BLD: 0 K/UL (ref 0–0.2)
PLATELET # BLD AUTO: 129 K/UL (ref 150–450)
PMV BLD AUTO: 10.4 FL (ref 9.4–12.3)
RBC # BLD AUTO: 3.32 M/UL (ref 4.05–5.2)
RUBV IGG SERPL IA-ACNC: 78.9 IU/ML
WBC # BLD AUTO: 7.9 K/UL (ref 4.3–11.1)

## 2023-02-25 PROCEDURE — 6370000000 HC RX 637 (ALT 250 FOR IP): Performed by: OBSTETRICS & GYNECOLOGY

## 2023-02-25 PROCEDURE — 1100000000 HC RM PRIVATE

## 2023-02-25 PROCEDURE — 96372 THER/PROPH/DIAG INJ SC/IM: CPT

## 2023-02-25 PROCEDURE — 85027 COMPLETE CBC AUTOMATED: CPT

## 2023-02-25 PROCEDURE — 6360000002 HC RX W HCPCS: Performed by: OBSTETRICS & GYNECOLOGY

## 2023-02-25 PROCEDURE — 6370000000 HC RX 637 (ALT 250 FOR IP): Performed by: ANESTHESIOLOGY

## 2023-02-25 PROCEDURE — 85461 HEMOGLOBIN FETAL: CPT

## 2023-02-25 PROCEDURE — 86762 RUBELLA ANTIBODY: CPT

## 2023-02-25 PROCEDURE — 36415 COLL VENOUS BLD VENIPUNCTURE: CPT

## 2023-02-25 PROCEDURE — 6360000002 HC RX W HCPCS: Performed by: ANESTHESIOLOGY

## 2023-02-25 RX ORDER — DIPHENHYDRAMINE HYDROCHLORIDE 50 MG/ML
25 INJECTION INTRAMUSCULAR; INTRAVENOUS EVERY 6 HOURS PRN
Status: DISCONTINUED | OUTPATIENT
Start: 2023-02-25 | End: 2023-02-27 | Stop reason: HOSPADM

## 2023-02-25 RX ORDER — IBUPROFEN 800 MG/1
800 TABLET ORAL EVERY 6 HOURS
Status: DISCONTINUED | OUTPATIENT
Start: 2023-02-25 | End: 2023-02-27 | Stop reason: HOSPADM

## 2023-02-25 RX ORDER — DOCUSATE SODIUM 100 MG/1
100 CAPSULE, LIQUID FILLED ORAL 2 TIMES DAILY
Status: DISCONTINUED | OUTPATIENT
Start: 2023-02-25 | End: 2023-02-27 | Stop reason: HOSPADM

## 2023-02-25 RX ORDER — SIMETHICONE 80 MG
80 TABLET,CHEWABLE ORAL EVERY 6 HOURS PRN
Status: DISCONTINUED | OUTPATIENT
Start: 2023-02-25 | End: 2023-02-27 | Stop reason: HOSPADM

## 2023-02-25 RX ORDER — SODIUM CHLORIDE 0.9 % (FLUSH) 0.9 %
5-40 SYRINGE (ML) INJECTION EVERY 12 HOURS SCHEDULED
Status: DISCONTINUED | OUTPATIENT
Start: 2023-02-25 | End: 2023-02-26 | Stop reason: ALTCHOICE

## 2023-02-25 RX ORDER — ONDANSETRON 8 MG/1
8 TABLET, ORALLY DISINTEGRATING ORAL EVERY 8 HOURS PRN
Status: DISCONTINUED | OUTPATIENT
Start: 2023-02-25 | End: 2023-02-27 | Stop reason: HOSPADM

## 2023-02-25 RX ORDER — ACETAMINOPHEN 500 MG
1000 TABLET ORAL EVERY 6 HOURS PRN
Status: DISCONTINUED | OUTPATIENT
Start: 2023-02-25 | End: 2023-02-27 | Stop reason: HOSPADM

## 2023-02-25 RX ORDER — OXYCODONE HYDROCHLORIDE 5 MG/1
5 TABLET ORAL EVERY 4 HOURS PRN
Status: DISCONTINUED | OUTPATIENT
Start: 2023-02-25 | End: 2023-02-27 | Stop reason: HOSPADM

## 2023-02-25 RX ORDER — SODIUM CHLORIDE 9 MG/ML
INJECTION, SOLUTION INTRAVENOUS PRN
Status: DISCONTINUED | OUTPATIENT
Start: 2023-02-25 | End: 2023-02-27 | Stop reason: HOSPADM

## 2023-02-25 RX ORDER — FAMOTIDINE 20 MG/1
20 TABLET, FILM COATED ORAL 2 TIMES DAILY
Status: DISCONTINUED | OUTPATIENT
Start: 2023-02-25 | End: 2023-02-27 | Stop reason: HOSPADM

## 2023-02-25 RX ORDER — POLYETHYLENE GLYCOL 3350 17 G/17G
17 POWDER, FOR SOLUTION ORAL DAILY
Status: DISCONTINUED | OUTPATIENT
Start: 2023-02-25 | End: 2023-02-27 | Stop reason: HOSPADM

## 2023-02-25 RX ORDER — SODIUM CHLORIDE 0.9 % (FLUSH) 0.9 %
5-40 SYRINGE (ML) INJECTION PRN
Status: DISCONTINUED | OUTPATIENT
Start: 2023-02-25 | End: 2023-02-27 | Stop reason: HOSPADM

## 2023-02-25 RX ORDER — SODIUM CHLORIDE, SODIUM LACTATE, POTASSIUM CHLORIDE, CALCIUM CHLORIDE 600; 310; 30; 20 MG/100ML; MG/100ML; MG/100ML; MG/100ML
INJECTION, SOLUTION INTRAVENOUS CONTINUOUS
Status: DISCONTINUED | OUTPATIENT
Start: 2023-02-25 | End: 2023-02-27 | Stop reason: HOSPADM

## 2023-02-25 RX ORDER — LANOLIN
CREAM (ML) TOPICAL
Status: DISCONTINUED | OUTPATIENT
Start: 2023-02-25 | End: 2023-02-27 | Stop reason: HOSPADM

## 2023-02-25 RX ORDER — NALOXONE HYDROCHLORIDE 0.4 MG/ML
0.4 INJECTION, SOLUTION INTRAMUSCULAR; INTRAVENOUS; SUBCUTANEOUS PRN
Status: DISCONTINUED | OUTPATIENT
Start: 2023-02-25 | End: 2023-02-27 | Stop reason: HOSPADM

## 2023-02-25 RX ADMIN — ACETAMINOPHEN 1000 MG: 500 TABLET, FILM COATED ORAL at 20:51

## 2023-02-25 RX ADMIN — SIMETHICONE 80 MG: 80 TABLET, CHEWABLE ORAL at 11:36

## 2023-02-25 RX ADMIN — RHO(D) IMMUNE GLOBULIN (HUMAN) 300 MCG: 1500 SOLUTION INTRAMUSCULAR at 14:01

## 2023-02-25 RX ADMIN — OXYCODONE HYDROCHLORIDE 10 MG: 5 TABLET ORAL at 06:05

## 2023-02-25 RX ADMIN — DOCUSATE SODIUM 100 MG: 100 CAPSULE ORAL at 11:36

## 2023-02-25 RX ADMIN — IBUPROFEN 800 MG: 800 TABLET, FILM COATED ORAL at 17:40

## 2023-02-25 RX ADMIN — OXYCODONE HYDROCHLORIDE 10 MG: 5 TABLET ORAL at 23:36

## 2023-02-25 RX ADMIN — IBUPROFEN 800 MG: 800 TABLET, FILM COATED ORAL at 23:36

## 2023-02-25 RX ADMIN — DOCUSATE SODIUM 100 MG: 100 CAPSULE ORAL at 20:51

## 2023-02-25 RX ADMIN — ONDANSETRON 8 MG: 8 TABLET, ORALLY DISINTEGRATING ORAL at 23:36

## 2023-02-25 RX ADMIN — OXYCODONE HYDROCHLORIDE 10 MG: 5 TABLET ORAL at 18:06

## 2023-02-25 RX ADMIN — IBUPROFEN 800 MG: 800 TABLET, FILM COATED ORAL at 11:36

## 2023-02-25 RX ADMIN — KETOROLAC TROMETHAMINE 30 MG: 30 INJECTION, SOLUTION INTRAMUSCULAR; INTRAVENOUS at 00:22

## 2023-02-25 RX ADMIN — ACETAMINOPHEN 650 MG: 325 TABLET, FILM COATED ORAL at 08:12

## 2023-02-25 RX ADMIN — SIMETHICONE 80 MG: 80 TABLET, CHEWABLE ORAL at 20:51

## 2023-02-25 RX ADMIN — POLYETHYLENE GLYCOL 3350 17 G: 17 POWDER, FOR SOLUTION ORAL at 11:36

## 2023-02-25 RX ADMIN — OXYCODONE HYDROCHLORIDE 10 MG: 5 TABLET ORAL at 10:03

## 2023-02-25 RX ADMIN — OXYCODONE HYDROCHLORIDE 10 MG: 5 TABLET ORAL at 13:59

## 2023-02-25 ASSESSMENT — PAIN - FUNCTIONAL ASSESSMENT
PAIN_FUNCTIONAL_ASSESSMENT: ACTIVITIES ARE NOT PREVENTED
PAIN_FUNCTIONAL_ASSESSMENT: ACTIVITIES ARE NOT PREVENTED

## 2023-02-25 ASSESSMENT — PAIN DESCRIPTION - ORIENTATION
ORIENTATION: LOWER
ORIENTATION: LOWER

## 2023-02-25 ASSESSMENT — PAIN SCALES - GENERAL
PAINLEVEL_OUTOF10: 7
PAINLEVEL_OUTOF10: 9
PAINLEVEL_OUTOF10: 5

## 2023-02-25 ASSESSMENT — PAIN DESCRIPTION - LOCATION
LOCATION: ABDOMEN
LOCATION: ABDOMEN
LOCATION: ABDOMEN;INCISION
LOCATION: ABDOMEN;INCISION

## 2023-02-25 ASSESSMENT — PAIN DESCRIPTION - DESCRIPTORS
DESCRIPTORS: SORE
DESCRIPTORS: ACHING
DESCRIPTORS: SORE

## 2023-02-25 NOTE — ANESTHESIA POST-OP
Anesthesiology  Post-op Note    Post-op day 1 s/p  via spinal with neuraxial opioids for post-op pain management. /65   Pulse 96   Temp 98 °F (36.7 °C) (Oral)   Resp 16   LMP 2022   SpO2 97%   Breastfeeding Unknown   Airway patent, patient appropriately hydrated and appears euvolemic. Patient is Alert and oriented. Pain is well controlled. Pruritus is moderately controlled. Nausea is absent. No complaints about back or site of injection. Motor and sensory function has returned to baseline in lower extremities. Patient is satisfied with anesthetic and reports no complications. Continue current orders, then initiate surgeon's orders for pain management 24 hours after . Follow up per surgeon.

## 2023-02-25 NOTE — ADDENDUM NOTE
Addendum  created 02/24/23 1910 by JASON Sosa CRNA    Intraprocedure Event edited, Intraprocedure Staff edited

## 2023-02-25 NOTE — LACTATION NOTE
This note was copied from a baby's chart. Assisted with attempt at breast in football on R.  Baby appears t be latched, but when pulled back, nipple slides out. Discussed importance of sustained latch with mom. Baby did seem to start to suck with more consistency and mom very reluctant to check to ensure he was actually on well. Suggested mom start pumping if baby not consistently latching well. Pump set up at bedside, but mom not quite ready to start. Discussed normal  behavior. May take baby a little while to figure out how to nurse well and consistently. Plan to continued trying at breast and pumping if no latch. Will follow output and weight loss. Will continue to assist with positioning and technique. Encouraged attempt at breast and follow plan.

## 2023-02-25 NOTE — PROGRESS NOTES
Shift assessment complete see flowsheet. Discussed today plan of care with pt. Bleeding precautions given. Pt encouraged and educated to ambulate in hallway today and to shower. No s/s of distress noted. Pt to call with needs/concerns. Pt in bed with call light in reach.

## 2023-02-25 NOTE — LACTATION NOTE
This note was copied from a baby's chart. Spot check BS was 49, so mom started pumping. Got mom off pump, no drops. Mom wants to put baby to breast and not supplement since threshold is 45. Discussed will need to watch baby carefully. Since not latching on L side and only partially latching on R side, suggest continuing to pump and likely need to supplement. Can do with curved tip syringe.

## 2023-02-25 NOTE — LACTATION NOTE
This note was copied from a baby's chart. This RN attempted to get baby latched on right breast via football hold. Baby gorge not latch, baby would not open mouth or root around. Mother requesting formula at this time. Formula brought to room and mother educated on how to prepare bottles and to discard one hour after opening. Mother declined to syringe feed baby.

## 2023-02-25 NOTE — PROGRESS NOTES
Progress Note                               Patient: Deadra Koyanagi MRN: [id number]  SSN: [id number]    YOB: 1990  Age: 35 y.o. Sex: female      Postpartum Day Number 1 Day Post-Op     Subjective:     Patient doing well without significant complaints. Ambulating, voiding without difficulty Patient reports normal lochia. Pain controlled with oral pain medication - pt adjusting as expected on POD#1    Objective:     Patient Vitals for the past 18 hrs:   Temp Pulse Resp BP SpO2   23 1650 97.5 °F (36.4 °C) 99 16 134/86 97 %   23 1310 97.8 °F (36.6 °C) 90 16 112/70 96 %   23 0730 98 °F (36.7 °C) 96 16 121/65 97 %   23 0258 97.7 °F (36.5 °C) 72 16 123/74 99 %        Temp (24hrs), Av.1 °F (36.7 °C), Min:97.5 °F (36.4 °C), Max:98.8 °F (37.1 °C)      Physical Exam:    GEN: NAD, AAOx3  HEENT: NCAT  CV: Regular rate  PUL: Normal respiratory effort  ABD: ND, approp tender to palpation, FF below umb, incision c/d/i  EXT: nonpitting edema, no CT       Lab/Data Review: All lab results for the last 24 hours reviewed. CBC:    Recent Labs     23  0818 23  0642   WBC 7.0 7.9   HGB 11.0* 9.6*   HCT 31.8* 28.9*   * 129*        Assessment and Plan:     Patient appears to be having an uncomplicated postpartum / post op course. Continue routine perineal care and maternal education. Anticipate DC Monday AM. Encouraged pt to wear abdominal binder if tolerated. Rh neg. Rhogam to be given prior to DC.     Tere Avelar MD

## 2023-02-26 PROCEDURE — 6370000000 HC RX 637 (ALT 250 FOR IP): Performed by: OBSTETRICS & GYNECOLOGY

## 2023-02-26 PROCEDURE — 1100000000 HC RM PRIVATE

## 2023-02-26 RX ORDER — LANOLIN ALCOHOL/MO/W.PET/CERES
400 CREAM (GRAM) TOPICAL DAILY
Status: DISCONTINUED | OUTPATIENT
Start: 2023-02-26 | End: 2023-02-27 | Stop reason: HOSPADM

## 2023-02-26 RX ADMIN — ACETAMINOPHEN 1000 MG: 500 TABLET, FILM COATED ORAL at 08:54

## 2023-02-26 RX ADMIN — ONDANSETRON 8 MG: 8 TABLET, ORALLY DISINTEGRATING ORAL at 14:43

## 2023-02-26 RX ADMIN — Medication 400 MG: at 10:26

## 2023-02-26 RX ADMIN — IBUPROFEN 800 MG: 800 TABLET, FILM COATED ORAL at 05:41

## 2023-02-26 RX ADMIN — ACETAMINOPHEN 1000 MG: 500 TABLET, FILM COATED ORAL at 20:35

## 2023-02-26 RX ADMIN — POLYETHYLENE GLYCOL 3350 17 G: 17 POWDER, FOR SOLUTION ORAL at 08:54

## 2023-02-26 RX ADMIN — FAMOTIDINE 20 MG: 20 TABLET, FILM COATED ORAL at 08:55

## 2023-02-26 RX ADMIN — DOCUSATE SODIUM 100 MG: 100 CAPSULE ORAL at 20:35

## 2023-02-26 RX ADMIN — OXYCODONE HYDROCHLORIDE 10 MG: 5 TABLET ORAL at 10:26

## 2023-02-26 RX ADMIN — ACETAMINOPHEN 1000 MG: 500 TABLET, FILM COATED ORAL at 14:43

## 2023-02-26 RX ADMIN — IBUPROFEN 800 MG: 800 TABLET, FILM COATED ORAL at 18:00

## 2023-02-26 RX ADMIN — IBUPROFEN 800 MG: 800 TABLET, FILM COATED ORAL at 23:25

## 2023-02-26 RX ADMIN — OXYCODONE HYDROCHLORIDE 10 MG: 5 TABLET ORAL at 05:41

## 2023-02-26 RX ADMIN — OXYCODONE HYDROCHLORIDE 10 MG: 5 TABLET ORAL at 23:33

## 2023-02-26 RX ADMIN — IBUPROFEN 800 MG: 800 TABLET, FILM COATED ORAL at 12:23

## 2023-02-26 RX ADMIN — ACETAMINOPHEN 1000 MG: 500 TABLET, FILM COATED ORAL at 03:11

## 2023-02-26 RX ADMIN — DOCUSATE SODIUM 100 MG: 100 CAPSULE ORAL at 08:54

## 2023-02-26 ASSESSMENT — PAIN DESCRIPTION - ORIENTATION
ORIENTATION: LOWER
ORIENTATION: LOWER

## 2023-02-26 ASSESSMENT — PAIN SCALES - GENERAL
PAINLEVEL_OUTOF10: 3
PAINLEVEL_OUTOF10: 7
PAINLEVEL_OUTOF10: 8
PAINLEVEL_OUTOF10: 2
PAINLEVEL_OUTOF10: 4

## 2023-02-26 ASSESSMENT — PAIN - FUNCTIONAL ASSESSMENT
PAIN_FUNCTIONAL_ASSESSMENT: ACTIVITIES ARE NOT PREVENTED

## 2023-02-26 ASSESSMENT — PAIN DESCRIPTION - DESCRIPTORS
DESCRIPTORS: SORE
DESCRIPTORS: BURNING;CRAMPING

## 2023-02-26 ASSESSMENT — PAIN DESCRIPTION - LOCATION
LOCATION: ABDOMEN;INCISION
LOCATION: ABDOMEN;INCISION
LOCATION: ABDOMEN
LOCATION: ABDOMEN;INCISION

## 2023-02-26 NOTE — PROGRESS NOTES
Shift assessment complete see flowsheet. Discussed today plan of care with pt. Bleeding precautions given. Pt not passing flatus. Pt states she took PO Magnesium 1 tab during pregnancy and is requesting to start back on it. Will talk with MD. Pt educated that the oxycodone can cause constipation. Pt encouraged to ambulate in hallway today. Questions encouraged and answered. Pt to call with needs/concerns. Pt in bed with call light in reach.

## 2023-02-26 NOTE — LACTATION NOTE
This note was copied from a baby's chart. Mom reports baby bottle fed at delivery due to low blood sugar. Took another bottle again 1 hour prior to lactation visit. Does want to try at breast.  Reviewed first 24 hour expectations. Discussed feeding expectations in second day. Encouraged to try at breast, offer both sides and alternate starting side. Encouraged skin to skin. Plan to visualize feeding prior to discharge.

## 2023-02-26 NOTE — LACTATION NOTE
This note was copied from a baby's chart. In to see mom and infant for follow up. Mom feeding baby bottle of formula in bed when came in. Baby took around 20 mls already. She had 1 ml of EBM at bedside to give also from prior pump session. Gave her printed copy of feeding plan to help w/ feeding guidance. Reviewed w/ mom and she verbalized understanding how to use. Mom had no questions or needs from lactation today at this time.  Lactation to follow up in am.

## 2023-02-26 NOTE — LACTATION NOTE
This note was copied from a baby's chart. Individualized Feeding Plan for Breastfeeding   Lactation Services [phone]    As much as possible, hold your baby on your chest so babys bare skin is against your bare skin with a blanket covering babys back, especially 30 minutes before it is time for baby to eat. Watch for early feeding cues such as, licking lips, sucking motions, rooting, hands to mouth. Crying is a late feeding cue. Feed your baby at least 8 times in 24 hours, or more if your baby is showing feeding cues. If baby is sleepy put baby skin to skin and watch for hunger cues. To rouse baby: unwrap, undress, massage hands, feet, & back, change diaper, gently change babys position from lying to sitting. 15-20 minutes on the first breast of active breastfeeding is considered a good feeding. Good, active breastfeeding is when baby is alert, tugging the nipple, their ear may move, and you can hear swallows. Allow baby to finish the first side before changing sides. Sleeping at the breast or only brief, light sucks should not be considered a good, full breastfeed. At each feeding:  __x__1. Do Suck Practice on finger before each feeding until sucking pattern is smooth. Try using index finger. Nail down towards tongue. __x__2. Hand Express for a few minutes prior to latching to help start milk flow. __x__3. Baby needs to NURSE WELL x 15-20 minutes on at least first breast, burp and offer 2nd breast at every feeding. If no sustained latch only attempt at breast for 10 minutes. If baby does not latch on and feed well on at least one side, you should:   __x__4. Double pump for 15 minutes with breast massage and compression. Hand express for an additional 2-3 minutes per side. Pump after each feeding attempt or poor feeding, up to 8 times per day. If you are not putting baby to the breast you need to pump 8 times a day. Pump every 3 hours. __x__5.  Give baby all of the breast milk you obtain using a straight syringe or  curved syringe. If baby does NOT have enough wet and dirty diapers per day, is jaundiced/lethargic, or has significant weight loss AND you do NOT pump enough milk for each feeding (per volume listed below), formula supplementation may need to be used. Call lactation department /pediatrician if you have concerns. AVERAGE INTAKES OF COLOSTRUM BY HEALTHY  INFANTS:  Time  Day Intake (ml per feeding)  Based on 8 feedings per day. 1st 24 hrs  1 2-10 ml  24-48 hrs  2 5-15 ml  48-72 hrs  3 15-30 ml (0.5-1 oz)  72-96 hrs  4 30-45 ml (1-1.5oz)                          5-6      45-60 ml (1.5-2oz)                           7         75 ml (2.5oz) + more as desired      By day 7, baby will need 75 ml or 2.5 oz at each feeding based on 8 feedings per day & babys weight. (1oz = 30ml). Total milk volume needed in 24 hours by Day 7 is 19 oz per day based on baby's birthweight of 6lb 15oz. The more often baby eats, the less volume they need per feeding. If baby is eating more often than the minimum of 8 times per day, they may take less per feeding. Comments: Use plan as needed if not latching well. If giving formula after a breastfeed, will also need to pump for 10 minutes. If baby does not latch and you just bottle feed, pump for 15 minutes. If pumping, suggest using olive oil or coconut oil on your nipples before pumping to help reduce the friction. Use feeding plan until follow up with pediatrician. Continue to attempt at the breast for most feeds. Pump every 3 hours if no latch. Give all pumped colostrum/breastmilk at each feeding. OUTPATIENT APPOINTMENT Suggested. Outpatient services are located on the 4th floor at [address] [place] [address]. Check in at the 4th floor registration desk (the same one you used when you came to have your baby).   Call for questions [phone]

## 2023-02-26 NOTE — PROGRESS NOTES
Informed Dr. Damian Tolbert via phone of pt not passing flatus and requesting Mag. Per MD pt can have Mag Ox 400mg 1 tab PO. Orders read back and verified.

## 2023-02-27 VITALS
TEMPERATURE: 97.9 F | DIASTOLIC BLOOD PRESSURE: 93 MMHG | RESPIRATION RATE: 18 BRPM | OXYGEN SATURATION: 97 % | HEART RATE: 97 BPM | SYSTOLIC BLOOD PRESSURE: 138 MMHG

## 2023-02-27 PROCEDURE — 6370000000 HC RX 637 (ALT 250 FOR IP): Performed by: OBSTETRICS & GYNECOLOGY

## 2023-02-27 RX ORDER — OXYCODONE HYDROCHLORIDE 5 MG/1
5 TABLET ORAL EVERY 4 HOURS PRN
Qty: 15 TABLET | Refills: 0 | Status: SHIPPED | OUTPATIENT
Start: 2023-02-27 | End: 2023-03-04

## 2023-02-27 RX ORDER — IBUPROFEN 800 MG/1
800 TABLET ORAL EVERY 8 HOURS PRN
Qty: 60 TABLET | Refills: 0 | Status: SHIPPED | OUTPATIENT
Start: 2023-02-27

## 2023-02-27 RX ADMIN — Medication 400 MG: at 08:18

## 2023-02-27 RX ADMIN — OXYCODONE HYDROCHLORIDE 5 MG: 5 TABLET ORAL at 08:17

## 2023-02-27 RX ADMIN — DOCUSATE SODIUM 100 MG: 100 CAPSULE ORAL at 08:17

## 2023-02-27 RX ADMIN — POLYETHYLENE GLYCOL 3350 17 G: 17 POWDER, FOR SOLUTION ORAL at 08:17

## 2023-02-27 RX ADMIN — IBUPROFEN 800 MG: 800 TABLET, FILM COATED ORAL at 05:42

## 2023-02-27 NOTE — PROGRESS NOTES
Progress Note                               Patient: Brendan Wheeler MRN: [id number]  SSN: [id number]    YOB: 1990  Age: 35 y.o. Sex: female      Postpartum Day Number 2 Days Post-Op     Subjective:     Patient doing well without significant complaints. Ambulating, voiding without difficulty Patient reports normal lochia. Infant: Breastfeeding and/or pumping    Objective:     Patient Vitals for the past 18 hrs:   Temp Pulse Resp BP SpO2   23 1511 98.3 °F (36.8 °C) 71 18 132/88 99 %   23 1145 98.5 °F (36.9 °C) 92 16 133/85 98 %   23 0804 97.9 °F (36.6 °C) 77 18 113/74 98 %   23 0304 98.6 °F (37 °C) 89 16 112/68 97 %        Temp (24hrs), Av.4 °F (36.9 °C), Min:97.9 °F (36.6 °C), Max:98.6 °F (37 °C)      Physical Exam:    Patient without distress. Neuro / Psych grossly WNL, A&O X 3, Heart: regular rate, Lungs: normal respiratory effort, Abdomen: Abdomen appropriately tender and incision C/D/I. Lab/Data Review: All lab results for the last 24 hours reviewed. Assessment and Plan:     Patient appears to be having an uncomplicated postpartum / post op course. Continue routine perineal care and maternal education. Rh negative, will get rhogam prior to DC.     Marquise Walden MD

## 2023-02-27 NOTE — DISCHARGE SUMMARY
Obstetrical Discharge Summary     Name: Deadra Koyanagi MRN: [id number]  SSN: [id number]    YOB: 1990  Age: 35 y.o. Sex: female      Allergies: Latex and Hydromorphone    Admit Date: 2023    Discharge Date: 2023     Admitting Physician: Tere Avelar MD     Attending Physician:  Tere Avelar MD     * Admission Diagnoses: 39 weeks gestation of pregnancy [Z3A.39]    * Discharge Diagnoses: 39 weeks gestation of pregnancy [Z3A.39]              Additional Diagnoses:   Hospital Problems             Last Modified POA    * (Principal) 39 weeks gestation of pregnancy 2023 Yes    Previous  section complicating pregnancy  Yes        All lab results for the last 24 hours reviewed. Immunizations:    Immunization History   Administered Date(s) Administered    Rho (D) Immune Globulin 2017, 2018       Group Beta Strep: No components found for: OBEXTGRBS        Lab Results   Component Value Date/Time    ABORH O NEGATIVE 2023 08:18 AM      Immunization History   Administered Date(s) Administered    Rho (D) Immune Globulin 2017, 2018       * Procedures:   Procedure(s):   SECTION         * Discharge Condition: [facility] Course: Normal hospital course following the delivery. * Disposition: Home    Discharge Medications:      Medication List        START taking these medications      ibuprofen 800 MG tablet  Commonly known as: ADVIL;MOTRIN  Take 1 tablet by mouth every 8 hours as needed for Pain     oxyCODONE 5 MG immediate release tablet  Commonly known as: ROXICODONE  Take 1 tablet by mouth every 4 hours as needed for Pain for up to 5 days.  Max Daily Amount: 30 mg            CONTINUE taking these medications      calcium carbonate 500 MG chewable tablet  Commonly known as: TUMS     ferrous sulfate 325 (65 Fe) MG tablet  Commonly known as: IRON 325  Take 1 tablet by mouth daily (with breakfast)     PRENATAL MULTI +DHA PO            STOP taking these medications      aspirin 81 MG chewable tablet     butalbital-acetaminophen-caffeine -40 MG per tablet  Commonly known as: FIORICET, ESGIC     famotidine 20 MG tablet  Commonly known as: PEPCID     folic acid 558 MCG tablet  Commonly known as:  Folate     Magnesium 400 MG Tabs     vitamin C 500 MG tablet  Commonly known as: ASCORBIC ACID            ASK your doctor about these medications      acetaminophen 500 MG tablet  Commonly known as: TYLENOL     polyethylene glycol 17 GM/SCOOP powder  Commonly known as: GLYCOLAX  Take 17gm once or twice a day as needed               Where to Get Your Medications        These medications were sent to [facility]/pharmacy #[id number]- [place], [place] - [address] [facility]  [facility] [place] [facility] [address], [address]      Phone: [phone]   ibuprofen 800 MG tablet  oxyCODONE 5 MG immediate release tablet         * Follow-up Care/Patient Instructions:    Ariana Dahl MD  [facility] [id number]  [phone]    Follow up in 2 week(s)  Routine Post-op Check       Activity: no lifting, Driving, or Strenuous exercise for at least 2-3 weeks and no sex for at least 6 weeks  Diet: regular diet  Wound Care: keep wound clean and dry

## 2023-02-27 NOTE — PROGRESS NOTES
Progress Note                               Patient: Yusef De La Fuente MRN: [id number]  SSN: [id number]    YOB: 1990  Age: 35 y.o. Sex: female      Postpartum Day Number 3 Days Post-Op     Subjective:     Patient doing well without significant complaints. Ambulating, voiding without difficulty Patient reports normal lochia. . Infant: Breastfeeding and/or pumping    Objective:     Patient Vitals for the past 18 hrs:   Temp Pulse Resp BP SpO2   23 0749 99 °F (37.2 °C) 91 16 111/81 97 %   23 0339 98.8 °F (37.1 °C) 87 16 114/65 96 %   23 2323 98.6 °F (37 °C) 87 16 117/71 97 %   23 2128 98.8 °F (37.1 °C) 78 -- 122/79 96 %        Temp (24hrs), Av.7 °F (37.1 °C), Min:98.3 °F (36.8 °C), Max:99 °F (37.2 °C)      Physical Exam:    Patient without distress. Neuro / Psych grossly WNL, A&O X 3, Abdomen: Abdomen appropriately tender, Fundus Firm, without erythema, without hematoma, and without evidence of infection    Lab/Data Review: All lab results for the last 24 hours reviewed. Blood Type:   Lab Results   Component Value Date/Time    ABORH O NEGATIVE 2023 08:18 AM      Fetal Screen: No results found for: [facility]: No results found for: EKLB     Assessment and Plan:     Patient appears to be having an uncomplicated postpartum course. Continue routine perineal care and maternal education. , Will discharge home today. , received rhogam yesterday    Alma Mayen MD

## 2023-02-27 NOTE — DISCHARGE INSTRUCTIONS
Section: What to Expect at [address] [place] [address]     A  section, or , is surgery to deliver your baby through a cut that the doctor makes in your lower belly and uterus. The cut is called an incision. You may have some pain in your lower belly and need pain medicine for 1 to 2 weeks. You can expect some vaginal bleeding for several weeks. You will probably need about 6 weeks to fully recover. It's important to take it easy while the incision heals. Avoid heavy lifting, strenuous activities, and exercises that strain the belly muscles while you recover. Ask a family member or friend for help with housework, cooking, and shopping. This care sheet gives you a general idea about how long it will take for you to recover. But each person recovers at a different pace. Follow the steps below to get better as quickly as possible. How can you care for yourself at home? Activity    Rest when you feel tired. Getting enough sleep will help you recover. Try to walk each day. Start by walking a little more than you did the day before. Bit by bit, increase the amount you walk. Walking boosts blood flow and helps prevent pneumonia, constipation, and blood clots. Avoid strenuous activities, such as bicycle riding, jogging, weightlifting, and aerobic exercise, for 6 weeks or until your doctor says it is okay. Until your doctor says it is okay, do not lift anything heavier than your baby. Do not do sit-ups or other exercises that strain the belly muscles for 6 weeks or until your doctor says it is okay. Hold a pillow over your incision when you cough or take deep breaths. This will support your belly and decrease your pain. You may shower as usual. Pat the incision dry when you are done. You will have some vaginal bleeding. Wear sanitary pads. Do not douche or use tampons until your doctor says it is okay. Ask your doctor when you can drive again.      You will probably need to take at least 6 weeks off work. It depends on the type of work you do and how you feel. Ask your doctor when it is okay for you to have sex. Diet    You can eat your normal diet. If your stomach is upset, try bland, low-fat foods like plain rice, broiled chicken, toast, and yogurt. Drink plenty of fluids (unless your doctor tells you not to). You may notice that your bowel movements are not regular right after your surgery. This is common. Try to avoid constipation and straining with bowel movements. You may want to take a fiber supplement every day. If you have not had a bowel movement after a couple of days, ask your doctor about taking a mild laxative. If you are breastfeeding, limit alcohol. Alcohol can cause a lack of energy and other health problems for the baby when a breastfeeding woman drinks heavily. It can also get in the way of a mom's ability to feed her baby or to care for the child in other ways. There isn't a lot of research about exactly how much alcohol can harm a baby. Having no alcohol is the safest choice for your baby. If you choose to have a drink now and then, have only one drink, and limit the number of occasions that you have a drink. Wait to breastfeed at least 2 hours after you have a drink to reduce the amount of alcohol the baby may get in the milk. Medicines    Your doctor will tell you if and when you can restart your medicines. You will also get instructions about taking any new medicines. If you stopped taking aspirin or some other blood thinner, your doctor will tell you when to start taking it again. Take pain medicines exactly as directed. If the doctor gave you a prescription medicine for pain, take it as prescribed. If you are not taking a prescription pain medicine, ask your doctor if you can take an over-the-counter medicine. If you think your pain medicine is making you sick to your stomach:   Take your medicine after meals (unless your doctor has told you not to). Ask your doctor for a different pain medicine. If your doctor prescribed antibiotics, take them as directed. Do not stop taking them just because you feel better. You need to take the full course of antibiotics. Incision care    If you have strips of tape on the incision, leave the tape on for a week or until it falls off. Wash the area daily with warm, soapy water, and pat it dry. Don't use hydrogen peroxide or alcohol, which can slow healing. You may cover the area with a gauze bandage if it weeps or rubs against clothing. Change the bandage every day. Keep the area clean and dry. Other instructions    If you breastfeed your baby, you may be more comfortable while you are healing if you don't rest your baby on your belly. Try tucking your baby under your arm, with your baby's body along the side you will be feeding on. Support your baby's upper body with your arm. With that hand you can control your baby's head to bring your baby's mouth to your breast. This is sometimes called the football hold. Follow-up care is a key part of your treatment and safety. Be sure to make and go to all appointments, and call your doctor if you are having problems. It's also a good idea to know your test results and keep a list of the medicines you take. When should you call for help? Share this information with your partner, family, or a friend. They can help you watch for warning signs. Call 911  anytime you think you may need emergency care. For example, call if:    You have thoughts of harming yourself, your baby, or another person. You passed out (lost consciousness). You have chest pain, are short of breath, or cough up blood. You have a seizure. Call your doctor now or seek immediate medical care if:    You have loose stitches, or your incision comes open. You have signs of hemorrhage (too much bleeding), such as:  Heavy vaginal bleeding.  This means that you are soaking through one or more pads in an hour. Or you pass blood clots bigger than an egg. Feeling dizzy or lightheaded, or you feel like you may faint. Feeling so tired or weak that you cannot do your usual activities. A fast or irregular heartbeat. New or worse belly pain. You have symptoms of infection, such as: Increased pain, swelling, warmth, or redness. Red streaks leading from the incision. Pus draining from the incision. A fever. Vaginal discharge that smells bad. New or worse belly pain. You have symptoms of a blood clot in your leg (called a deep vein thrombosis), such as:  Pain in your calf, back of the knee, thigh, or groin. Redness and swelling in your leg or groin. You have signs of preeclampsia, such as:  Sudden swelling of your face, hands, or feet. New vision problems (such as dimness, blurring, or seeing spots). A severe headache. Watch closely for changes in your health, and be sure to contact your doctor if:    Your vaginal bleeding isn't decreasing. You feel sad, anxious, or hopeless for more than a few days. You are having problems with your breasts or breastfeeding. Where can you learn more? Go to http://www.woods.com/ and enter M806 to learn more about \" Section: What to Expect at Home. \"  Current as of: 2022               Content Version: 13.5   [employer]. Care instructions adapted under license by [facility] ([facility]). If you have questions about a medical condition or this instruction, always ask your healthcare professional. [place] [address] any warranty or liability for your use of this information.

## 2023-02-27 NOTE — CARE COORDINATION
Chart reviewed - no needs identified. Mother/infant are discharging home today with infants Father and brother age 3. SW met with patient and infant to complete initial assessment. Patient has a PCP identified for follow up care. Confirmed pt demographics for follow up if needed. Patient given informational packet on  mood & anxiety disorders (resources/education). [place]  depression scale score of 2. Patient denies any history of postpartum depression/anxiety. Patient shared that she lost a baby in 2018 that lived for only 24 days. Per patient, she experienced \"normal grief\" with depression/anxiety after loss. Patient states that she has a strong extended support system consisting of her mother and other family. Pt declined referral for a [facility] postpartum  home visit at this time. [address] [place] [address] brochure provided and pt is aware she can call and request a visit if she chooses to do so. Mother denies any additional needs from  at this time. Family has 's contact information should any needs/questions arise.

## 2023-02-27 NOTE — LACTATION NOTE
This note was copied from a baby's chart. Lactation visit. Doing well. Mom mostly pumping and bottle feeding formula plus expressed breastmilk. Baby is latching at some feeds, but sleepy at others. Reviewed supply and demand. Pump if baby latching poorly, mom recently pumped 30ml total. Volume increasing well today. Supplement ad anayeli for full feeds. Weight up 2oz last night and good output. Mom has pump for home use. Supplies given as requested. Reviewed safe breastmilk storage guidelines. Questions answered.

## 2023-03-08 ENCOUNTER — TELEPHONE (OUTPATIENT)
Dept: OBGYN CLINIC | Age: 33
End: 2023-03-08

## 2023-03-08 ENCOUNTER — HOSPITAL ENCOUNTER (OUTPATIENT)
Age: 33
Discharge: HOME | End: 2023-03-08
Attending: OBSTETRICS & GYNECOLOGY | Admitting: OBSTETRICS & GYNECOLOGY
Payer: COMMERCIAL

## 2023-03-08 VITALS — DIASTOLIC BLOOD PRESSURE: 99 MMHG | SYSTOLIC BLOOD PRESSURE: 144 MMHG | HEART RATE: 64 BPM

## 2023-03-08 PROBLEM — R51.9 HEADACHE: Status: ACTIVE | Noted: 2023-03-08

## 2023-03-08 LAB
ALBUMIN SERPL-MCNC: 3.1 G/DL (ref 3.5–5)
ALBUMIN/GLOB SERPL: 0.8 (ref 0.4–1.6)
ALP SERPL-CCNC: 87 U/L (ref 50–130)
ALT SERPL-CCNC: 18 U/L (ref 12–65)
ANION GAP SERPL CALC-SCNC: 3 MMOL/L (ref 2–11)
AST SERPL-CCNC: 17 U/L (ref 15–37)
BASOPHILS # BLD: 0 K/UL (ref 0–0.2)
BASOPHILS NFR BLD: 0 % (ref 0–2)
BILIRUB SERPL-MCNC: 0.4 MG/DL (ref 0.2–1.1)
BUN SERPL-MCNC: 9 MG/DL (ref 6–23)
CALCIUM SERPL-MCNC: 9.1 MG/DL (ref 8.3–10.4)
CHLORIDE SERPL-SCNC: 110 MMOL/L (ref 101–110)
CO2 SERPL-SCNC: 29 MMOL/L (ref 21–32)
CREAT SERPL-MCNC: 0.8 MG/DL (ref 0.6–1)
CREAT UR-MCNC: 125 MG/DL
DIFFERENTIAL METHOD BLD: ABNORMAL
EOSINOPHIL # BLD: 0.1 K/UL (ref 0–0.8)
EOSINOPHIL NFR BLD: 2 % (ref 0.5–7.8)
ERYTHROCYTE [DISTWIDTH] IN BLOOD BY AUTOMATED COUNT: 13.1 % (ref 11.9–14.6)
GLOBULIN SER CALC-MCNC: 3.8 G/DL (ref 2.8–4.5)
GLUCOSE SERPL-MCNC: 75 MG/DL (ref 65–100)
HCT VFR BLD AUTO: 34.2 % (ref 35.8–46.3)
HGB BLD-MCNC: 11.1 G/DL (ref 11.7–15.4)
IMM GRANULOCYTES # BLD AUTO: 0 K/UL (ref 0–0.5)
IMM GRANULOCYTES NFR BLD AUTO: 0 % (ref 0–5)
LYMPHOCYTES # BLD: 1.3 K/UL (ref 0.5–4.6)
LYMPHOCYTES NFR BLD: 26 % (ref 13–44)
MCH RBC QN AUTO: 28.1 PG (ref 26.1–32.9)
MCHC RBC AUTO-ENTMCNC: 32.5 G/DL (ref 31.4–35)
MCV RBC AUTO: 86.6 FL (ref 82–102)
MONOCYTES # BLD: 0.3 K/UL (ref 0.1–1.3)
MONOCYTES NFR BLD: 5 % (ref 4–12)
NEUTS SEG # BLD: 3.5 K/UL (ref 1.7–8.2)
NEUTS SEG NFR BLD: 67 % (ref 43–78)
NRBC # BLD: 0 K/UL (ref 0–0.2)
PLATELET # BLD AUTO: 294 K/UL (ref 150–450)
PMV BLD AUTO: 9.2 FL (ref 9.4–12.3)
POTASSIUM SERPL-SCNC: 4 MMOL/L (ref 3.5–5.1)
PROT SERPL-MCNC: 6.9 G/DL (ref 6.3–8.2)
PROT UR-MCNC: 15 MG/DL
PROT/CREAT UR-RTO: 0.1
RBC # BLD AUTO: 3.95 M/UL (ref 4.05–5.2)
SODIUM SERPL-SCNC: 142 MMOL/L (ref 133–143)
URATE SERPL-MCNC: 5.1 MG/DL (ref 2.6–6)
WBC # BLD AUTO: 5.2 K/UL (ref 4.3–11.1)

## 2023-03-08 PROCEDURE — 99283 EMERGENCY DEPT VISIT LOW MDM: CPT

## 2023-03-08 PROCEDURE — 6370000000 HC RX 637 (ALT 250 FOR IP): Performed by: OBSTETRICS & GYNECOLOGY

## 2023-03-08 PROCEDURE — 84156 ASSAY OF PROTEIN URINE: CPT

## 2023-03-08 PROCEDURE — 85025 COMPLETE CBC W/AUTO DIFF WBC: CPT

## 2023-03-08 PROCEDURE — 84550 ASSAY OF BLOOD/URIC ACID: CPT

## 2023-03-08 PROCEDURE — 80053 COMPREHEN METABOLIC PANEL: CPT

## 2023-03-08 RX ORDER — ONDANSETRON 4 MG/1
4 TABLET, ORALLY DISINTEGRATING ORAL EVERY 8 HOURS PRN
Status: DISCONTINUED | OUTPATIENT
Start: 2023-03-08 | End: 2023-03-08 | Stop reason: HOSPADM

## 2023-03-08 RX ORDER — BUTALBITAL, ACETAMINOPHEN AND CAFFEINE 50; 325; 40 MG/1; MG/1; MG/1
1 TABLET ORAL EVERY 4 HOURS PRN
Status: DISCONTINUED | OUTPATIENT
Start: 2023-03-08 | End: 2023-03-08 | Stop reason: HOSPADM

## 2023-03-08 RX ORDER — ACETAMINOPHEN 500 MG
1000 TABLET ORAL EVERY 6 HOURS PRN
Status: DISCONTINUED | OUTPATIENT
Start: 2023-03-08 | End: 2023-03-08

## 2023-03-08 RX ORDER — NIFEDIPINE 30 MG/1
30 TABLET, EXTENDED RELEASE ORAL DAILY
Qty: 30 TABLET | Refills: 0 | Status: SHIPPED | OUTPATIENT
Start: 2023-03-08

## 2023-03-08 RX ORDER — ONDANSETRON 2 MG/ML
4 INJECTION INTRAMUSCULAR; INTRAVENOUS EVERY 6 HOURS PRN
Status: DISCONTINUED | OUTPATIENT
Start: 2023-03-08 | End: 2023-03-08 | Stop reason: HOSPADM

## 2023-03-08 RX ADMIN — BUTALBITAL, ACETAMINOPHEN, AND CAFFEINE 1 TABLET: 325; 50; 40 TABLET ORAL at 12:09

## 2023-03-08 NOTE — DISCHARGE INSTRUCTIONS
Learning About Preeclampsia After Childbirth  What is preeclampsia? Preeclampsia means that your blood pressure during pregnancy is higher than usual. You may also have other serious symptoms. Preeclampsia can be dangerous. When it is severe, it can cause seizures (eclampsia) or liver or kidney damage. When it affects the liver, it can cause HELLP syndrome, a blood-clotting and bleeding problem. HELLP can come on quickly and can be deadly. This is why your doctor checks you and your baby often. Preeclampsia usually occurs after 20 weeks of pregnancy. Most often, it starts near the end of pregnancy and goes away after childbirth. But symptoms may last a few weeks or more and can get worse after delivery. Rarely, symptoms of preeclampsia don't show up until days or even weeks after childbirth. What are the symptoms? Mild preeclampsia usually doesn't cause symptoms. But preeclampsia can cause rapid weight gain and sudden swelling of the hands and face. Severe preeclampsia does cause symptoms. It can cause a very bad headache and trouble seeing and breathing. It also can cause belly pain. You may also urinate less than usual.  What can you expect after you have had preeclampsia? In the hospital  After the baby and the placenta are delivered, preeclampsia usually starts to improve. Most women get better in the first few days after childbirth. After having preeclampsia, you still have a risk of seizures for a day or more after childbirth. (Very rarely, seizures happen later on.) So your doctor may have you take magnesium sulfate for a day or more to prevent seizures. You may also take medicine to lower your blood pressure. When you go home  Your blood pressure will most likely return to normal a few days after delivery. Your doctor will want to check your blood pressure sometime in the first week after you leave the hospital.  Some women still have high blood pressure 6 weeks after childbirth.  But most return to normal levels over the long term. Take and record your blood pressure at home if your doctor tells you to. Ask your doctor to check your blood pressure monitor to be sure that it is accurate and that the cuff fits you. Also ask your doctor to watch you use it, to make sure that you are using it right. You should not eat, use tobacco products, or use medicine known to raise blood pressure (such as some nasal decongestant sprays) before you take your blood pressure. Avoid taking your blood pressure if you have just exercised. Also avoid taking it if you are nervous or upset. Rest at least 15 minutes before you take your blood pressure. Be safe with medicines. If you take medicine, take it exactly as prescribed. Call your doctor if you think you are having a problem with your medicine. Do not smoke. Quitting smoking will help improve your baby's growth and health. If you need help quitting, talk to your doctor about stop-smoking programs and medicines. These can increase your chances of quitting for good. Eat a balanced and healthy diet that has lots of fruits and vegetables. Long-term health  After you have had preeclampsia, you have a higher-than-average risk of heart disease, stroke, and kidney disease. This may be because the same things that cause preeclampsia also cause heart and kidney disease. To protect your health, work with your doctor on living a heart-healthy lifestyle and getting the checkups you need. Your doctor may also want you to check your blood pressure at home. Follow-up care is a key part of your treatment and safety. Be sure to make and go to all appointments, and call your doctor if you are having problems. It's also a good idea to know your test results and keep a list of the medicines you take. When should you call for help? Share this information with your partner or a friend. They can help you watch for warning signs.   Call 911  anytime you think you may need emergency care. For example, call if:    You passed out (lost consciousness). You have a seizure. Call your doctor now or seek immediate medical care if:    You have symptoms of preeclampsia, such as:  Sudden swelling of your face, hands, or feet. New vision problems (such as dimness, blurring, or seeing spots). A severe headache. Your blood pressure is very high, such as 160/110 or higher. Your blood pressure is higher than your doctor told you it should be, or it rises quickly. You have new nausea or vomiting. You have pain in your belly or pelvis. Watch closely for changes in your health, and be sure to contact your doctor if:    You gain weight rapidly. Where can you learn more? Go to http://www.woods.com/ and enter Q718 to learn more about \"Learning About Preeclampsia After Childbirth. \"  Current as of: 2022               Content Version: 13.5   [employer] Care instructions adapted under license by [facility] ([facility]). If you have questions about a medical condition or this instruction, always ask your healthcare professional. [place] [address] any warranty or liability for your use of this information. After Your Delivery (the Postpartum Period): Care Instructions  Overview     Congratulations on the birth of your baby. Like pregnancy, the  period can be a time of excitement, bunny, and exhaustion. You may look at your wondrous little baby and feel happy. You may also be overwhelmed by your new sleep hours and new responsibilities. At first, babies often sleep during the days and are awake at night. They do not have a pattern or routine. They may make sudden gasps, jerk themselves awake, or look like they have crossed eyes. These are all normal, and they may even make you smile. In these first weeks after delivery, try to take good care of yourself.  It may take 4 to 6 weeks to feel like yourself again, and possibly longer if you had a  birth. You will likely feel very tired for several weeks. Your days will be full of ups and downs, but lots of bunny as well. Follow-up care is a key part of your treatment and safety. Be sure to make and go to all appointments, and call your doctor if you are having problems. It's also a good idea to know your test results and keep a list of the medicines you take. How can you care for yourself at home? Take care of your body after delivery  Use pads instead of tampons for the bloody flow that may last as long as 2 weeks. Ease cramps with ibuprofen (Advil, Motrin). Ease soreness of hemorrhoids and the area between your vagina and rectum with ice compresses or witch hazel pads. Ease constipation by drinking lots of fluid and eating high-fiber foods. Ask your doctor about over-the-counter stool softeners. Cleanse yourself with a gentle squeeze of warm water from a bottle instead of wiping with toilet paper. Take a sitz bath in warm water several times a day. Wear a good nursing bra. Ease sore and swollen breasts with warm, wet washcloths. If you aren't breastfeeding, use ice rather than heat for breast soreness. Your period may not start for several months if you are breastfeeding. You may bleed more, and longer at first, than you did before you got pregnant. Wait until you are healed (about 4 to 6 weeks) before you have sex. Ask your doctor when it is okay for you to have sex. Try not to travel with your baby for 5 or 6 weeks. If you take a long car trip, make frequent stops to walk around and stretch. Avoid exhaustion  Rest every day. Try to nap when your baby naps. Ask another adult to be with you for a few days after delivery. Plan for  if you have other children. Stay flexible so you can eat at odd hours and sleep when you need to. Both you and your baby are making new schedules. Plan small trips to get out of the house.  Change can make you feel less tired. Ask for help with housework, cooking, and shopping. Remind yourself that your job is to care for your baby. Know about help for postpartum depression  \"Baby blues\" are common for the first 1 to 2 weeks after birth. You may cry or feel sad or irritable for no reason. Rest whenever you can. Being tired makes it harder to handle your emotions. Go for walks with your baby. Talk to your partner, friends, and family about your feelings. If your symptoms last for more than a few weeks, or if you feel very depressed, ask your doctor for help. Postpartum depression can be treated. Support groups and counseling can help. Sometimes medicine can also help. Stay healthy  Eat healthy foods so you have more energy. If you breastfeed, avoid drugs. If you quit smoking during pregnancy, try to stay smoke-free. If you choose to have a drink now and then, have only one drink, and limit the number of occasions that you have a drink. Wait to breastfeed at least 2 hours after you have a drink to reduce the amount of alcohol the baby may get in the milk. Start daily exercise after 4 to 6 weeks, but rest when you feel tired. Learn exercises to tone your belly. Try Kegel exercises to regain strength in your pelvic muscles. You can do these exercises while you stand or sit. (If doing these exercises causes pain, stop doing them and talk with your doctor.)  Squeeze your muscles as if you were trying not to pass gas. Or squeeze your muscles as if you were stopping the flow of urine. Your belly, legs, and buttocks shouldn't move. Hold the squeeze for 3 seconds, then relax for 5 to 10 seconds. Start with 3 seconds, then add 1 second each week until you are able to squeeze for 10 seconds. Repeat the exercise 10 times a session. Do 3 to 8 sessions a day. Find a class for you and your baby that has an exercise time. If you had a  birth, give yourself a bit more time before you exercise, and be careful.   When should you call for help? Share this information with your partner, family, or a friend. They can help you watch for warning signs. Call 911  anytime you think you may need emergency care. For example, call if:    You have thoughts of harming yourself, your baby, or another person. You passed out (lost consciousness). You have chest pain, are short of breath, or cough up blood. You have a seizure. Call your doctor now or seek immediate medical care if:    You have signs of hemorrhage (too much bleeding), such as:  Heavy vaginal bleeding. This means that you are soaking through one or more pads in an hour. Or you pass blood clots bigger than an egg. Feeling dizzy or lightheaded, or you feel like you may faint. Feeling so tired or weak that you cannot do your usual activities. A fast or irregular heartbeat. New or worse belly pain. You have signs of infection, such as:  A fever. Vaginal discharge that smells bad. New or worse belly pain. You have symptoms of a blood clot in your leg (called a deep vein thrombosis), such as:  Pain in the calf, back of the knee, thigh, or groin. Redness and swelling in your leg or groin. You have signs of preeclampsia, such as:  Sudden swelling of your face, hands, or feet. New vision problems (such as dimness, blurring, or seeing spots). A severe headache. Watch closely for changes in your health, and be sure to contact your doctor if:    Your vaginal bleeding isn't decreasing. You feel sad, anxious, or hopeless for more than a few days. You are having problems with your breasts or breastfeeding. Where can you learn more? Go to http://www.woods.com/ and enter A461 to learn more about \"After Your Delivery (the Postpartum Period): Care Instructions. \"  Current as of: February 23, 2022               Content Version: 13.5  © 8126-0170 [employer]. Care instructions adapted under license by [facility] ([facility]).  If you have questions about a medical condition or this instruction, always ask your healthcare professional. [place] [address] any warranty or liability for your use of this information.

## 2023-03-08 NOTE — PROGRESS NOTES
Dr. Devries Dash notified of BP's. No new orders received.
Patient discharged home per MD order. Discharge instructions completed and patient verbalized understanding. Questions encouraged. Accompanied by family. Stable at discharge.
Pt to room [facility] for triage with chief complaint of high BP. PP X 12 days. Dr. Jair Patiño to see.
(0) independent

## 2023-03-08 NOTE — H&P
History & Physical    Name: Danae Mccabe MRN: [id number]  SSN: [id number]    YOB: 1990  Age: 35 y.o. Sex: female      Subjective:     Reason for Triage visit:  14 days post partum, headache    History of Present Illness: Ms. Geri Weber is a 35 y.o.  female with an a  14 days ago. Patient states that she has known chronic hypertension hx but has been fairly well controlled with diet exercise. On no meds. Recently noted headache and occasional spots in vision last few days. Patient denies abdominal pain  , chest pain, fever, nausea and vomiting, pelvic pressure, right upper quadrant pain  , and shortness of breath. OB History    Para Term  AB Living   3 3 3 0 0 2   SAB IAB Ectopic Molar Multiple Live Births           0 3      # Outcome Date GA Lbr Raul/2nd Weight Sex Delivery Anes PTL Lv   3 Term 23 39w1d  6 lb 14.8 oz (3.14 kg) M CS-LTranv Spinal N ARMEN   2 Term 18 39w0d  7 lb 1.1 oz (3.205 kg) M CS-LTranv  N ARMEN   1 Term 18 38w3d  8 lb 2.9 oz (3.71 kg) M CS-LTranv  N DEC      Birth Comments: lived 24 days. multiple fetal anomalies \"brain wasn't fully developed-hydrocephaly\"     Past Medical History:   Diagnosis Date    Abnormal Papanicolaou smear of cervix     Adenomyosis 2022    Added automatically from request for surgery [id number]    Anemia     oral Fe    Asthma     as a child    COVID-19 vaccine series not completed 2022    Has had COVID 19 infxn -- 2021 Has NOT been COVID 19 vaccinated --counseled and medical websites given for reference    Decreased fetal movement affecting management of pregnancy in third trimester, single or unspecified fetus 2023    Depression     Endometriosis 2017: dx lsc w/ [place] POSTOPERATIVE DIAGNOSES  1. Stage IV endometriosis with involvement of the bladder flap  and adhesions to the anterior lower fundus.   2. Adhesions of the colon to the posterior lower uterine segment. 3. Bilateral adhesions of the ovaries to the pelvic side walls. 4. Endometrioma of the left ovary. Frequent sinus infections     Heart palpitations     Hypertension     hx of; controlled with diet; no meds    Intramural leiomyoma of uterus 2017    LGSIL Pap smear of vagina     Migraine without aura, not intractable 2017    Morbid obesity ([facility] [address]     BMI-35    Tachycardia 10/17/2022     Past Surgical History:   Procedure Laterality Date     SECTION  02/2018 x2    elecitve c/s --multiple fetal anomalies, bpd estimated 10.5cm.  SECTION N/A 2023     SECTION performed by Ian Dinero MD at [address]      GYN  2017    laparoscopy with CO2 laser of endometriosis, lysis of adhesions, ovarian cystectomy and chromotubastion     WISDOM TOOTH EXTRACTION       Social History     Occupational History    Not on file   Tobacco Use    Smoking status: Never    Smokeless tobacco: Never   Vaping Use    Vaping Use: Never used   Substance and Sexual Activity    Alcohol use: No     Alcohol/week: 1.0 standard drink    Drug use: No    Sexual activity: Yes     Partners: Male      Family History   Problem Relation Age of Onset    Colon Cancer Neg Hx     Depression Other     Diabetes Maternal Grandmother     Hypertension Maternal Grandmother     Diabetes Paternal Grandmother     Breast Cancer Neg Hx     No Known Problems Father     No Known Problems Mother     Hypertension Paternal Grandmother     Ovarian Cancer Neg Hx        Allergies   Allergen Reactions    Latex Rash    Hydromorphone Itching     Dilaudid     Prior to Admission medications    Medication Sig Start Date End Date Taking?  Authorizing Provider   ibuprofen (ADVIL;MOTRIN) 800 MG tablet Take 1 tablet by mouth every 8 hours as needed for Pain 23   Albino Douglass MD   calcium carbonate (TUMS) 500 MG chewable tablet Take 1 tablet by mouth daily    Historical Provider, MD ferrous sulfate (IRON 325) 325 (65 Fe) MG tablet Take 1 tablet by mouth daily (with breakfast) 12/12/22   Jared Olivares MD   acetaminophen (TYLENOL) 500 MG tablet Take 500 mg by mouth every 6 hours as needed for Pain  Patient not taking: No sig reported    Historical Provider, MD   polyethylene glycol (GLYCOLAX) 17 GM/SCOOP powder Take 17gm once or twice a day as needed  Patient not taking: Reported on 2/24/2023 8/29/22   Tino Wise MD   Prenatal Vit-Fe Fum-FA-Omega (PRENATAL MULTI +DHA PO) Take by mouth    Historical Provider, MD        Review of Systems:  Complete review of systems performed. Those not specifically mentioned in the HPI are either negative are non related to this patient encounter. Objective:     Vitals:    Vitals:    03/08/23 1117 03/08/23 1120 03/08/23 1136   BP: (!) 149/102 (!) 143/97 (!) 149/92   Pulse: 68 68 62      No data recorded. BP  Min: 143/97  Max: 149/92       Physical Exam:  Heart: RRR  Lungs: cta bl  Adb: soft, nt nd, gravid  Ext: trace edema noted  Neuro: CN intact grossly. Exam benign, normal strength/ reflexes    Lab/Data Review:  No results found for this or any previous visit (from the past 24 hour(s)). Assessment and Plan:   14 days post partum doing well from OB standpoint. Headaches/ BP elevated.   Will rule out for Pre E and then DC home on BP med if HA resolves

## 2023-03-08 NOTE — TELEPHONE ENCOUNTER
Pt called with c/o leg/feet/hand swelling and high blood pressure. Pt is s/p C/S on 2/24/23. Pt has dx of pre-existing essential hypertension, not on BP medication. Pt reports BP this morning was 174/110. Pt states she has had a headache for the past few days with vision changes (floaters). Instructed pt to go to [facility] asap. Pt agree's and voiced understanding.

## 2023-03-13 ENCOUNTER — POSTPARTUM VISIT (OUTPATIENT)
Dept: OBGYN CLINIC | Age: 33
End: 2023-03-13

## 2023-03-13 VITALS
HEIGHT: 64 IN | WEIGHT: 204 LBS | SYSTOLIC BLOOD PRESSURE: 140 MMHG | DIASTOLIC BLOOD PRESSURE: 100 MMHG | BODY MASS INDEX: 34.83 KG/M2

## 2023-03-13 DIAGNOSIS — Z86.2 HISTORY OF ANEMIA: ICD-10-CM

## 2023-03-13 DIAGNOSIS — O10.013 PRE-EXISTING ESSENTIAL HYPERTENSION AFFECTING PREGNANCY IN THIRD TRIMESTER: ICD-10-CM

## 2023-03-13 LAB
BASOPHILS # BLD: 0 K/UL (ref 0–0.2)
BASOPHILS NFR BLD: 0 % (ref 0–2)
DIFFERENTIAL METHOD BLD: NORMAL
EOSINOPHIL # BLD: 0.1 K/UL (ref 0–0.8)
EOSINOPHIL NFR BLD: 2 % (ref 0.5–7.8)
ERYTHROCYTE [DISTWIDTH] IN BLOOD BY AUTOMATED COUNT: 13.1 % (ref 11.9–14.6)
HCT VFR BLD AUTO: 38.5 % (ref 35.8–46.3)
HGB BLD-MCNC: 12.6 G/DL (ref 11.7–15.4)
IMM GRANULOCYTES # BLD AUTO: 0 K/UL (ref 0–0.5)
IMM GRANULOCYTES NFR BLD AUTO: 0 % (ref 0–5)
LYMPHOCYTES # BLD: 1.7 K/UL (ref 0.5–4.6)
LYMPHOCYTES NFR BLD: 33 % (ref 13–44)
MCH RBC QN AUTO: 29.1 PG (ref 26.1–32.9)
MCHC RBC AUTO-ENTMCNC: 32.7 G/DL (ref 31.4–35)
MCV RBC AUTO: 88.9 FL (ref 82–102)
MONOCYTES # BLD: 0.3 K/UL (ref 0.1–1.3)
MONOCYTES NFR BLD: 6 % (ref 4–12)
NEUTS SEG # BLD: 3 K/UL (ref 1.7–8.2)
NEUTS SEG NFR BLD: 59 % (ref 43–78)
NRBC # BLD: 0 K/UL (ref 0–0.2)
PLATELET # BLD AUTO: 311 K/UL (ref 150–450)
PMV BLD AUTO: 10 FL (ref 9.4–12.3)
RBC # BLD AUTO: 4.33 M/UL (ref 4.05–5.2)
WBC # BLD AUTO: 5.1 K/UL (ref 4.3–11.1)

## 2023-03-13 PROCEDURE — 99902 PR PRENATAL VISIT: CPT | Performed by: OBSTETRICS & GYNECOLOGY

## 2023-03-22 NOTE — PROGRESS NOTES
endometriosis, lysis of adhesions, ovarian cystectomy and chromotubastion     WISDOM TOOTH EXTRACTION       Social History     Socioeconomic History    Marital status: Single     Spouse name: Not on file    Number of children: Not on file    Years of education: Not on file    Highest education level: Not on file   Occupational History    Not on file   Tobacco Use    Smoking status: Never    Smokeless tobacco: Never   Vaping Use    Vaping Use: Never used   Substance and Sexual Activity    Alcohol use: No     Alcohol/week: 1.0 standard drink    Drug use: No    Sexual activity: Yes     Partners: Male   Other Topics Concern    Not on file   Social History Narrative    Denies physical or sexual abuse     Social Determinants of Health     Financial Resource Strain: Not on file   Food Insecurity: Not on file   Transportation Needs: Not on file   Physical Activity: Not on file   Stress: Not on file   Social Connections: Not on file   Intimate Partner Violence: Not on file   Housing Stability: Not on file         BP (!) 140/100   Ht 5' 4\" (1.626 m)   Wt 204 lb (92.5 kg)   LMP 05/25/2022   Breastfeeding Yes   BMI 35.02 kg/m²      Review of Systems      Physical Exam  Vitals reviewed. Constitutional:       General: She is not in acute distress. Appearance: Normal appearance. She is well-developed and normal weight. She is not ill-appearing, toxic-appearing or diaphoretic. HENT:      Head: Normocephalic and atraumatic. Eyes:      General: No scleral icterus. Conjunctiva/sclera: Conjunctivae normal.      Pupils: Pupils are equal, round, and reactive to light. Cardiovascular:      Rate and Rhythm: Normal rate and regular rhythm. Heart sounds: Normal heart sounds. No murmur heard. No friction rub. No gallop. Pulmonary:      Effort: Pulmonary effort is normal. No respiratory distress. Breath sounds: Normal breath sounds. No stridor. No wheezing, rhonchi or rales.    Chest:      Chest wall: No

## 2023-05-02 RX ORDER — METRONIDAZOLE 500 MG/1
500 TABLET ORAL 2 TIMES DAILY
Qty: 14 TABLET | Refills: 0 | Status: SHIPPED | OUTPATIENT
Start: 2023-05-02 | End: 2023-05-09

## 2023-07-31 RX ORDER — NIFEDIPINE 30 MG/1
TABLET, EXTENDED RELEASE ORAL
Qty: 30 TABLET | Refills: 3 | OUTPATIENT
Start: 2023-07-31

## 2023-09-16 ENCOUNTER — APPOINTMENT (OUTPATIENT)
Dept: CT IMAGING | Age: 33
End: 2023-09-16
Payer: MEDICAID

## 2023-09-16 ENCOUNTER — HOSPITAL ENCOUNTER (EMERGENCY)
Age: 33
Discharge: HOME | End: 2023-09-16
Attending: STUDENT IN AN ORGANIZED HEALTH CARE EDUCATION/TRAINING PROGRAM
Payer: MEDICAID

## 2023-09-16 ENCOUNTER — APPOINTMENT (OUTPATIENT)
Dept: GENERAL RADIOLOGY | Age: 33
End: 2023-09-16
Payer: MEDICAID

## 2023-09-16 VITALS
TEMPERATURE: 99 F | HEART RATE: 90 BPM | SYSTOLIC BLOOD PRESSURE: 127 MMHG | OXYGEN SATURATION: 99 % | RESPIRATION RATE: 16 BRPM | HEIGHT: 64 IN | WEIGHT: 205 LBS | DIASTOLIC BLOOD PRESSURE: 83 MMHG | BODY MASS INDEX: 35 KG/M2

## 2023-09-16 DIAGNOSIS — T07.XXXA ABRASIONS OF MULTIPLE SITES: ICD-10-CM
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
## 2023-09-16 DIAGNOSIS — S09.90XA CLOSED HEAD INJURY, INITIAL ENCOUNTER: Primary | ICD-10-CM

## 2023-09-16 PROCEDURE — 6360000002 HC RX W HCPCS

## 2023-09-16 PROCEDURE — 96372 THER/PROPH/DIAG INJ SC/IM: CPT

## 2023-09-16 PROCEDURE — 99284 EMERGENCY DEPT VISIT MOD MDM: CPT

## 2023-09-16 PROCEDURE — 73502 X-RAY EXAM HIP UNI 2-3 VIEWS: CPT

## 2023-09-16 PROCEDURE — 6370000000 HC RX 637 (ALT 250 FOR IP)

## 2023-09-16 PROCEDURE — 72125 CT NECK SPINE W/O DYE: CPT

## 2023-09-16 PROCEDURE — 70450 CT HEAD/BRAIN W/O DYE: CPT

## 2023-09-16 RX ORDER — KETOROLAC TROMETHAMINE 30 MG/ML
30 INJECTION, SOLUTION INTRAMUSCULAR; INTRAVENOUS
Status: COMPLETED | OUTPATIENT
Start: 2023-09-16 | End: 2023-09-16

## 2023-09-16 RX ORDER — HYDROCODONE BITARTRATE AND ACETAMINOPHEN 5; 325 MG/1; MG/1
1 TABLET ORAL
Status: COMPLETED | OUTPATIENT
Start: 2023-09-16 | End: 2023-09-16

## 2023-09-16 RX ORDER — ONDANSETRON 4 MG/1
4 TABLET, ORALLY DISINTEGRATING ORAL
Status: COMPLETED | OUTPATIENT
Start: 2023-09-16 | End: 2023-09-16

## 2023-09-16 RX ORDER — HYDROCODONE BITARTRATE AND ACETAMINOPHEN 5; 325 MG/1; MG/1
1 TABLET ORAL EVERY 6 HOURS PRN
Qty: 8 TABLET | Refills: 0 | Status: SHIPPED | OUTPATIENT
Start: 2023-09-16 | End: 2023-09-19

## 2023-09-16 RX ORDER — ONDANSETRON 4 MG/1
4 TABLET, ORALLY DISINTEGRATING ORAL 3 TIMES DAILY PRN
Qty: 20 TABLET | Refills: 0 | Status: SHIPPED | OUTPATIENT
Start: 2023-09-16

## 2023-09-16 RX ADMIN — HYDROCODONE BITARTRATE AND ACETAMINOPHEN 1 TABLET: 5; 325 TABLET ORAL at 17:20

## 2023-09-16 RX ADMIN — KETOROLAC TROMETHAMINE 30 MG: 30 INJECTION, SOLUTION INTRAMUSCULAR; INTRAVENOUS at 18:27

## 2023-09-16 RX ADMIN — ONDANSETRON 4 MG: 4 TABLET, ORALLY DISINTEGRATING ORAL at 17:20

## 2023-09-16 ASSESSMENT — PAIN DESCRIPTION - LOCATION: LOCATION: HEAD;NECK

## 2023-09-16 ASSESSMENT — PAIN DESCRIPTION - DESCRIPTORS: DESCRIPTORS: ACHING

## 2023-09-16 ASSESSMENT — PAIN SCALES - GENERAL
PAINLEVEL_OUTOF10: 8

## 2023-09-16 ASSESSMENT — PAIN - FUNCTIONAL ASSESSMENT: PAIN_FUNCTIONAL_ASSESSMENT: 0-10

## 2023-09-16 ASSESSMENT — ENCOUNTER SYMPTOMS
NAUSEA: 0
ABDOMINAL PAIN: 0
CHEST TIGHTNESS: 0
DIARRHEA: 0
COLOR CHANGE: 0
VOMITING: 0
SHORTNESS OF BREATH: 0

## 2023-09-16 ASSESSMENT — PAIN DESCRIPTION - PAIN TYPE: TYPE: ACUTE PAIN

## 2023-09-16 ASSESSMENT — LIFESTYLE VARIABLES
HOW MANY STANDARD DRINKS CONTAINING ALCOHOL DO YOU HAVE ON A TYPICAL DAY: 1 OR 2
HOW OFTEN DO YOU HAVE A DRINK CONTAINING ALCOHOL: 2-4 TIMES A MONTH

## 2023-09-16 NOTE — ED PROVIDER NOTES
Details   HYDROcodone-acetaminophen (NORCO) 5-325 MG per tablet Take 1 tablet by mouth every 6 hours as needed for Pain for up to 3 days. Intended supply: 3 days. Take lowest dose possible to manage pain Max Daily Amount: 4 tablets, Disp-8 tablet, R-0Print      ondansetron (ZOFRAN-ODT) 4 MG disintegrating tablet Take 1 tablet by mouth 3 times daily as needed for Nausea or Vomiting, Disp-20 tablet, R-0Print              Past Medical History:   Diagnosis Date    Abnormal Papanicolaou smear of cervix     Adenomyosis 2022    Added automatically from request for surgery [id number]    Anemia     oral Fe    Asthma     as a child    COVID-19 vaccine series not completed 2022    Has had COVID 19 infxn -- 2021 Has NOT been COVID 19 vaccinated --counseled and medical websites given for reference    Decreased fetal movement affecting management of pregnancy in third trimester, single or unspecified fetus 2023    Depression     Endometriosis 2017: dx lsc w/ [place] POSTOPERATIVE DIAGNOSES  1. Stage IV endometriosis with involvement of the bladder flap  and adhesions to the anterior lower fundus. 2. Adhesions of the colon to the posterior lower uterine segment. 3. Bilateral adhesions of the ovaries to the pelvic side walls. 4. Endometrioma of the left ovary. Frequent sinus infections     Heart palpitations     Hypertension     hx of; controlled with diet; no meds    Intramural leiomyoma of uterus 2017    LGSIL Pap smear of vagina     Migraine without aura, not intractable 2017    Morbid obesity ([address])     BMI-35    Tachycardia 10/17/2022        Past Surgical History:   Procedure Laterality Date     SECTION  02/2018 x2    elecitve c/s --multiple fetal anomalies, bpd estimated 10.5cm.      SECTION N/A 2023     SECTION performed by Jackson Lee MD at [facility]      GYN  2017    laparoscopy with CO2 laser of
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
## 2023-10-10 RX ORDER — NIFEDIPINE 30 MG/1
30 TABLET, EXTENDED RELEASE ORAL DAILY
Qty: 30 TABLET | Refills: 3 | OUTPATIENT
Start: 2023-10-10

## 2023-12-01 ENCOUNTER — OFFICE VISIT (OUTPATIENT)
Dept: OBGYN CLINIC | Age: 33
End: 2023-12-01
Payer: MEDICAID

## 2023-12-01 VITALS
WEIGHT: 208 LBS | DIASTOLIC BLOOD PRESSURE: 84 MMHG | BODY MASS INDEX: 35.51 KG/M2 | SYSTOLIC BLOOD PRESSURE: 120 MMHG | HEIGHT: 64 IN

## 2023-12-01 DIAGNOSIS — N89.8 VAGINAL DISCHARGE: Primary | ICD-10-CM

## 2023-12-01 PROCEDURE — 99213 OFFICE O/P EST LOW 20 MIN: CPT | Performed by: OBSTETRICS & GYNECOLOGY

## 2023-12-01 RX ORDER — NIFEDIPINE 60 MG/1
60 TABLET, EXTENDED RELEASE ORAL DAILY
COMMUNITY
Start: 2023-11-06

## 2023-12-01 NOTE — PROGRESS NOTES
Chief Complaint   Patient presents with    Vaginal Discharge     Pt c/o vaginal discharge with odor. Reports regular menses  LMP: Patient's last menstrual period was 11/16/2023. Contraception: none    Pt's son passed away at a few months of age, he had been congested and sick and passed in his sleep. Pt understandably has a hard time being away from her other child now after this. She is coping as best as can be expected under such tragic circumstances. She was seeing a therapist and reached a point where she felt she wasn't getting anything out of it. Would like to have more options for this. Allergies   Allergen Reactions    Latex Rash    Hydromorphone Itching     Dilaudid     Current Outpatient Medications   Medication Sig Dispense Refill    metroNIDAZOLE (FLAGYL) 500 MG tablet Take 1 tablet by mouth in the morning and at bedtime 14 tablet 2    NIFEdipine (PROCARDIA XL) 60 MG extended release tablet Take 1 tablet by mouth daily       No current facility-administered medications for this visit. Past Medical History:   Diagnosis Date    Abnormal Papanicolaou smear of cervix     Adenomyosis 06/21/2022    Added automatically from request for surgery [id number]    Anemia     oral Fe    Asthma     as a child    COVID-19 vaccine series not completed 08/12/2022    Has had COVID 19 infxn -- November 2021 Has NOT been COVID 19 vaccinated --counseled and medical websites given for reference    Decreased fetal movement affecting management of pregnancy in third trimester, single or unspecified fetus 1/16/2023    Depression     Endometriosis 05/24/2017 5/24/17: dx lsc w/ [place] POSTOPERATIVE DIAGNOSES  1. Stage IV endometriosis with involvement of the bladder flap  and adhesions to the anterior lower fundus. 2. Adhesions of the colon to the posterior lower uterine segment. 3. Bilateral adhesions of the ovaries to the pelvic side walls. 4. Endometrioma of the left ovary.     Frequent sinus infections

## 2023-12-02 RX ORDER — METRONIDAZOLE 500 MG/1
500 TABLET ORAL 2 TIMES DAILY
Qty: 14 TABLET | Refills: 2 | Status: SHIPPED | OUTPATIENT
Start: 2023-12-02

## 2024-03-25 NOTE — PROGRESS NOTES
Patient's MRI was scheduled on 4/10.    Spiritual care and pre-op prayer given to pt and family. FATOU Lundberg

## 2024-08-06 NOTE — ANESTHESIA POSTPROCEDURE EVALUATION
Procedure(s):   SECTION. Anesthesia Post Evaluation      Multimodal analgesia: multimodal analgesia used between 6 hours prior to anesthesia start to PACU discharge  Patient location during evaluation: PACU  Patient participation: complete - patient participated  Level of consciousness: awake and alert  Pain management: adequate  Airway patency: patent  Anesthetic complications: no  Cardiovascular status: acceptable  Respiratory status: acceptable, spontaneous ventilation and nonlabored ventilation  Hydration status: acceptable  Comments: Moderate pruritis  Post anesthesia nausea and vomiting:  none      Visit Vitals  /56 (BP 1 Location: Left arm, BP Patient Position: At rest)   Pulse 96   Temp 36.9 °C (98.5 °F)   Resp 16   Ht 5' 4\" (1.626 m)   Wt 107.5 kg (237 lb)   SpO2 96%   Breastfeeding?  Yes   BMI 40.68 kg/m² Requesting a call back about purple lumps/bruises all over her body. Also states she is having some pain under her ribs

## 2025-03-10 NOTE — ED PROVIDER NOTES
Chief Complaint:      29 y.o. female at 37w3d  weeks gestation who is seen for mild vaginal leaking of fluid. Unsure whether it is amniotic fluid or urine. Comes in for eval.  Denies bleeding or cramping, reports good fetal movement. Pregnancy complicated by h/o chronic HTN, no meds currently. Previous pregnancy w multiple fetal anomalies, delivered via c/s w subsequent  demise at approx 1 mo age. Scheduled for rpt c/s . HISTORY:    Social History     Substance and Sexual Activity   Sexual Activity Yes    Partners: Male    Birth control/protection: None     Patient's last menstrual period was 2018.     Social History     Socioeconomic History    Marital status: SINGLE     Spouse name: Not on file    Number of children: Not on file    Years of education: Not on file    Highest education level: Not on file   Social Needs    Financial resource strain: Not on file    Food insecurity - worry: Not on file    Food insecurity - inability: Not on file    Transportation needs - medical: Not on file   [employer] needs - non-medical: Not on file   Occupational History    Not on file   Tobacco Use    Smoking status: Never Smoker    Smokeless tobacco: Never Used   Substance and Sexual Activity    Alcohol use: No     Alcohol/week: 0.6 oz     Types: 1 Shots of liquor per week     Comment: Social    Drug use: No    Sexual activity: Yes     Partners: Male     Birth control/protection: None   Other Topics Concern     Service Not Asked    Blood Transfusions Not Asked    Caffeine Concern Not Asked    Occupational Exposure Not Asked    Hobby Hazards Not Asked    Sleep Concern Not Asked    Stress Concern Not Asked    Weight Concern Not Asked    Special Diet Not Asked    Back Care Not Asked    Exercise Not Asked    Bike Helmet Not Asked    Seat Belt Yes    Self-Exams Not Asked   Social History Narrative    Denies physical or sexual abuse       Past Surgical History: Procedure Laterality Date     DELIVERY ONLY      HX  SECTION  2018    elecitve c/s --multiple fetal anomalies, bpd estimated 10.5cm.  HX COLPOSCOPY      HX GYN  2017    laparoscopy with CO2 laser of endometriosis, lysis of adhesions, ovarian cystectomy and chromotubastion     HX WISDOM TEETH EXTRACTION         Past Medical History:   Diagnosis Date    Abnormal Papanicolaou smear of cervix     Anemia     Asthma     as a child    Endometriosis-STAGE 4 2017: dx lsc w/ [place] POSTOPERATIVE DIAGNOSES  1. Stage IV endometriosis with involvement of the bladder flap  and adhesions to the anterior lower fundus. 2. Adhesions of the colon to the posterior lower uterine segment. 3. Bilateral adhesions of the ovaries to the pelvic side walls. 4. Endometrioma of the left ovary.  Family history of endometriosis 2017    Frequent sinus infections     Hypertension     hx of; controlled with diet; no meds    Intramural leiomyoma of uterus 2017    LGSIL Pap smear of vagina     Migraine without aura, not intractable 2017    Morbid obesity (HCC)     BMI-35    Rh negative state in antepartum period          ROS:  A 12 point review of symptoms negative except for chief complaint as described above. PHYSICAL EXAM:  Blood pressure 130/86, pulse 100, temperature 98.5 °F (36.9 °C), resp. rate 18, last menstrual period 2018, unknown if currently breastfeeding. Constitutional: The patient appears well, alert, oriented x 3. Cardiovascular: Heart RRR, no murmurs.    Respiratory: Lungs clear, no respiratory distress  GI: Abdomen soft, nontender, no guarding  No fundal tenderness  Musculoskeletal: no cva tenderness  Upper ext: no edema, reflexes +2  Lower ext: no edema, neg jose m's, reflexes +2  Skin: no rashes or lesions  Psychiatric:Mood/ Affect: appropriate  Genitourinary: SVE: cl/th/high, posterior  FHT:130-140's multiple accels, cat 1  TOCO: no contractions    I personally reviewed pt's medical record including relevant labs and ultrasounds    Amnisure neg    Assessment/Plan:  False labor, no evidence SROM. Chronic HTN w BP within parameters. Reassuring fetal status, stable for home, follow up in office as scheduled. Understands indications for return. no

## 2025-04-24 ENCOUNTER — OFFICE VISIT (OUTPATIENT)
Dept: OBGYN CLINIC | Age: 35
End: 2025-04-24
Payer: COMMERCIAL

## 2025-04-24 VITALS
DIASTOLIC BLOOD PRESSURE: 82 MMHG | BODY MASS INDEX: 37.73 KG/M2 | WEIGHT: 221 LBS | SYSTOLIC BLOOD PRESSURE: 128 MMHG | HEIGHT: 64 IN

## 2025-04-24 DIAGNOSIS — Z11.51 SCREENING FOR HPV (HUMAN PAPILLOMAVIRUS): ICD-10-CM

## 2025-04-24 DIAGNOSIS — Z01.419 WELL WOMAN EXAM WITH ROUTINE GYNECOLOGICAL EXAM: Primary | ICD-10-CM

## 2025-04-24 DIAGNOSIS — Z11.3 SCREENING FOR STD (SEXUALLY TRANSMITTED DISEASE): ICD-10-CM

## 2025-04-24 DIAGNOSIS — Z12.4 SCREENING FOR CERVICAL CANCER: ICD-10-CM

## 2025-04-24 DIAGNOSIS — Z11.8 SCREENING EXAMINATION FOR PARASITIC INFECTION: ICD-10-CM

## 2025-04-24 PROCEDURE — 99395 PREV VISIT EST AGE 18-39: CPT | Performed by: OBSTETRICS & GYNECOLOGY

## 2025-04-24 SDOH — ECONOMIC STABILITY: FOOD INSECURITY: WITHIN THE PAST 12 MONTHS, YOU WORRIED THAT YOUR FOOD WOULD RUN OUT BEFORE YOU GOT MONEY TO BUY MORE.: NEVER TRUE

## 2025-04-24 SDOH — ECONOMIC STABILITY: FOOD INSECURITY: WITHIN THE PAST 12 MONTHS, THE FOOD YOU BOUGHT JUST DIDN'T LAST AND YOU DIDN'T HAVE MONEY TO GET MORE.: NEVER TRUE

## 2025-04-24 ASSESSMENT — PATIENT HEALTH QUESTIONNAIRE - PHQ9
SUM OF ALL RESPONSES TO PHQ QUESTIONS 1-9: 1
SUM OF ALL RESPONSES TO PHQ QUESTIONS 1-9: 1
2. FEELING DOWN, DEPRESSED OR HOPELESS: SEVERAL DAYS
1. LITTLE INTEREST OR PLEASURE IN DOING THINGS: NOT AT ALL
SUM OF ALL RESPONSES TO PHQ QUESTIONS 1-9: 1
SUM OF ALL RESPONSES TO PHQ QUESTIONS 1-9: 1

## 2025-04-24 NOTE — PATIENT INSTRUCTIONS
[place] [employer] [facility]*  (Call [employer]/211 if you need more resources.)    [place] [employer]  They Offer: Housing Choice Vouchers (Section 8) rental assistance available to persons with disabilities, families with children, and older adults whose household income is below 80% the median income for [facility].   Contact: 273.930.6089; Website:www.[employer]    [employer] [place] [employer] Homeless - National Call Center   They offer: Free 24/7 access to trained counselors for local resources and assistance.  Contact: 137.796.4259 Website: www.[place]/nationalcallcenter.asp      [employer]com    https://www.[employer]/ybivydinzg-fatrra-os/    [place] Housing Search    https://www.[employer]/    [facility]:  They Offer: Homelessness Prevention, Affordable Housing, Outreach Support  Contact: 967.499.8268; [address] [place] [address], [place] [address]  Helpful Info: Hours are Monday -Friday 8:30am-4:30pm.    [employer] - [place]   They Offer: Provides shelter to homeless men and families, emergency housing, meals, clothing, personal care items, gospel services, discipleship, personal counseling, drug addiction counseling, GED training. To receive services, families must have marriage license to receive housing.  Contact: [address] [place] [address]; [phone]  Helpful Info:  Hours of Operation: 8:00 a.m. - 9:00 p.m. Monday -Friday      [facility] [facility]   They Offer: Helps with Transitional housing (first week is free, then $7 per day if single; families with children pay $25.00 per week), food, emergency shelter for men and women, helps with prescriptions, but only up to $125 per person per year. To receive services, you need the cut off notice or eviction notice to receive help. Must have proof of address, proof of income, and Social Security card for every individual in the household, photo

## 2025-04-25 LAB
HBV SURFACE AG SER QL: NONREACTIVE
HCV AB SER QL: NONREACTIVE
HIV 1+2 AB+HIV1 P24 AG SERPL QL IA: NONREACTIVE
HIV 1/2 RESULT COMMENT: NORMAL
T PALLIDUM AB SER QL IA: NONREACTIVE

## 2025-04-28 ENCOUNTER — PATIENT MESSAGE (OUTPATIENT)
Dept: OBGYN CLINIC | Age: 35
End: 2025-04-28

## 2025-04-28 RX ORDER — METRONIDAZOLE 500 MG/1
TABLET ORAL
Qty: 14 TABLET | Refills: 2 | OUTPATIENT
Start: 2025-04-28

## 2025-04-29 DIAGNOSIS — N89.8 VAGINAL DISCHARGE: Primary | ICD-10-CM

## 2025-04-29 RX ORDER — METRONIDAZOLE 500 MG/1
500 TABLET ORAL 2 TIMES DAILY
Qty: 14 TABLET | Refills: 2 | Status: SHIPPED | OUTPATIENT
Start: 2025-04-29

## 2025-05-05 LAB
C TRACH RRNA CVX QL NAA+PROBE: NEGATIVE
COLLECTION METHOD: NORMAL
CYTOLOGIST CVX/VAG CYTO: NORMAL
CYTOLOGY CVX/VAG DOC THIN PREP: NORMAL
DATE OF LMP: 0
HPV APTIMA: NEGATIVE
HPV GENOTYPE REFLEX: NORMAL
Lab: NORMAL
Lab: NORMAL
N GONORRHOEA RRNA CVX QL NAA+PROBE: NEGATIVE
PAP SOURCE: NORMAL
PATH REPORT.FINAL DX SPEC: NORMAL
STAT OF ADQ CVX/VAG CYTO-IMP: NORMAL
T VAGINALIS RRNA SPEC QL NAA+PROBE: NEGATIVE

## 2025-05-06 NOTE — PROGRESS NOTES
25:  Patient presents today for   Chief Complaint   Patient presents with    Annual Exam       LMP: Patient's last menstrual period was 2025.  Contraception: none        Allergies   Allergen Reactions    Latex Rash    Hydromorphone Itching     Dilaudid     Current Outpatient Medications   Medication Sig Dispense Refill    NIFEdipine (PROCARDIA XL) 60 MG extended release tablet Take 1 tablet by mouth daily      metroNIDAZOLE (FLAGYL) 500 MG tablet Take 1 tablet by mouth in the morning and at bedtime 14 tablet 2     No current facility-administered medications for this visit.     Past Medical History:   Diagnosis Date    Abnormal Papanicolaou smear of cervix     Adenomyosis 2022    Added automatically from request for surgery [id number]    Anemia     oral Fe    Asthma     as a child    COVID-19 vaccine series not completed 2022    Has had COVID 19 infxn -- 2021 Has NOT been COVID 19 vaccinated --counseled and medical websites given for reference    Decreased fetal movement affecting management of pregnancy in third trimester, single or unspecified fetus 2023    Depression     Endometriosis 2017: dx lsc w/ [place] POSTOPERATIVE DIAGNOSES  1. Stage IV endometriosis with involvement of the bladder flap  and adhesions to the anterior lower fundus.  2. Adhesions of the colon to the posterior lower uterine segment.  3. Bilateral adhesions of the ovaries to the pelvic side walls.   4. Endometrioma of the left ovary.    Frequent sinus infections     Heart palpitations     Hypertension     hx of; controlled with diet; no meds    Intramural leiomyoma of uterus 2017    LGSIL Pap smear of vagina     Migraine without aura, not intractable 2017    Morbid obesity (HCC)     BMI-35    Tachycardia 10/17/2022     Past Surgical History:   Procedure Laterality Date     SECTION  02/2018 x2    elecitve c/s --multiple fetal anomalies, bpd estimated 10.5cm.

## 2025-07-28 ENCOUNTER — OFFICE VISIT (OUTPATIENT)
Dept: OBGYN CLINIC | Age: 35
End: 2025-07-28
Payer: COMMERCIAL

## 2025-07-28 VITALS
BODY MASS INDEX: 37.05 KG/M2 | DIASTOLIC BLOOD PRESSURE: 72 MMHG | WEIGHT: 217 LBS | SYSTOLIC BLOOD PRESSURE: 110 MMHG | HEIGHT: 64 IN

## 2025-07-28 DIAGNOSIS — Z11.8 SCREENING EXAMINATION FOR PARASITIC INFECTION: ICD-10-CM

## 2025-07-28 DIAGNOSIS — N89.8 VAGINAL ODOR: Primary | ICD-10-CM

## 2025-07-28 DIAGNOSIS — N89.8 VAGINAL DISCHARGE: ICD-10-CM

## 2025-07-28 DIAGNOSIS — Z11.3 SCREENING FOR STDS (SEXUALLY TRANSMITTED DISEASES): ICD-10-CM

## 2025-07-28 PROCEDURE — 99213 OFFICE O/P EST LOW 20 MIN: CPT | Performed by: NURSE PRACTITIONER

## 2025-07-28 PROCEDURE — 99459 PELVIC EXAMINATION: CPT | Performed by: NURSE PRACTITIONER

## 2025-07-28 NOTE — PROGRESS NOTES
CC:   Chief Complaint   Patient presents with    Vaginal Discharge    Vaginal Odor         HPI:    Ani  is a 35 y.o. , , Patient's last menstrual period was 2025 (approximate).,  who is seen today due to vaginal discharge and odor. The patient states started experiencing above symptoms last month. She describes vaginal discharge as a \"brown/gray color\" and odor as \"musty\" type odor. She reports experiencing vaginal itching 3 weeks ago for which she did use otc Monistat which resolved symptoms. She denies fevers, chills, vaginal itching, urinary frequency, urgency, or dysuria today.     She denies recent changes to soaps or laundry detergents.      Contraception: None      Menses:  Q  30 Days x 3.5 days, Moderate Flow, Denies intermenstrual VB/spotting, Severe dysmenorrhea (experiences on day 1-takes otc Ibuprofen 800mg)    Not currently sexually active. Last encounter last year with Female Partner. Would like STD testing on [place] today, declines serology.       GYN HISTORY:  As per HPI     Hx STDs: Denies     Last Pap: 2025-Neg cytology  Hx abnormal paps: Denies       Current Outpatient Medications on File Prior to Visit   Medication Sig Dispense Refill    NONFORMULARY       NIFEdipine (PROCARDIA XL) 60 MG extended release tablet Take 1 tablet by mouth daily       No current facility-administered medications on file prior to visit.         Past Medical History:   Diagnosis Date    Abnormal Papanicolaou smear of cervix     Adenomyosis 2022    Added automatically from request for surgery [id number]    Anemia     oral Fe    Asthma     as a child    COVID-19 vaccine series not completed 2022    Has had COVID 19 infxn -- 2021 Has NOT been COVID 19 vaccinated --counseled and medical websites given for reference    Decreased fetal movement affecting management of pregnancy in third trimester, single or unspecified fetus 2023    Depression     Endometriosis 2017:

## 2025-07-30 LAB
A VAGINAE DNA VAG QL NAA+PROBE: NORMAL SCORE
BVAB2 DNA VAG QL NAA+PROBE: NORMAL SCORE
C ALBICANS DNA VAG QL NAA+PROBE: NEGATIVE
C GLABRATA DNA VAG QL NAA+PROBE: NEGATIVE
C TRACH RRNA SPEC QL NAA+PROBE: NEGATIVE
MEGA1 DNA VAG QL NAA+PROBE: NORMAL SCORE
N GONORRHOEA RRNA SPEC QL NAA+PROBE: NEGATIVE
SPECIMEN SOURCE: NORMAL
T VAGINALIS RRNA SPEC QL NAA+PROBE: NEGATIVE

## (undated) DEVICE — Device: Brand: PORTEX

## (undated) DEVICE — 2, DISPOSABLE SUCTION/IRRIGATOR WITHOUT DISPOSABLE TIP: Brand: STRYKEFLOW

## (undated) DEVICE — JELLY LUBRICATING 10GM PREFIL SYR LUBE

## (undated) DEVICE — KENDALL SCD EXPRESS SLEEVES, KNEE LENGTH, MEDIUM: Brand: KENDALL SCD

## (undated) DEVICE — SUTURE VCRL SZ 1 L36IN ABSRB UD L36MM CTB-1 1/2 CIR BLNT JB947

## (undated) DEVICE — STERILE POLYISOPRENE POWDER-FREE SURGICAL GLOVES: Brand: PROTEXIS

## (undated) DEVICE — AMD ANTIMICROBIAL GAUZE SPONGES,12 PLY USP TYPE VII, 0.2% POLYHEXAMETHYLENE BIGUANIDE HCI (PHMB): Brand: CURITY

## (undated) DEVICE — (D)PREP SKN CHLRAPRP APPL 26ML -- CONVERT TO ITEM 371833

## (undated) DEVICE — SUTURE PLN GUT SZ 2-0 L27IN ABSRB YELLOWISH TAN L40MM CT 853H

## (undated) DEVICE — PENCIL ES L3M BTTN SWCH S STL HEX LOK BLDE ELECTRD HOLSTER

## (undated) DEVICE — SOLUTION IV 1000ML 0.9% SOD CHL

## (undated) DEVICE — Device

## (undated) DEVICE — CUTTER ENDOSCP L340MM LIN ARTC SGL STROKE FIRING ENDOPATH

## (undated) DEVICE — DERMABOND SKIN ADH 0.7ML -- DERMABOND ADVANCED 12/BX

## (undated) DEVICE — SOLUTION IRRIG 1000ML H2O STRL BLT

## (undated) DEVICE — (D)SYR 10ML 1/5ML GRAD NSAF -- PKGING CHANGE USE ITEM 338027

## (undated) DEVICE — REM POLYHESIVE ADULT PATIENT RETURN ELECTRODE: Brand: VALLEYLAB

## (undated) DEVICE — SUTURE VCRL SZ 4-0 L27IN ABSRB UD L19MM FS-2 3/8 CIR REV J422H

## (undated) DEVICE — TRAY PREP DRY W/ PREM GLV 2 APPL 6 SPNG 2 UNDPD 1 OVERWRAP

## (undated) DEVICE — ELECTRODE PT RET AD L9FT HI MOIST COND ADH HYDRGEL CORDED

## (undated) DEVICE — SUTURE VCRL SZ 0 L36IN ABSRB UD L36MM CT-1 1/2 CIR J946H

## (undated) DEVICE — SUTURE SZ 0 27IN 5/8 CIR UR-6  TAPER PT VIOLET ABSRB VICRYL J603H

## (undated) DEVICE — APPLICATOR BNDG 1MM ADH PREMIERPRO EXOFIN

## (undated) DEVICE — CYSTO/BLADDER IRRIGATION SET, REGULATING CLAMP

## (undated) DEVICE — SYR 50ML LR LCK 1ML GRAD NSAF --

## (undated) DEVICE — SUTURE VCRL SZ 2-0 L27IN ABSRB VLT L26MM UR-6 5/8 CIR J602H

## (undated) DEVICE — INSUFFLATION NEEDLE: Brand: SURGINEEDLE

## (undated) DEVICE — CATH FOL TY IC BAG 16FR 2000ML -- CONVERT TO ITEM 363158

## (undated) DEVICE — LOGICUT SCISSOR LENGTH 320MM: Brand: LOGI - LAPAROSCOPIC INSTRUMENT SYSTEM

## (undated) DEVICE — TRAY CATH 16F DRN BG LTX -- CONVERT TO ITEM 363158

## (undated) DEVICE — SUTURE MCRYL SZ 4-0 L27IN ABSRB UD L19MM PS-2 1/2 CIR PRIM Y426H

## (undated) DEVICE — SOL ANTI-FOG 6ML MEDC -- MEDICHOICE - CONVERT TO 358427

## (undated) DEVICE — NEEDLE HYPO 18GA L1.5IN PNK S STL HUB POLYPR SHLD REG BVL

## (undated) DEVICE — SURGICAL PROCEDURE PACK C SECT CDS

## (undated) DEVICE — SUT CHRMC 1 27IN CT1 BRN --

## (undated) DEVICE — KIWI® VACUUM DELIVERY SYSTEM, OMNICUP®: Brand: KIWI® OMNICUP®

## (undated) DEVICE — SOLUTION LACTATED RINGERS INJECTION USP

## (undated) DEVICE — TUBING, SUCTION, 1/4" X 10', STRAIGHT: Brand: MEDLINE

## (undated) DEVICE — SUT CHRMC 0 27IN CT1 BRN --

## (undated) DEVICE — SUTURE VCRL SZ 0 L36IN ABSRB UD CT-1 L36MM 1/2 CIR TAPR PNT VCP946H

## (undated) DEVICE — SUTURE VCRL SZ 2-0 L36IN ABSRB VLT L36MM CT-1 1/2 CIR J345H

## (undated) DEVICE — AMD ANTIMICROBIAL NON-ADHERENT PAD,0.2% POLYHEXAMETHYLENE BIGUANIDE HCI (PHMB): Brand: TELFA

## (undated) DEVICE — SUTURE MCRYL SZ 3-0 L27IN ABSRB UD L60MM KS STR REV CUT Y523H

## (undated) DEVICE — BLADELESS OPTICAL TROCAR WITH FIXATION CANNULA: Brand: VERSAONE

## (undated) DEVICE — SUT CHRMC 1 36IN CTX BRN --

## (undated) DEVICE — V2 BLADED TROCAR WITH FIXATION CANNULA: Brand: VERSAPORT

## (undated) DEVICE — BLADELESS OPTICAL TROCAR WITH FIXATION CANNULA: Brand: VERSAPORT

## (undated) DEVICE — CONTAINER SPEC FRMLN 120ML --

## (undated) DEVICE — SUTURE COAT VCRL SZ 4-0 L18IN ABSRB UD L19MM PS-2 1/2 CIR J496G

## (undated) DEVICE — AGENT HEMSTAT 5GM ARISTA AH

## (undated) DEVICE — CARDINAL HEALTH FLEXIBLE LIGHT HANDLE COVER: Brand: CARDINAL HEALTH

## (undated) DEVICE — Z DUPLICATE USE 2847003 INJECTOR UTER

## (undated) DEVICE — SPONGE LAP 18X18IN STRL -- 5/PK

## (undated) DEVICE — ADEPT (4% ICODEXTRIN SOLUTION) IS A SINGLE USE, STERILE, CLEAR, COLORLESS TO PALE YELLOWFLUID FOR INTRAPERITONEAL ADMINISTRATION, CONTAINING ICODEXTRIN AT A CONCENTRATION OF 4%W/V IN AN ELECTROLYTE SOLUTION, PRESENTED IN A FLEXIBLE POLYVINYLCHLORIDE BAG.ADEPT IS INDICATED FOR USE INTRAPERITONEALLY AS AN ADJUNCT TO GOOD SURGICAL TECHNIQUE FORTHE REDUCTION OF POST-SURGICAL ADHESIONS IN PATIENTS UNDERGOING GYNECOLOGICALLAPAROSCOPIC ADHESIOLYSIS.: Brand: ADEPT ADHESION REDUCTION SOLUTION

## (undated) DEVICE — UNIVERSAL FIXATION CANNULA: Brand: VERSAONE

## (undated) DEVICE — TRAY CATH 16FR PVC INTMIT STR TIP PREATTACH TO 1000ML COLL

## (undated) DEVICE — DRAPE TWL SURG 16X26IN BLU ORB04] ALLCARE INC]

## (undated) DEVICE — AGENT HEMSTAT 3GM OXIDIZED REGENERATED CELOS ABSRB FOR CONT (ORDER MULTIPLES OF 5EA)

## (undated) DEVICE — MEDI-VAC NON-CONDUCTIVE SUCTION TUBING: Brand: CARDINAL HEALTH

## (undated) DEVICE — [HIGH FLOW INSUFFLATOR,  DO NOT USE IF PACKAGE IS DAMAGED,  KEEP DRY,  KEEP AWAY FROM SUNLIGHT,  PROTECT FROM HEAT AND RADIOACTIVE SOURCES.]: Brand: PNEUMOSURE